# Patient Record
Sex: MALE | URBAN - METROPOLITAN AREA
[De-identification: names, ages, dates, MRNs, and addresses within clinical notes are randomized per-mention and may not be internally consistent; named-entity substitution may affect disease eponyms.]

---

## 2022-01-01 ENCOUNTER — HOSPITAL ENCOUNTER (INPATIENT)
Facility: HOSPITAL | Age: 67
LOS: 8 days | DRG: 064 | End: 2022-11-22
Attending: PSYCHIATRY & NEUROLOGY | Admitting: PSYCHIATRY & NEUROLOGY
Payer: COMMERCIAL

## 2022-01-01 ENCOUNTER — DOCUMENTATION ONLY (OUTPATIENT)
Dept: ELECTROPHYSIOLOGY | Facility: CLINIC | Age: 67
End: 2022-01-01

## 2022-01-01 ENCOUNTER — DOCUMENTATION ONLY (OUTPATIENT)
Dept: ELECTROPHYSIOLOGY | Facility: CLINIC | Age: 67
End: 2022-01-01
Payer: COMMERCIAL

## 2022-01-01 ENCOUNTER — DOCUMENTATION ONLY (OUTPATIENT)
Dept: NEUROLOGY | Facility: CLINIC | Age: 67
End: 2022-01-01
Payer: COMMERCIAL

## 2022-01-01 VITALS
DIASTOLIC BLOOD PRESSURE: 52 MMHG | OXYGEN SATURATION: 98 % | HEART RATE: 116 BPM | WEIGHT: 220.44 LBS | HEIGHT: 72 IN | BODY MASS INDEX: 29.86 KG/M2 | RESPIRATION RATE: 20 BRPM | TEMPERATURE: 99 F | SYSTOLIC BLOOD PRESSURE: 80 MMHG

## 2022-01-01 DIAGNOSIS — I50.9 CONGESTIVE HEART FAILURE, UNSPECIFIED HF CHRONICITY, UNSPECIFIED HEART FAILURE TYPE: Primary | ICD-10-CM

## 2022-01-01 DIAGNOSIS — I48.11 LONGSTANDING PERSISTENT ATRIAL FIBRILLATION: ICD-10-CM

## 2022-01-01 DIAGNOSIS — N17.9 ACUTE KIDNEY INJURY SUPERIMPOSED ON CHRONIC KIDNEY DISEASE: ICD-10-CM

## 2022-01-01 DIAGNOSIS — I61.9 ICH (INTRACEREBRAL HEMORRHAGE): ICD-10-CM

## 2022-01-01 DIAGNOSIS — D68.9: ICD-10-CM

## 2022-01-01 DIAGNOSIS — E87.3 METABOLIC ALKALOSIS: ICD-10-CM

## 2022-01-01 DIAGNOSIS — I50.9 CHF (CONGESTIVE HEART FAILURE): ICD-10-CM

## 2022-01-01 DIAGNOSIS — N18.9 ACUTE KIDNEY INJURY SUPERIMPOSED ON CHRONIC KIDNEY DISEASE: ICD-10-CM

## 2022-01-01 DIAGNOSIS — E87.6 HYPOKALEMIA: ICD-10-CM

## 2022-01-01 DIAGNOSIS — Z99.11 ON MECHANICALLY ASSISTED VENTILATION: ICD-10-CM

## 2022-01-01 DIAGNOSIS — I60.9 SAH (SUBARACHNOID HEMORRHAGE): ICD-10-CM

## 2022-01-01 DIAGNOSIS — R78.81 MSSA BACTEREMIA: ICD-10-CM

## 2022-01-01 DIAGNOSIS — R77.0 LOW SERUM ALBUMIN: ICD-10-CM

## 2022-01-01 DIAGNOSIS — Z71.89 ADVANCE CARE PLANNING: ICD-10-CM

## 2022-01-01 DIAGNOSIS — R57.9 SHOCK: ICD-10-CM

## 2022-01-01 DIAGNOSIS — R00.0 TACHYCARDIA: ICD-10-CM

## 2022-01-01 DIAGNOSIS — Z51.5 PALLIATIVE CARE ENCOUNTER: ICD-10-CM

## 2022-01-01 DIAGNOSIS — I60.9: ICD-10-CM

## 2022-01-01 DIAGNOSIS — R56.9 SEIZURE: ICD-10-CM

## 2022-01-01 DIAGNOSIS — I60.9 SUBARACHNOID BLEED: ICD-10-CM

## 2022-01-01 DIAGNOSIS — B95.61 MSSA BACTEREMIA: ICD-10-CM

## 2022-01-01 DIAGNOSIS — G93.40 ACUTE ENCEPHALOPATHY: ICD-10-CM

## 2022-01-01 DIAGNOSIS — I50.43 ACUTE ON CHRONIC COMBINED SYSTOLIC AND DIASTOLIC CONGESTIVE HEART FAILURE: ICD-10-CM

## 2022-01-01 DIAGNOSIS — E87.0 HYPERNATREMIA: ICD-10-CM

## 2022-01-01 LAB
ABO + RH BLD: NORMAL
ALBUMIN SERPL BCP-MCNC: 1.2 G/DL (ref 3.5–5.2)
ALBUMIN SERPL BCP-MCNC: 1.3 G/DL (ref 3.5–5.2)
ALBUMIN SERPL BCP-MCNC: 1.4 G/DL (ref 3.5–5.2)
ALBUMIN SERPL BCP-MCNC: 1.5 G/DL (ref 3.5–5.2)
ALBUMIN SERPL BCP-MCNC: 1.7 G/DL (ref 3.5–5.2)
ALBUMIN SERPL BCP-MCNC: 2.1 G/DL (ref 3.5–5.2)
ALBUMIN SERPL BCP-MCNC: 2.1 G/DL (ref 3.5–5.2)
ALLENS TEST: ABNORMAL
ALP SERPL-CCNC: 117 U/L (ref 55–135)
ALP SERPL-CCNC: 117 U/L (ref 55–135)
ALP SERPL-CCNC: 132 U/L (ref 55–135)
ALP SERPL-CCNC: 83 U/L (ref 55–135)
ALP SERPL-CCNC: 88 U/L (ref 55–135)
ALP SERPL-CCNC: 94 U/L (ref 55–135)
ALP SERPL-CCNC: 96 U/L (ref 55–135)
ALT SERPL W/O P-5'-P-CCNC: 25 U/L (ref 10–44)
ALT SERPL W/O P-5'-P-CCNC: 26 U/L (ref 10–44)
ALT SERPL W/O P-5'-P-CCNC: 30 U/L (ref 10–44)
ALT SERPL W/O P-5'-P-CCNC: 36 U/L (ref 10–44)
ALT SERPL W/O P-5'-P-CCNC: 66 U/L (ref 10–44)
ALT SERPL W/O P-5'-P-CCNC: 68 U/L (ref 10–44)
ALT SERPL W/O P-5'-P-CCNC: 69 U/L (ref 10–44)
ANION GAP SERPL CALC-SCNC: 10 MMOL/L (ref 8–16)
ANION GAP SERPL CALC-SCNC: 11 MMOL/L (ref 8–16)
ANION GAP SERPL CALC-SCNC: 12 MMOL/L (ref 8–16)
ANION GAP SERPL CALC-SCNC: 13 MMOL/L (ref 8–16)
ANION GAP SERPL CALC-SCNC: 14 MMOL/L (ref 8–16)
ANION GAP SERPL CALC-SCNC: 15 MMOL/L (ref 8–16)
ANION GAP SERPL CALC-SCNC: 16 MMOL/L (ref 8–16)
ANION GAP SERPL CALC-SCNC: 6 MMOL/L (ref 8–16)
ANISOCYTOSIS BLD QL SMEAR: SLIGHT
APTT BLDCRRT: 26.1 SEC (ref 21–32)
APTT BLDCRRT: 26.9 SEC (ref 21–32)
APTT BLDCRRT: 28.7 SEC (ref 21–32)
APTT BLDCRRT: 31 SEC (ref 21–32)
APTT BLDCRRT: 31.2 SEC (ref 21–32)
APTT BLDCRRT: 31.4 SEC (ref 21–32)
ASCENDING AORTA: 3.27 CM
AST SERPL-CCNC: 113 U/L (ref 10–40)
AST SERPL-CCNC: 130 U/L (ref 10–40)
AST SERPL-CCNC: 147 U/L (ref 10–40)
AST SERPL-CCNC: 68 U/L (ref 10–40)
AST SERPL-CCNC: 72 U/L (ref 10–40)
AST SERPL-CCNC: 74 U/L (ref 10–40)
AST SERPL-CCNC: 82 U/L (ref 10–40)
AV INDEX (PROSTH): 0.49
AV MEAN GRADIENT: 4 MMHG
AV PEAK GRADIENT: 5 MMHG
AV VALVE AREA: 1.73 CM2
AV VELOCITY RATIO: 0.49
BACTERIA #/AREA URNS AUTO: ABNORMAL /HPF
BACTERIA BLD CULT: NORMAL
BACTERIA BLD CULT: NORMAL
BACTERIA SPEC AEROBE CULT: ABNORMAL
BACTERIA SPEC AEROBE CULT: ABNORMAL
BASOPHILS # BLD AUTO: 0.03 K/UL (ref 0–0.2)
BASOPHILS # BLD AUTO: 0.04 K/UL (ref 0–0.2)
BASOPHILS # BLD AUTO: 0.05 K/UL (ref 0–0.2)
BASOPHILS # BLD AUTO: 0.05 K/UL (ref 0–0.2)
BASOPHILS # BLD AUTO: ABNORMAL K/UL (ref 0–0.2)
BASOPHILS NFR BLD: 0 % (ref 0–1.9)
BASOPHILS NFR BLD: 0.2 % (ref 0–1.9)
BASOPHILS NFR BLD: 0.3 % (ref 0–1.9)
BASOPHILS NFR BLD: 0.3 % (ref 0–1.9)
BASOPHILS NFR BLD: 0.5 % (ref 0–1.9)
BILIRUB SERPL-MCNC: 1.2 MG/DL (ref 0.1–1)
BILIRUB SERPL-MCNC: 1.5 MG/DL (ref 0.1–1)
BILIRUB SERPL-MCNC: 1.7 MG/DL (ref 0.1–1)
BILIRUB SERPL-MCNC: 1.9 MG/DL (ref 0.1–1)
BILIRUB SERPL-MCNC: 2.4 MG/DL (ref 0.1–1)
BILIRUB SERPL-MCNC: 2.6 MG/DL (ref 0.1–1)
BILIRUB SERPL-MCNC: 3.3 MG/DL (ref 0.1–1)
BILIRUB UR QL STRIP: NEGATIVE
BLD GP AB SCN CELLS X3 SERPL QL: NORMAL
BNP SERPL-MCNC: 337 PG/ML (ref 0–99)
BSA FOR ECHO PROCEDURE: 2.25 M2
BUN SERPL-MCNC: 103 MG/DL (ref 8–23)
BUN SERPL-MCNC: 104 MG/DL (ref 8–23)
BUN SERPL-MCNC: 105 MG/DL (ref 8–23)
BUN SERPL-MCNC: 108 MG/DL (ref 8–23)
BUN SERPL-MCNC: 109 MG/DL (ref 8–23)
BUN SERPL-MCNC: 117 MG/DL (ref 8–23)
BUN SERPL-MCNC: 118 MG/DL (ref 8–23)
BUN SERPL-MCNC: 67 MG/DL (ref 8–23)
BUN SERPL-MCNC: 68 MG/DL (ref 8–23)
BUN SERPL-MCNC: 72 MG/DL (ref 8–23)
BUN SERPL-MCNC: 75 MG/DL (ref 8–23)
BUN SERPL-MCNC: 76 MG/DL (ref 8–23)
BUN SERPL-MCNC: 81 MG/DL (ref 8–23)
BUN SERPL-MCNC: 83 MG/DL (ref 8–23)
BUN SERPL-MCNC: 85 MG/DL (ref 8–23)
BUN SERPL-MCNC: 85 MG/DL (ref 8–23)
BUN SERPL-MCNC: 87 MG/DL (ref 8–23)
BUN SERPL-MCNC: 88 MG/DL (ref 8–23)
BUN SERPL-MCNC: 90 MG/DL (ref 8–23)
BUN SERPL-MCNC: 93 MG/DL (ref 8–23)
BUN SERPL-MCNC: 96 MG/DL (ref 8–23)
BUN SERPL-MCNC: 99 MG/DL (ref 8–23)
BUN SERPL-MCNC: 99 MG/DL (ref 8–23)
BURR CELLS BLD QL SMEAR: ABNORMAL
CALCIUM SERPL-MCNC: 8 MG/DL (ref 8.7–10.5)
CALCIUM SERPL-MCNC: 8.1 MG/DL (ref 8.7–10.5)
CALCIUM SERPL-MCNC: 8.2 MG/DL (ref 8.7–10.5)
CALCIUM SERPL-MCNC: 8.2 MG/DL (ref 8.7–10.5)
CALCIUM SERPL-MCNC: 8.3 MG/DL (ref 8.7–10.5)
CALCIUM SERPL-MCNC: 8.4 MG/DL (ref 8.7–10.5)
CALCIUM SERPL-MCNC: 8.5 MG/DL (ref 8.7–10.5)
CALCIUM SERPL-MCNC: 8.6 MG/DL (ref 8.7–10.5)
CALCIUM SERPL-MCNC: 8.7 MG/DL (ref 8.7–10.5)
CALCIUM SERPL-MCNC: 8.7 MG/DL (ref 8.7–10.5)
CALCIUM SERPL-MCNC: 8.8 MG/DL (ref 8.7–10.5)
CALCIUM SERPL-MCNC: 8.8 MG/DL (ref 8.7–10.5)
CALCIUM SERPL-MCNC: 8.9 MG/DL (ref 8.7–10.5)
CALCIUM SERPL-MCNC: 9 MG/DL (ref 8.7–10.5)
CALCIUM SERPL-MCNC: 9 MG/DL (ref 8.7–10.5)
CALCIUM SERPL-MCNC: 9.1 MG/DL (ref 8.7–10.5)
CALCIUM SERPL-MCNC: 9.1 MG/DL (ref 8.7–10.5)
CHLORIDE SERPL-SCNC: 100 MMOL/L (ref 95–110)
CHLORIDE SERPL-SCNC: 101 MMOL/L (ref 95–110)
CHLORIDE SERPL-SCNC: 104 MMOL/L (ref 95–110)
CHLORIDE SERPL-SCNC: 105 MMOL/L (ref 95–110)
CHLORIDE SERPL-SCNC: 106 MMOL/L (ref 95–110)
CHLORIDE SERPL-SCNC: 107 MMOL/L (ref 95–110)
CHLORIDE SERPL-SCNC: 108 MMOL/L (ref 95–110)
CHLORIDE SERPL-SCNC: 109 MMOL/L (ref 95–110)
CHLORIDE SERPL-SCNC: 112 MMOL/L (ref 95–110)
CHLORIDE SERPL-SCNC: 112 MMOL/L (ref 95–110)
CHLORIDE SERPL-SCNC: 113 MMOL/L (ref 95–110)
CHLORIDE SERPL-SCNC: 114 MMOL/L (ref 95–110)
CHLORIDE SERPL-SCNC: 114 MMOL/L (ref 95–110)
CHLORIDE SERPL-SCNC: 90 MMOL/L (ref 95–110)
CHLORIDE SERPL-SCNC: 98 MMOL/L (ref 95–110)
CHLORIDE SERPL-SCNC: 98 MMOL/L (ref 95–110)
CHOLEST SERPL-MCNC: 91 MG/DL (ref 120–199)
CHOLEST/HDLC SERPL: 6.5 {RATIO} (ref 2–5)
CK MB SERPL-MCNC: 1.5 NG/ML (ref 0.1–6.5)
CK MB SERPL-RTO: 0.2 % (ref 0–5)
CK SERPL-CCNC: 718 U/L (ref 20–200)
CLARITY UR REFRACT.AUTO: CLEAR
CO2 SERPL-SCNC: 30 MMOL/L (ref 23–29)
CO2 SERPL-SCNC: 31 MMOL/L (ref 23–29)
CO2 SERPL-SCNC: 32 MMOL/L (ref 23–29)
CO2 SERPL-SCNC: 33 MMOL/L (ref 23–29)
CO2 SERPL-SCNC: 34 MMOL/L (ref 23–29)
CO2 SERPL-SCNC: 34 MMOL/L (ref 23–29)
CO2 SERPL-SCNC: 35 MMOL/L (ref 23–29)
CO2 SERPL-SCNC: 36 MMOL/L (ref 23–29)
CO2 SERPL-SCNC: 38 MMOL/L (ref 23–29)
CO2 SERPL-SCNC: 38 MMOL/L (ref 23–29)
CO2 SERPL-SCNC: 42 MMOL/L (ref 23–29)
COLOR UR AUTO: YELLOW
CREAT SERPL-MCNC: 1.4 MG/DL (ref 0.5–1.4)
CREAT SERPL-MCNC: 1.5 MG/DL (ref 0.5–1.4)
CREAT SERPL-MCNC: 1.6 MG/DL (ref 0.5–1.4)
CREAT SERPL-MCNC: 1.7 MG/DL (ref 0.5–1.4)
CREAT SERPL-MCNC: 1.7 MG/DL (ref 0.5–1.4)
CREAT SERPL-MCNC: 1.8 MG/DL (ref 0.5–1.4)
CREAT SERPL-MCNC: 1.9 MG/DL (ref 0.5–1.4)
CREAT SERPL-MCNC: 2 MG/DL (ref 0.5–1.4)
CREAT SERPL-MCNC: 2 MG/DL (ref 0.5–1.4)
CV ECHO LV RWT: 0.32 CM
DACRYOCYTES BLD QL SMEAR: ABNORMAL
DELSYS: ABNORMAL
DIFFERENTIAL METHOD: ABNORMAL
DOHLE BOD BLD QL SMEAR: PRESENT
DOHLE BOD BLD QL SMEAR: PRESENT
DOP CALC AO PEAK VEL: 1.09 M/S
DOP CALC AO VTI: 15.71 CM
DOP CALC LVOT AREA: 3.6 CM2
DOP CALC LVOT DIAMETER: 2.13 CM
DOP CALC LVOT PEAK VEL: 0.53 M/S
DOP CALC LVOT STROKE VOLUME: 27.21 CM3
DOP CALCLVOT PEAK VEL VTI: 7.64 CM
ECHO LV POSTERIOR WALL: 0.9 CM (ref 0.6–1.1)
EJECTION FRACTION: 10 %
EOSINOPHIL # BLD AUTO: 0 K/UL (ref 0–0.5)
EOSINOPHIL # BLD AUTO: 0 K/UL (ref 0–0.5)
EOSINOPHIL # BLD AUTO: 0.1 K/UL (ref 0–0.5)
EOSINOPHIL # BLD AUTO: ABNORMAL K/UL (ref 0–0.5)
EOSINOPHIL NFR BLD: 0 % (ref 0–8)
EOSINOPHIL NFR BLD: 0.1 % (ref 0–8)
EOSINOPHIL NFR BLD: 0.3 % (ref 0–8)
EOSINOPHIL NFR BLD: 0.3 % (ref 0–8)
EOSINOPHIL NFR BLD: 0.4 % (ref 0–8)
EOSINOPHIL NFR BLD: 0.5 % (ref 0–8)
EOSINOPHIL NFR BLD: 0.9 % (ref 0–8)
ERYTHROCYTE [DISTWIDTH] IN BLOOD BY AUTOMATED COUNT: 19.1 % (ref 11.5–14.5)
ERYTHROCYTE [DISTWIDTH] IN BLOOD BY AUTOMATED COUNT: 19.1 % (ref 11.5–14.5)
ERYTHROCYTE [DISTWIDTH] IN BLOOD BY AUTOMATED COUNT: 19.4 % (ref 11.5–14.5)
ERYTHROCYTE [DISTWIDTH] IN BLOOD BY AUTOMATED COUNT: 19.5 % (ref 11.5–14.5)
ERYTHROCYTE [DISTWIDTH] IN BLOOD BY AUTOMATED COUNT: 19.5 % (ref 11.5–14.5)
ERYTHROCYTE [DISTWIDTH] IN BLOOD BY AUTOMATED COUNT: 19.8 % (ref 11.5–14.5)
ERYTHROCYTE [DISTWIDTH] IN BLOOD BY AUTOMATED COUNT: 19.8 % (ref 11.5–14.5)
ERYTHROCYTE [SEDIMENTATION RATE] IN BLOOD BY WESTERGREN METHOD: 16 MM/H
ERYTHROCYTE [SEDIMENTATION RATE] IN BLOOD BY WESTERGREN METHOD: 16 MM/H
ERYTHROCYTE [SEDIMENTATION RATE] IN BLOOD BY WESTERGREN METHOD: 18 MM/H
ERYTHROCYTE [SEDIMENTATION RATE] IN BLOOD BY WESTERGREN METHOD: 20 MM/H
EST. GFR  (NO RACE VARIABLE): 36.4 ML/MIN/1.73 M^2
EST. GFR  (NO RACE VARIABLE): 36.4 ML/MIN/1.73 M^2
EST. GFR  (NO RACE VARIABLE): 38.7 ML/MIN/1.73 M^2
EST. GFR  (NO RACE VARIABLE): 41.3 ML/MIN/1.73 M^2
EST. GFR  (NO RACE VARIABLE): 44.2 ML/MIN/1.73 M^2
EST. GFR  (NO RACE VARIABLE): 44.2 ML/MIN/1.73 M^2
EST. GFR  (NO RACE VARIABLE): 47.5 ML/MIN/1.73 M^2
EST. GFR  (NO RACE VARIABLE): 51.3 ML/MIN/1.73 M^2
EST. GFR  (NO RACE VARIABLE): 55.8 ML/MIN/1.73 M^2
ESTIMATED AVG GLUCOSE: 148 MG/DL (ref 68–131)
FIO2: 40
FIO2: 50
FIO2: 60
FRACTIONAL SHORTENING: 3 % (ref 28–44)
GIANT PLATELETS BLD QL SMEAR: PRESENT
GLUCOSE SERPL-MCNC: 157 MG/DL (ref 70–110)
GLUCOSE SERPL-MCNC: 175 MG/DL (ref 70–110)
GLUCOSE SERPL-MCNC: 180 MG/DL (ref 70–110)
GLUCOSE SERPL-MCNC: 187 MG/DL (ref 70–110)
GLUCOSE SERPL-MCNC: 188 MG/DL (ref 70–110)
GLUCOSE SERPL-MCNC: 207 MG/DL (ref 70–110)
GLUCOSE SERPL-MCNC: 216 MG/DL (ref 70–110)
GLUCOSE SERPL-MCNC: 220 MG/DL (ref 70–110)
GLUCOSE SERPL-MCNC: 228 MG/DL (ref 70–110)
GLUCOSE SERPL-MCNC: 232 MG/DL (ref 70–110)
GLUCOSE SERPL-MCNC: 232 MG/DL (ref 70–110)
GLUCOSE SERPL-MCNC: 233 MG/DL (ref 70–110)
GLUCOSE SERPL-MCNC: 240 MG/DL (ref 70–110)
GLUCOSE SERPL-MCNC: 241 MG/DL (ref 70–110)
GLUCOSE SERPL-MCNC: 247 MG/DL (ref 70–110)
GLUCOSE SERPL-MCNC: 259 MG/DL (ref 70–110)
GLUCOSE SERPL-MCNC: 259 MG/DL (ref 70–110)
GLUCOSE SERPL-MCNC: 265 MG/DL (ref 70–110)
GLUCOSE SERPL-MCNC: 281 MG/DL (ref 70–110)
GLUCOSE SERPL-MCNC: 293 MG/DL (ref 70–110)
GLUCOSE SERPL-MCNC: 328 MG/DL (ref 70–110)
GLUCOSE SERPL-MCNC: 334 MG/DL (ref 70–110)
GLUCOSE SERPL-MCNC: 344 MG/DL (ref 70–110)
GLUCOSE UR QL STRIP: ABNORMAL
GRAM STN SPEC: ABNORMAL
HBA1C MFR BLD: 6.8 % (ref 4–5.6)
HCO3 UR-SCNC: 32.3 MMOL/L (ref 24–28)
HCO3 UR-SCNC: 34.4 MMOL/L (ref 24–28)
HCO3 UR-SCNC: 35.4 MMOL/L (ref 24–28)
HCO3 UR-SCNC: 36.3 MMOL/L (ref 24–28)
HCO3 UR-SCNC: 36.9 MMOL/L (ref 24–28)
HCO3 UR-SCNC: 37.4 MMOL/L (ref 24–28)
HCO3 UR-SCNC: 41.8 MMOL/L (ref 24–28)
HCO3 UR-SCNC: 46.4 MMOL/L (ref 24–28)
HCO3 UR-SCNC: 46.5 MMOL/L (ref 24–28)
HCT VFR BLD AUTO: 42.1 % (ref 40–54)
HCT VFR BLD AUTO: 42.4 % (ref 40–54)
HCT VFR BLD AUTO: 44.6 % (ref 40–54)
HCT VFR BLD AUTO: 44.9 % (ref 40–54)
HCT VFR BLD AUTO: 45.5 % (ref 40–54)
HCT VFR BLD AUTO: 46.5 % (ref 40–54)
HCT VFR BLD AUTO: 46.5 % (ref 40–54)
HDLC SERPL-MCNC: 14 MG/DL (ref 40–75)
HDLC SERPL: 15.4 % (ref 20–50)
HGB BLD-MCNC: 12.4 G/DL (ref 14–18)
HGB BLD-MCNC: 12.5 G/DL (ref 14–18)
HGB BLD-MCNC: 12.9 G/DL (ref 14–18)
HGB BLD-MCNC: 13.2 G/DL (ref 14–18)
HGB BLD-MCNC: 13.3 G/DL (ref 14–18)
HGB BLD-MCNC: 13.7 G/DL (ref 14–18)
HGB BLD-MCNC: 13.9 G/DL (ref 14–18)
HGB UR QL STRIP: ABNORMAL
HYALINE CASTS UR QL AUTO: 1 /LPF
HYPOCHROMIA BLD QL SMEAR: ABNORMAL
HYPOCHROMIA BLD QL SMEAR: ABNORMAL
IMM GRANULOCYTES # BLD AUTO: 0.05 K/UL (ref 0–0.04)
IMM GRANULOCYTES # BLD AUTO: 0.05 K/UL (ref 0–0.04)
IMM GRANULOCYTES # BLD AUTO: 0.16 K/UL (ref 0–0.04)
IMM GRANULOCYTES # BLD AUTO: 0.18 K/UL (ref 0–0.04)
IMM GRANULOCYTES # BLD AUTO: 0.19 K/UL (ref 0–0.04)
IMM GRANULOCYTES # BLD AUTO: 0.96 K/UL (ref 0–0.04)
IMM GRANULOCYTES # BLD AUTO: ABNORMAL K/UL (ref 0–0.04)
IMM GRANULOCYTES NFR BLD AUTO: 0.4 % (ref 0–0.5)
IMM GRANULOCYTES NFR BLD AUTO: 0.5 % (ref 0–0.5)
IMM GRANULOCYTES NFR BLD AUTO: 1.2 % (ref 0–0.5)
IMM GRANULOCYTES NFR BLD AUTO: 1.2 % (ref 0–0.5)
IMM GRANULOCYTES NFR BLD AUTO: 1.6 % (ref 0–0.5)
IMM GRANULOCYTES NFR BLD AUTO: 4.7 % (ref 0–0.5)
IMM GRANULOCYTES NFR BLD AUTO: ABNORMAL % (ref 0–0.5)
INR PPP: 1.2 (ref 0.8–1.2)
INR PPP: 1.3 (ref 0.8–1.2)
INR PPP: 1.4 (ref 0.8–1.2)
INR PPP: 1.6 (ref 0.8–1.2)
INTERVENTRICULAR SEPTUM: 1 CM (ref 0.6–1.1)
KETONES UR QL STRIP: NEGATIVE
LA MAJOR: 7.01 CM
LA MINOR: 6.52 CM
LA WIDTH: 4.35 CM
LACTATE SERPL-SCNC: 1.8 MMOL/L (ref 0.5–2.2)
LACTATE SERPL-SCNC: 2.5 MMOL/L (ref 0.5–2.2)
LDLC SERPL CALC-MCNC: 36.8 MG/DL (ref 63–159)
LEFT ATRIUM SIZE: 5.02 CM
LEFT ATRIUM VOLUME INDEX: 56.5 ML/M2
LEFT ATRIUM VOLUME: 125.4 CM3
LEFT INTERNAL DIMENSION IN SYSTOLE: 5.55 CM (ref 2.1–4)
LEFT VENTRICLE DIASTOLIC VOLUME INDEX: 83.15 ML/M2
LEFT VENTRICLE DIASTOLIC VOLUME: 184.59 ML
LEFT VENTRICLE MASS INDEX: 95 G/M2
LEFT VENTRICLE SYSTOLIC VOLUME INDEX: 67.8 ML/M2
LEFT VENTRICLE SYSTOLIC VOLUME: 150.55 ML
LEFT VENTRICULAR INTERNAL DIMENSION IN DIASTOLE: 5.7 CM (ref 3.5–6)
LEFT VENTRICULAR MASS: 211.75 G
LEUKOCYTE ESTERASE UR QL STRIP: ABNORMAL
LYMPHOCYTES # BLD AUTO: 0.4 K/UL (ref 1–4.8)
LYMPHOCYTES # BLD AUTO: 0.4 K/UL (ref 1–4.8)
LYMPHOCYTES # BLD AUTO: 0.5 K/UL (ref 1–4.8)
LYMPHOCYTES # BLD AUTO: 0.6 K/UL (ref 1–4.8)
LYMPHOCYTES # BLD AUTO: ABNORMAL K/UL (ref 1–4.8)
LYMPHOCYTES NFR BLD: 2 % (ref 18–48)
LYMPHOCYTES NFR BLD: 3.1 % (ref 18–48)
LYMPHOCYTES NFR BLD: 3.6 % (ref 18–48)
LYMPHOCYTES NFR BLD: 4 % (ref 18–48)
LYMPHOCYTES NFR BLD: 4.5 % (ref 18–48)
LYMPHOCYTES NFR BLD: 4.6 % (ref 18–48)
LYMPHOCYTES NFR BLD: 4.7 % (ref 18–48)
MAGNESIUM SERPL-MCNC: 2.1 MG/DL (ref 1.6–2.6)
MAGNESIUM SERPL-MCNC: 2.2 MG/DL (ref 1.6–2.6)
MAGNESIUM SERPL-MCNC: 2.2 MG/DL (ref 1.6–2.6)
MAGNESIUM SERPL-MCNC: 2.3 MG/DL (ref 1.6–2.6)
MAGNESIUM SERPL-MCNC: 2.4 MG/DL (ref 1.6–2.6)
MAGNESIUM SERPL-MCNC: 2.4 MG/DL (ref 1.6–2.6)
MAGNESIUM SERPL-MCNC: 2.5 MG/DL (ref 1.6–2.6)
MAGNESIUM SERPL-MCNC: 2.5 MG/DL (ref 1.6–2.6)
MAGNESIUM SERPL-MCNC: 2.6 MG/DL (ref 1.6–2.6)
MCH RBC QN AUTO: 30 PG (ref 27–31)
MCH RBC QN AUTO: 30.4 PG (ref 27–31)
MCH RBC QN AUTO: 30.6 PG (ref 27–31)
MCH RBC QN AUTO: 30.6 PG (ref 27–31)
MCH RBC QN AUTO: 30.8 PG (ref 27–31)
MCH RBC QN AUTO: 30.9 PG (ref 27–31)
MCH RBC QN AUTO: 31.4 PG (ref 27–31)
MCHC RBC AUTO-ENTMCNC: 28.6 G/DL (ref 32–36)
MCHC RBC AUTO-ENTMCNC: 28.7 G/DL (ref 32–36)
MCHC RBC AUTO-ENTMCNC: 29.5 G/DL (ref 32–36)
MCHC RBC AUTO-ENTMCNC: 29.5 G/DL (ref 32–36)
MCHC RBC AUTO-ENTMCNC: 29.6 G/DL (ref 32–36)
MCHC RBC AUTO-ENTMCNC: 29.9 G/DL (ref 32–36)
MCHC RBC AUTO-ENTMCNC: 30.1 G/DL (ref 32–36)
MCV RBC AUTO: 100 FL (ref 82–98)
MCV RBC AUTO: 104 FL (ref 82–98)
MCV RBC AUTO: 105 FL (ref 82–98)
MCV RBC AUTO: 106 FL (ref 82–98)
MCV RBC AUTO: 107 FL (ref 82–98)
MICROSCOPIC COMMENT: ABNORMAL
MODE: ABNORMAL
MONOCYTES # BLD AUTO: 0.4 K/UL (ref 0.3–1)
MONOCYTES # BLD AUTO: 0.5 K/UL (ref 0.3–1)
MONOCYTES # BLD AUTO: 0.5 K/UL (ref 0.3–1)
MONOCYTES # BLD AUTO: 0.6 K/UL (ref 0.3–1)
MONOCYTES # BLD AUTO: 0.7 K/UL (ref 0.3–1)
MONOCYTES # BLD AUTO: 0.9 K/UL (ref 0.3–1)
MONOCYTES # BLD AUTO: ABNORMAL K/UL (ref 0.3–1)
MONOCYTES NFR BLD: 3.4 % (ref 4–15)
MONOCYTES NFR BLD: 3.6 % (ref 4–15)
MONOCYTES NFR BLD: 3.9 % (ref 4–15)
MONOCYTES NFR BLD: 4 % (ref 4–15)
MONOCYTES NFR BLD: 4.5 % (ref 4–15)
MONOCYTES NFR BLD: 4.6 % (ref 4–15)
MONOCYTES NFR BLD: 7.4 % (ref 4–15)
NEUTROPHILS # BLD AUTO: 10.3 K/UL (ref 1.8–7.7)
NEUTROPHILS # BLD AUTO: 12.1 K/UL (ref 1.8–7.7)
NEUTROPHILS # BLD AUTO: 12.5 K/UL (ref 1.8–7.7)
NEUTROPHILS # BLD AUTO: 14.4 K/UL (ref 1.8–7.7)
NEUTROPHILS # BLD AUTO: 18.1 K/UL (ref 1.8–7.7)
NEUTROPHILS # BLD AUTO: 8.1 K/UL (ref 1.8–7.7)
NEUTROPHILS NFR BLD: 86.5 % (ref 38–73)
NEUTROPHILS NFR BLD: 88.5 % (ref 38–73)
NEUTROPHILS NFR BLD: 88.9 % (ref 38–73)
NEUTROPHILS NFR BLD: 89 % (ref 38–73)
NEUTROPHILS NFR BLD: 90 % (ref 38–73)
NEUTROPHILS NFR BLD: 91.6 % (ref 38–73)
NEUTROPHILS NFR BLD: 91.8 % (ref 38–73)
NEUTS BAND NFR BLD MANUAL: 2 %
NITRITE UR QL STRIP: NEGATIVE
NONHDLC SERPL-MCNC: 77 MG/DL
NRBC BLD-RTO: 0 /100 WBC
NRBC BLD-RTO: 1 /100 WBC
NRBC BLD-RTO: 1 /100 WBC
OVALOCYTES BLD QL SMEAR: ABNORMAL
OVALOCYTES BLD QL SMEAR: ABNORMAL
PCO2 BLDA: 33.9 MMHG (ref 35–45)
PCO2 BLDA: 46.1 MMHG (ref 35–45)
PCO2 BLDA: 48.4 MMHG (ref 35–45)
PCO2 BLDA: 49.8 MMHG (ref 35–45)
PCO2 BLDA: 52.6 MMHG (ref 35–45)
PCO2 BLDA: 52.6 MMHG (ref 35–45)
PCO2 BLDA: 53.1 MMHG (ref 35–45)
PCO2 BLDA: 56.3 MMHG (ref 35–45)
PCO2 BLDA: 56.3 MMHG (ref 35–45)
PEEP: 5
PEEP: 5
PEEP: 8
PH SMN: 7.41 [PH] (ref 7.35–7.45)
PH SMN: 7.43 [PH] (ref 7.35–7.45)
PH SMN: 7.45 [PH] (ref 7.35–7.45)
PH SMN: 7.48 [PH] (ref 7.35–7.45)
PH SMN: 7.48 [PH] (ref 7.35–7.45)
PH SMN: 7.5 [PH] (ref 7.35–7.45)
PH SMN: 7.55 [PH] (ref 7.35–7.45)
PH SMN: 7.59 [PH] (ref 7.35–7.45)
PH SMN: 7.59 [PH] (ref 7.35–7.45)
PH UR STRIP: 8 [PH] (ref 5–8)
PHOSPHATE SERPL-MCNC: 1.6 MG/DL (ref 2.7–4.5)
PHOSPHATE SERPL-MCNC: 1.6 MG/DL (ref 2.7–4.5)
PHOSPHATE SERPL-MCNC: 1.7 MG/DL (ref 2.7–4.5)
PHOSPHATE SERPL-MCNC: 1.8 MG/DL (ref 2.7–4.5)
PHOSPHATE SERPL-MCNC: 1.8 MG/DL (ref 2.7–4.5)
PHOSPHATE SERPL-MCNC: 2.2 MG/DL (ref 2.7–4.5)
PHOSPHATE SERPL-MCNC: 2.4 MG/DL (ref 2.7–4.5)
PHOSPHATE SERPL-MCNC: 2.6 MG/DL (ref 2.7–4.5)
PHOSPHATE SERPL-MCNC: 2.7 MG/DL (ref 2.7–4.5)
PHOSPHATE SERPL-MCNC: 2.9 MG/DL (ref 2.7–4.5)
PHOSPHATE SERPL-MCNC: 3.6 MG/DL (ref 2.7–4.5)
PIP: 25
PIP: 26
PIP: 30
PISA TR MAX VEL: 2.45 M/S
PLATELET # BLD AUTO: 152 K/UL (ref 150–450)
PLATELET # BLD AUTO: 161 K/UL (ref 150–450)
PLATELET # BLD AUTO: 162 K/UL (ref 150–450)
PLATELET # BLD AUTO: 169 K/UL (ref 150–450)
PLATELET # BLD AUTO: 193 K/UL (ref 150–450)
PLATELET # BLD AUTO: 195 K/UL (ref 150–450)
PLATELET # BLD AUTO: 203 K/UL (ref 150–450)
PLATELET BLD QL SMEAR: ABNORMAL
PMV BLD AUTO: 11.8 FL (ref 9.2–12.9)
PMV BLD AUTO: 11.9 FL (ref 9.2–12.9)
PMV BLD AUTO: 12.2 FL (ref 9.2–12.9)
PMV BLD AUTO: 12.6 FL (ref 9.2–12.9)
PMV BLD AUTO: 12.9 FL (ref 9.2–12.9)
PMV BLD AUTO: 13.3 FL (ref 9.2–12.9)
PMV BLD AUTO: 13.4 FL (ref 9.2–12.9)
PO2 BLDA: 110 MMHG (ref 80–100)
PO2 BLDA: 111 MMHG (ref 80–100)
PO2 BLDA: 119 MMHG (ref 80–100)
PO2 BLDA: 132 MMHG (ref 80–100)
PO2 BLDA: 152 MMHG (ref 80–100)
PO2 BLDA: 31 MMHG (ref 40–60)
PO2 BLDA: 32 MMHG (ref 40–60)
PO2 BLDA: 72 MMHG (ref 80–100)
PO2 BLDA: 93 MMHG (ref 80–100)
POC BE: 11 MMOL/L
POC BE: 12 MMOL/L
POC BE: 13 MMOL/L
POC BE: 13 MMOL/L
POC BE: 19 MMOL/L
POC BE: 24 MMOL/L
POC BE: 25 MMOL/L
POC SATURATED O2: 100 % (ref 95–100)
POC SATURATED O2: 59 % (ref 95–100)
POC SATURATED O2: 63 % (ref 95–100)
POC SATURATED O2: 96 % (ref 95–100)
POC SATURATED O2: 98 % (ref 95–100)
POC SATURATED O2: 99 % (ref 95–100)
POC TCO2: 33 MMOL/L (ref 23–27)
POC TCO2: 36 MMOL/L (ref 23–27)
POC TCO2: 37 MMOL/L (ref 23–27)
POC TCO2: 38 MMOL/L (ref 23–27)
POC TCO2: 38 MMOL/L (ref 24–29)
POC TCO2: 39 MMOL/L (ref 24–29)
POC TCO2: 43 MMOL/L (ref 23–27)
POC TCO2: 48 MMOL/L (ref 23–27)
POC TCO2: 48 MMOL/L (ref 23–27)
POCT GLUCOSE: 134 MG/DL (ref 70–110)
POCT GLUCOSE: 143 MG/DL (ref 70–110)
POCT GLUCOSE: 149 MG/DL (ref 70–110)
POCT GLUCOSE: 156 MG/DL (ref 70–110)
POCT GLUCOSE: 161 MG/DL (ref 70–110)
POCT GLUCOSE: 173 MG/DL (ref 70–110)
POCT GLUCOSE: 175 MG/DL (ref 70–110)
POCT GLUCOSE: 178 MG/DL (ref 70–110)
POCT GLUCOSE: 182 MG/DL (ref 70–110)
POCT GLUCOSE: 186 MG/DL (ref 70–110)
POCT GLUCOSE: 187 MG/DL (ref 70–110)
POCT GLUCOSE: 188 MG/DL (ref 70–110)
POCT GLUCOSE: 188 MG/DL (ref 70–110)
POCT GLUCOSE: 195 MG/DL (ref 70–110)
POCT GLUCOSE: 205 MG/DL (ref 70–110)
POCT GLUCOSE: 206 MG/DL (ref 70–110)
POCT GLUCOSE: 207 MG/DL (ref 70–110)
POCT GLUCOSE: 208 MG/DL (ref 70–110)
POCT GLUCOSE: 209 MG/DL (ref 70–110)
POCT GLUCOSE: 210 MG/DL (ref 70–110)
POCT GLUCOSE: 215 MG/DL (ref 70–110)
POCT GLUCOSE: 222 MG/DL (ref 70–110)
POCT GLUCOSE: 237 MG/DL (ref 70–110)
POCT GLUCOSE: 237 MG/DL (ref 70–110)
POCT GLUCOSE: 242 MG/DL (ref 70–110)
POCT GLUCOSE: 245 MG/DL (ref 70–110)
POCT GLUCOSE: 248 MG/DL (ref 70–110)
POCT GLUCOSE: 254 MG/DL (ref 70–110)
POCT GLUCOSE: 255 MG/DL (ref 70–110)
POCT GLUCOSE: 270 MG/DL (ref 70–110)
POCT GLUCOSE: 273 MG/DL (ref 70–110)
POCT GLUCOSE: 273 MG/DL (ref 70–110)
POCT GLUCOSE: 277 MG/DL (ref 70–110)
POCT GLUCOSE: 280 MG/DL (ref 70–110)
POCT GLUCOSE: 281 MG/DL (ref 70–110)
POCT GLUCOSE: 281 MG/DL (ref 70–110)
POCT GLUCOSE: 283 MG/DL (ref 70–110)
POCT GLUCOSE: 305 MG/DL (ref 70–110)
POCT GLUCOSE: 316 MG/DL (ref 70–110)
POCT GLUCOSE: 316 MG/DL (ref 70–110)
POIKILOCYTOSIS BLD QL SMEAR: ABNORMAL
POIKILOCYTOSIS BLD QL SMEAR: ABNORMAL
POIKILOCYTOSIS BLD QL SMEAR: SLIGHT
POIKILOCYTOSIS BLD QL SMEAR: SLIGHT
POLYCHROMASIA BLD QL SMEAR: ABNORMAL
POLYCHROMASIA BLD QL SMEAR: ABNORMAL
POTASSIUM SERPL-SCNC: 2.4 MMOL/L (ref 3.5–5.1)
POTASSIUM SERPL-SCNC: 2.6 MMOL/L (ref 3.5–5.1)
POTASSIUM SERPL-SCNC: 2.7 MMOL/L (ref 3.5–5.1)
POTASSIUM SERPL-SCNC: 3 MMOL/L (ref 3.5–5.1)
POTASSIUM SERPL-SCNC: 3 MMOL/L (ref 3.5–5.1)
POTASSIUM SERPL-SCNC: 3.1 MMOL/L (ref 3.5–5.1)
POTASSIUM SERPL-SCNC: 3.2 MMOL/L (ref 3.5–5.1)
POTASSIUM SERPL-SCNC: 3.4 MMOL/L (ref 3.5–5.1)
POTASSIUM SERPL-SCNC: 3.5 MMOL/L (ref 3.5–5.1)
POTASSIUM SERPL-SCNC: 3.6 MMOL/L (ref 3.5–5.1)
POTASSIUM SERPL-SCNC: 3.7 MMOL/L (ref 3.5–5.1)
POTASSIUM SERPL-SCNC: 3.8 MMOL/L (ref 3.5–5.1)
POTASSIUM SERPL-SCNC: 3.9 MMOL/L (ref 3.5–5.1)
POTASSIUM SERPL-SCNC: 4.6 MMOL/L (ref 3.5–5.1)
PROCALCITONIN SERPL IA-MCNC: 3.06 NG/ML
PROT SERPL-MCNC: 6.2 G/DL (ref 6–8.4)
PROT SERPL-MCNC: 6.2 G/DL (ref 6–8.4)
PROT SERPL-MCNC: 6.4 G/DL (ref 6–8.4)
PROT SERPL-MCNC: 6.5 G/DL (ref 6–8.4)
PROT SERPL-MCNC: 6.5 G/DL (ref 6–8.4)
PROT SERPL-MCNC: 6.7 G/DL (ref 6–8.4)
PROT SERPL-MCNC: 6.9 G/DL (ref 6–8.4)
PROT UR QL STRIP: ABNORMAL
PROTHROMBIN TIME: 12.6 SEC (ref 9–12.5)
PROTHROMBIN TIME: 13 SEC (ref 9–12.5)
PROTHROMBIN TIME: 13.3 SEC (ref 9–12.5)
PROTHROMBIN TIME: 13.4 SEC (ref 9–12.5)
PROTHROMBIN TIME: 14.4 SEC (ref 9–12.5)
PROTHROMBIN TIME: 16.1 SEC (ref 9–12.5)
PS: 10
RA MAJOR: 6.08 CM
RA WIDTH: 4.68 CM
RBC # BLD AUTO: 4.05 M/UL (ref 4.6–6.2)
RBC # BLD AUTO: 4.09 M/UL (ref 4.6–6.2)
RBC # BLD AUTO: 4.19 M/UL (ref 4.6–6.2)
RBC # BLD AUTO: 4.27 M/UL (ref 4.6–6.2)
RBC # BLD AUTO: 4.37 M/UL (ref 4.6–6.2)
RBC # BLD AUTO: 4.43 M/UL (ref 4.6–6.2)
RBC # BLD AUTO: 4.56 M/UL (ref 4.6–6.2)
RBC #/AREA URNS AUTO: >100 /HPF (ref 0–4)
RIGHT VENTRICULAR END-DIASTOLIC DIMENSION: 4.2 CM
SAMPLE: ABNORMAL
SCHISTOCYTES BLD QL SMEAR: ABNORMAL
SINUS: 3.7 CM
SITE: ABNORMAL
SODIUM SERPL-SCNC: 146 MMOL/L (ref 136–145)
SODIUM SERPL-SCNC: 147 MMOL/L (ref 136–145)
SODIUM SERPL-SCNC: 148 MMOL/L (ref 136–145)
SODIUM SERPL-SCNC: 149 MMOL/L (ref 136–145)
SODIUM SERPL-SCNC: 150 MMOL/L (ref 136–145)
SODIUM SERPL-SCNC: 151 MMOL/L (ref 136–145)
SODIUM SERPL-SCNC: 152 MMOL/L (ref 136–145)
SODIUM SERPL-SCNC: 152 MMOL/L (ref 136–145)
SODIUM SERPL-SCNC: 153 MMOL/L (ref 136–145)
SODIUM SERPL-SCNC: 153 MMOL/L (ref 136–145)
SODIUM SERPL-SCNC: 154 MMOL/L (ref 136–145)
SODIUM SERPL-SCNC: 155 MMOL/L (ref 136–145)
SODIUM SERPL-SCNC: 158 MMOL/L (ref 136–145)
SODIUM SERPL-SCNC: 158 MMOL/L (ref 136–145)
SODIUM SERPL-SCNC: 159 MMOL/L (ref 136–145)
SODIUM SERPL-SCNC: 159 MMOL/L (ref 136–145)
SODIUM SERPL-SCNC: 160 MMOL/L (ref 136–145)
SODIUM SERPL-SCNC: 160 MMOL/L (ref 136–145)
SODIUM SERPL-SCNC: 161 MMOL/L (ref 136–145)
SODIUM SERPL-SCNC: 161 MMOL/L (ref 136–145)
SP GR UR STRIP: 1.01 (ref 1–1.03)
SP02: 100
SP02: 95
SP02: 98
SP02: 99
SP02: 99
SQUAMOUS #/AREA URNS AUTO: 1 /HPF
STJ: 3.1 CM
TARGETS BLD QL SMEAR: ABNORMAL
TDI LATERAL: 0.11 M/S
TDI SEPTAL: 0.06 M/S
TDI: 0.09 M/S
TOXIC GRANULES BLD QL SMEAR: PRESENT
TOXIC GRANULES BLD QL SMEAR: PRESENT
TR MAX PG: 24 MMHG
TRIGL SERPL-MCNC: 201 MG/DL (ref 30–150)
TROPONIN I SERPL DL<=0.01 NG/ML-MCNC: 0.2 NG/ML (ref 0–0.03)
TSH SERPL DL<=0.005 MIU/L-ACNC: 3.92 UIU/ML (ref 0.4–4)
URN SPEC COLLECT METH UR: ABNORMAL
VANCOMYCIN SERPL-MCNC: 17.8 UG/ML
VANCOMYCIN SERPL-MCNC: 25.5 UG/ML
VANCOMYCIN SERPL-MCNC: 31.1 UG/ML
VT: 450
VT: 460
WBC # BLD AUTO: 11.53 K/UL (ref 3.9–12.7)
WBC # BLD AUTO: 13.62 K/UL (ref 3.9–12.7)
WBC # BLD AUTO: 13.64 K/UL (ref 3.9–12.7)
WBC # BLD AUTO: 15.05 K/UL (ref 3.9–12.7)
WBC # BLD AUTO: 15.65 K/UL (ref 3.9–12.7)
WBC # BLD AUTO: 20.44 K/UL (ref 3.9–12.7)
WBC # BLD AUTO: 9.33 K/UL (ref 3.9–12.7)
WBC #/AREA URNS AUTO: 2 /HPF (ref 0–5)
YEAST UR QL AUTO: ABNORMAL

## 2022-01-01 PROCEDURE — 99291 CRITICAL CARE FIRST HOUR: CPT | Mod: ,,, | Performed by: PSYCHIATRY & NEUROLOGY

## 2022-01-01 PROCEDURE — 83735 ASSAY OF MAGNESIUM: CPT | Mod: 91

## 2022-01-01 PROCEDURE — 63600175 PHARM REV CODE 636 W HCPCS

## 2022-01-01 PROCEDURE — 84132 ASSAY OF SERUM POTASSIUM: CPT | Performed by: PSYCHIATRY & NEUROLOGY

## 2022-01-01 PROCEDURE — 99223 1ST HOSP IP/OBS HIGH 75: CPT | Mod: ,,, | Performed by: CLINICAL NURSE SPECIALIST

## 2022-01-01 PROCEDURE — 94668 MNPJ CHEST WALL SBSQ: CPT

## 2022-01-01 PROCEDURE — 25000003 PHARM REV CODE 250: Performed by: NURSE PRACTITIONER

## 2022-01-01 PROCEDURE — 84145 PROCALCITONIN (PCT): CPT

## 2022-01-01 PROCEDURE — 93005 ELECTROCARDIOGRAM TRACING: CPT

## 2022-01-01 PROCEDURE — 25000003 PHARM REV CODE 250: Performed by: PHYSICIAN ASSISTANT

## 2022-01-01 PROCEDURE — 20000000 HC ICU ROOM

## 2022-01-01 PROCEDURE — 99233 SBSQ HOSP IP/OBS HIGH 50: CPT | Mod: ,,, | Performed by: INTERNAL MEDICINE

## 2022-01-01 PROCEDURE — 99291 CRITICAL CARE FIRST HOUR: CPT | Mod: ,,, | Performed by: STUDENT IN AN ORGANIZED HEALTH CARE EDUCATION/TRAINING PROGRAM

## 2022-01-01 PROCEDURE — 99900035 HC TECH TIME PER 15 MIN (STAT)

## 2022-01-01 PROCEDURE — 63600175 PHARM REV CODE 636 W HCPCS: Performed by: PSYCHIATRY & NEUROLOGY

## 2022-01-01 PROCEDURE — 99900026 HC AIRWAY MAINTENANCE (STAT)

## 2022-01-01 PROCEDURE — 85610 PROTHROMBIN TIME: CPT

## 2022-01-01 PROCEDURE — A9585 GADOBUTROL INJECTION: HCPCS | Performed by: PSYCHIATRY & NEUROLOGY

## 2022-01-01 PROCEDURE — 63600175 PHARM REV CODE 636 W HCPCS: Performed by: PHYSICIAN ASSISTANT

## 2022-01-01 PROCEDURE — 99291 CRITICAL CARE FIRST HOUR: CPT | Mod: 25,,, | Performed by: PSYCHIATRY & NEUROLOGY

## 2022-01-01 PROCEDURE — 99292 CRITICAL CARE ADDL 30 MIN: CPT | Mod: ,,, | Performed by: PHYSICIAN ASSISTANT

## 2022-01-01 PROCEDURE — 36600 WITHDRAWAL OF ARTERIAL BLOOD: CPT

## 2022-01-01 PROCEDURE — 27200966 HC CLOSED SUCTION SYSTEM

## 2022-01-01 PROCEDURE — 99223 PR INITIAL HOSPITAL CARE,LEVL III: ICD-10-PCS | Mod: GC,,, | Performed by: NEUROLOGICAL SURGERY

## 2022-01-01 PROCEDURE — 25500020 PHARM REV CODE 255: Performed by: PSYCHIATRY & NEUROLOGY

## 2022-01-01 PROCEDURE — 84100 ASSAY OF PHOSPHORUS: CPT

## 2022-01-01 PROCEDURE — 87106 FUNGI IDENTIFICATION YEAST: CPT | Performed by: NURSE PRACTITIONER

## 2022-01-01 PROCEDURE — 95718 EEG PHYS/QHP 2-12 HR W/VEEG: CPT | Mod: ,,, | Performed by: PSYCHIATRY & NEUROLOGY

## 2022-01-01 PROCEDURE — 25000003 PHARM REV CODE 250

## 2022-01-01 PROCEDURE — 51798 US URINE CAPACITY MEASURE: CPT

## 2022-01-01 PROCEDURE — 99233 PR SUBSEQUENT HOSPITAL CARE,LEVL III: ICD-10-PCS | Mod: GT,,, | Performed by: CLINICAL NURSE SPECIALIST

## 2022-01-01 PROCEDURE — 85730 THROMBOPLASTIN TIME PARTIAL: CPT

## 2022-01-01 PROCEDURE — 51701 INSERT BLADDER CATHETER: CPT

## 2022-01-01 PROCEDURE — 83735 ASSAY OF MAGNESIUM: CPT

## 2022-01-01 PROCEDURE — 80048 BASIC METABOLIC PNL TOTAL CA: CPT | Mod: 91

## 2022-01-01 PROCEDURE — 99291 PR CRITICAL CARE, E/M 30-74 MINUTES: ICD-10-PCS | Mod: GC,,, | Performed by: PSYCHIATRY & NEUROLOGY

## 2022-01-01 PROCEDURE — 94761 N-INVAS EAR/PLS OXIMETRY MLT: CPT

## 2022-01-01 PROCEDURE — 85610 PROTHROMBIN TIME: CPT | Performed by: NURSE PRACTITIONER

## 2022-01-01 PROCEDURE — 99238 HOSP IP/OBS DSCHRG MGMT 30/<: CPT | Mod: FS,,, | Performed by: PHYSICIAN ASSISTANT

## 2022-01-01 PROCEDURE — 94640 AIRWAY INHALATION TREATMENT: CPT

## 2022-01-01 PROCEDURE — 99223 1ST HOSP IP/OBS HIGH 75: CPT | Mod: ,,, | Performed by: STUDENT IN AN ORGANIZED HEALTH CARE EDUCATION/TRAINING PROGRAM

## 2022-01-01 PROCEDURE — 99233 PR SUBSEQUENT HOSPITAL CARE,LEVL III: ICD-10-PCS | Mod: ,,, | Performed by: INTERNAL MEDICINE

## 2022-01-01 PROCEDURE — 99291 CRITICAL CARE FIRST HOUR: CPT | Mod: GC,,, | Performed by: PSYCHIATRY & NEUROLOGY

## 2022-01-01 PROCEDURE — 80053 COMPREHEN METABOLIC PANEL: CPT | Performed by: NURSE PRACTITIONER

## 2022-01-01 PROCEDURE — 95700 EEG CONT REC W/VID EEG TECH: CPT

## 2022-01-01 PROCEDURE — 99223 1ST HOSP IP/OBS HIGH 75: CPT | Mod: FS,,, | Performed by: PHYSICIAN ASSISTANT

## 2022-01-01 PROCEDURE — 80048 BASIC METABOLIC PNL TOTAL CA: CPT | Mod: XB

## 2022-01-01 PROCEDURE — 97167 OT EVAL HIGH COMPLEX 60 MIN: CPT

## 2022-01-01 PROCEDURE — 85025 COMPLETE CBC W/AUTO DIFF WBC: CPT | Performed by: NURSE PRACTITIONER

## 2022-01-01 PROCEDURE — 93010 ELECTROCARDIOGRAM REPORT: CPT | Mod: ,,, | Performed by: INTERNAL MEDICINE

## 2022-01-01 PROCEDURE — 63600175 PHARM REV CODE 636 W HCPCS: Performed by: NURSE PRACTITIONER

## 2022-01-01 PROCEDURE — 99233 SBSQ HOSP IP/OBS HIGH 50: CPT | Mod: FS,,, | Performed by: PHYSICIAN ASSISTANT

## 2022-01-01 PROCEDURE — 82803 BLOOD GASES ANY COMBINATION: CPT

## 2022-01-01 PROCEDURE — 84100 ASSAY OF PHOSPHORUS: CPT | Mod: 91

## 2022-01-01 PROCEDURE — 80202 ASSAY OF VANCOMYCIN: CPT | Performed by: NURSE PRACTITIONER

## 2022-01-01 PROCEDURE — 80048 BASIC METABOLIC PNL TOTAL CA: CPT

## 2022-01-01 PROCEDURE — 86850 RBC ANTIBODY SCREEN: CPT | Performed by: NURSE PRACTITIONER

## 2022-01-01 PROCEDURE — 81001 URINALYSIS AUTO W/SCOPE: CPT | Performed by: NURSE PRACTITIONER

## 2022-01-01 PROCEDURE — 27000221 HC OXYGEN, UP TO 24 HOURS

## 2022-01-01 PROCEDURE — 84443 ASSAY THYROID STIM HORMONE: CPT | Performed by: NURSE PRACTITIONER

## 2022-01-01 PROCEDURE — 25000003 PHARM REV CODE 250: Performed by: PSYCHIATRY & NEUROLOGY

## 2022-01-01 PROCEDURE — 85027 COMPLETE CBC AUTOMATED: CPT | Performed by: NURSE PRACTITIONER

## 2022-01-01 PROCEDURE — 94003 VENT MGMT INPAT SUBQ DAY: CPT

## 2022-01-01 PROCEDURE — 87205 SMEAR GRAM STAIN: CPT | Performed by: NURSE PRACTITIONER

## 2022-01-01 PROCEDURE — 85007 BL SMEAR W/DIFF WBC COUNT: CPT | Performed by: NURSE PRACTITIONER

## 2022-01-01 PROCEDURE — 99223 1ST HOSP IP/OBS HIGH 75: CPT | Mod: GC,,, | Performed by: NEUROLOGICAL SURGERY

## 2022-01-01 PROCEDURE — 95718 PR EEG, W/VIDEO, CONT RECORD, I&R, 2-12 HRS: ICD-10-PCS | Mod: ,,, | Performed by: PSYCHIATRY & NEUROLOGY

## 2022-01-01 PROCEDURE — 99231 SBSQ HOSP IP/OBS SF/LOW 25: CPT | Mod: ,,, | Performed by: STUDENT IN AN ORGANIZED HEALTH CARE EDUCATION/TRAINING PROGRAM

## 2022-01-01 PROCEDURE — 99291 CRITICAL CARE FIRST HOUR: CPT | Mod: ,,, | Performed by: NURSE PRACTITIONER

## 2022-01-01 PROCEDURE — 84100 ASSAY OF PHOSPHORUS: CPT | Performed by: NURSE PRACTITIONER

## 2022-01-01 PROCEDURE — 80048 BASIC METABOLIC PNL TOTAL CA: CPT | Mod: XB | Performed by: PSYCHIATRY & NEUROLOGY

## 2022-01-01 PROCEDURE — 25000242 PHARM REV CODE 250 ALT 637 W/ HCPCS: Performed by: NURSE PRACTITIONER

## 2022-01-01 PROCEDURE — 87040 BLOOD CULTURE FOR BACTERIA: CPT | Performed by: INTERNAL MEDICINE

## 2022-01-01 PROCEDURE — 83605 ASSAY OF LACTIC ACID: CPT | Performed by: NURSE PRACTITIONER

## 2022-01-01 PROCEDURE — 99223 PR INITIAL HOSPITAL CARE,LEVL III: ICD-10-PCS | Mod: ,,, | Performed by: CLINICAL NURSE SPECIALIST

## 2022-01-01 PROCEDURE — 80061 LIPID PANEL: CPT | Performed by: NURSE PRACTITIONER

## 2022-01-01 PROCEDURE — 87040 BLOOD CULTURE FOR BACTERIA: CPT | Performed by: NURSE PRACTITIONER

## 2022-01-01 PROCEDURE — 80202 ASSAY OF VANCOMYCIN: CPT | Mod: 91 | Performed by: PSYCHIATRY & NEUROLOGY

## 2022-01-01 PROCEDURE — 83735 ASSAY OF MAGNESIUM: CPT | Performed by: NURSE PRACTITIONER

## 2022-01-01 PROCEDURE — 99223 PR INITIAL HOSPITAL CARE,LEVL III: ICD-10-PCS | Mod: FS,,, | Performed by: PHYSICIAN ASSISTANT

## 2022-01-01 PROCEDURE — 99291 PR CRITICAL CARE, E/M 30-74 MINUTES: ICD-10-PCS | Mod: ,,, | Performed by: PSYCHIATRY & NEUROLOGY

## 2022-01-01 PROCEDURE — 99233 SBSQ HOSP IP/OBS HIGH 50: CPT | Mod: ,,, | Performed by: PSYCHIATRY & NEUROLOGY

## 2022-01-01 PROCEDURE — 94667 MNPJ CHEST WALL 1ST: CPT

## 2022-01-01 PROCEDURE — 80048 BASIC METABOLIC PNL TOTAL CA: CPT | Mod: 91,XB

## 2022-01-01 PROCEDURE — 95714 VEEG EA 12-26 HR UNMNTR: CPT

## 2022-01-01 PROCEDURE — 99231 PR SUBSEQUENT HOSPITAL CARE,LEVL I: ICD-10-PCS | Mod: ,,, | Performed by: STUDENT IN AN ORGANIZED HEALTH CARE EDUCATION/TRAINING PROGRAM

## 2022-01-01 PROCEDURE — 99223 1ST HOSP IP/OBS HIGH 75: CPT | Mod: ,,, | Performed by: INTERNAL MEDICINE

## 2022-01-01 PROCEDURE — 99233 SBSQ HOSP IP/OBS HIGH 50: CPT | Mod: GT,,, | Performed by: CLINICAL NURSE SPECIALIST

## 2022-01-01 PROCEDURE — 83880 ASSAY OF NATRIURETIC PEPTIDE: CPT | Performed by: NURSE PRACTITIONER

## 2022-01-01 PROCEDURE — 80202 ASSAY OF VANCOMYCIN: CPT | Performed by: PSYCHIATRY & NEUROLOGY

## 2022-01-01 PROCEDURE — 99238 PR HOSPITAL DISCHARGE DAY,<30 MIN: ICD-10-PCS | Mod: FS,,, | Performed by: PHYSICIAN ASSISTANT

## 2022-01-01 PROCEDURE — 99223 PR INITIAL HOSPITAL CARE,LEVL III: ICD-10-PCS | Mod: ,,, | Performed by: STUDENT IN AN ORGANIZED HEALTH CARE EDUCATION/TRAINING PROGRAM

## 2022-01-01 PROCEDURE — 95718 EEG PHYS/QHP 2-12 HR W/VEEG: CPT | Mod: ,,, | Performed by: STUDENT IN AN ORGANIZED HEALTH CARE EDUCATION/TRAINING PROGRAM

## 2022-01-01 PROCEDURE — 99233 PR SUBSEQUENT HOSPITAL CARE,LEVL III: ICD-10-PCS | Mod: FS,,, | Performed by: PHYSICIAN ASSISTANT

## 2022-01-01 PROCEDURE — 99223 PR INITIAL HOSPITAL CARE,LEVL III: ICD-10-PCS | Mod: ,,, | Performed by: INTERNAL MEDICINE

## 2022-01-01 PROCEDURE — 99233 PR SUBSEQUENT HOSPITAL CARE,LEVL III: ICD-10-PCS | Mod: ,,, | Performed by: PSYCHIATRY & NEUROLOGY

## 2022-01-01 PROCEDURE — 85730 THROMBOPLASTIN TIME PARTIAL: CPT | Performed by: NURSE PRACTITIONER

## 2022-01-01 PROCEDURE — 99291 PR CRITICAL CARE, E/M 30-74 MINUTES: ICD-10-PCS | Mod: ,,, | Performed by: NURSE PRACTITIONER

## 2022-01-01 PROCEDURE — 95718 PR EEG, W/VIDEO, CONT RECORD, I&R, 2-12 HRS: ICD-10-PCS | Mod: ,,, | Performed by: STUDENT IN AN ORGANIZED HEALTH CARE EDUCATION/TRAINING PROGRAM

## 2022-01-01 PROCEDURE — 99232 SBSQ HOSP IP/OBS MODERATE 35: CPT | Mod: GC,,, | Performed by: NEUROLOGICAL SURGERY

## 2022-01-01 PROCEDURE — 99291 PR CRITICAL CARE, E/M 30-74 MINUTES: ICD-10-PCS | Mod: 25,,, | Performed by: PSYCHIATRY & NEUROLOGY

## 2022-01-01 PROCEDURE — 97112 NEUROMUSCULAR REEDUCATION: CPT

## 2022-01-01 PROCEDURE — 84484 ASSAY OF TROPONIN QUANT: CPT | Performed by: NURSE PRACTITIONER

## 2022-01-01 PROCEDURE — 82800 BLOOD PH: CPT

## 2022-01-01 PROCEDURE — 99292 PR CRITICAL CARE, ADDL 30 MIN: ICD-10-PCS | Mod: ,,, | Performed by: PHYSICIAN ASSISTANT

## 2022-01-01 PROCEDURE — 83605 ASSAY OF LACTIC ACID: CPT

## 2022-01-01 PROCEDURE — 95711 VEEG 2-12 HR UNMONITORED: CPT

## 2022-01-01 PROCEDURE — 99232 PR SUBSEQUENT HOSPITAL CARE,LEVL II: ICD-10-PCS | Mod: GC,,, | Performed by: NEUROLOGICAL SURGERY

## 2022-01-01 PROCEDURE — 82553 CREATINE MB FRACTION: CPT | Performed by: NURSE PRACTITIONER

## 2022-01-01 PROCEDURE — 99291 PR CRITICAL CARE, E/M 30-74 MINUTES: ICD-10-PCS | Mod: ,,, | Performed by: STUDENT IN AN ORGANIZED HEALTH CARE EDUCATION/TRAINING PROGRAM

## 2022-01-01 PROCEDURE — 83036 HEMOGLOBIN GLYCOSYLATED A1C: CPT | Performed by: NURSE PRACTITIONER

## 2022-01-01 PROCEDURE — 25000242 PHARM REV CODE 250 ALT 637 W/ HCPCS: Performed by: PSYCHIATRY & NEUROLOGY

## 2022-01-01 PROCEDURE — 93010 EKG 12-LEAD: ICD-10-PCS | Mod: ,,, | Performed by: INTERNAL MEDICINE

## 2022-01-01 PROCEDURE — 94002 VENT MGMT INPAT INIT DAY: CPT

## 2022-01-01 PROCEDURE — 87070 CULTURE OTHR SPECIMN AEROBIC: CPT | Performed by: NURSE PRACTITIONER

## 2022-01-01 RX ORDER — MAGNESIUM SULFATE HEPTAHYDRATE 40 MG/ML
4 INJECTION, SOLUTION INTRAVENOUS
Status: DISCONTINUED | OUTPATIENT
Start: 2022-01-01 | End: 2022-01-01

## 2022-01-01 RX ORDER — GADOBUTROL 604.72 MG/ML
10 INJECTION INTRAVENOUS
Status: COMPLETED | OUTPATIENT
Start: 2022-01-01 | End: 2022-01-01

## 2022-01-01 RX ORDER — MUPIROCIN 20 MG/G
OINTMENT TOPICAL 2 TIMES DAILY
Status: COMPLETED | OUTPATIENT
Start: 2022-01-01 | End: 2022-01-01

## 2022-01-01 RX ORDER — SODIUM CHLORIDE 0.9 % (FLUSH) 0.9 %
10 SYRINGE (ML) INJECTION
Status: DISCONTINUED | OUTPATIENT
Start: 2022-01-01 | End: 2022-01-01

## 2022-01-01 RX ORDER — AMIODARONE HYDROCHLORIDE 200 MG/1
200 TABLET ORAL DAILY
Status: DISCONTINUED | OUTPATIENT
Start: 2022-01-01 | End: 2022-01-01

## 2022-01-01 RX ORDER — SODIUM,POTASSIUM PHOSPHATES 280-250MG
2 POWDER IN PACKET (EA) ORAL
Status: DISCONTINUED | OUTPATIENT
Start: 2022-01-01 | End: 2022-01-01

## 2022-01-01 RX ORDER — MAGNESIUM SULFATE HEPTAHYDRATE 40 MG/ML
2 INJECTION, SOLUTION INTRAVENOUS
Status: ACTIVE | OUTPATIENT
Start: 2022-01-01 | End: 2022-01-01

## 2022-01-01 RX ORDER — ACETAMINOPHEN 325 MG/1
650 TABLET ORAL EVERY 6 HOURS PRN
Status: DISCONTINUED | OUTPATIENT
Start: 2022-01-01 | End: 2022-01-01

## 2022-01-01 RX ORDER — ACETAMINOPHEN 650 MG/1
650 SUPPOSITORY RECTAL EVERY 6 HOURS PRN
Status: DISCONTINUED | OUTPATIENT
Start: 2022-01-01 | End: 2022-01-01

## 2022-01-01 RX ORDER — WARFARIN SODIUM 5 MG/1
5 TABLET ORAL
COMMUNITY
Start: 2022-01-01

## 2022-01-01 RX ORDER — GLUCAGON 1 MG
1 KIT INJECTION
Status: DISCONTINUED | OUTPATIENT
Start: 2022-01-01 | End: 2022-01-01

## 2022-01-01 RX ORDER — LORAZEPAM 2 MG/ML
2 INJECTION INTRAMUSCULAR ONCE
Status: COMPLETED | OUTPATIENT
Start: 2022-01-01 | End: 2022-01-01

## 2022-01-01 RX ORDER — LEVETIRACETAM 500 MG/5ML
1000 INJECTION, SOLUTION, CONCENTRATE INTRAVENOUS EVERY 12 HOURS
Status: DISCONTINUED | OUTPATIENT
Start: 2022-01-01 | End: 2022-01-01

## 2022-01-01 RX ORDER — ASPIRIN 81 MG/1
81 TABLET ORAL DAILY
COMMUNITY

## 2022-01-01 RX ORDER — NOREPINEPHRINE BITARTRATE/D5W 4MG/250ML
0-.2 PLASTIC BAG, INJECTION (ML) INTRAVENOUS CONTINUOUS
Status: DISCONTINUED | OUTPATIENT
Start: 2022-01-01 | End: 2022-01-01

## 2022-01-01 RX ORDER — SACUBITRIL AND VALSARTAN 49; 51 MG/1; MG/1
TABLET, FILM COATED ORAL
COMMUNITY

## 2022-01-01 RX ORDER — DEXTROSE MONOHYDRATE 100 MG/ML
INJECTION, SOLUTION INTRAVENOUS CONTINUOUS PRN
Status: DISCONTINUED | OUTPATIENT
Start: 2022-01-01 | End: 2022-01-01

## 2022-01-01 RX ORDER — ACETAZOLAMIDE 500 MG/5ML
500 INJECTION, POWDER, LYOPHILIZED, FOR SOLUTION INTRAVENOUS EVERY 12 HOURS
Status: COMPLETED | OUTPATIENT
Start: 2022-01-01 | End: 2022-01-01

## 2022-01-01 RX ORDER — LEVETIRACETAM 500 MG/5ML
500 INJECTION, SOLUTION, CONCENTRATE INTRAVENOUS EVERY 12 HOURS
Status: DISCONTINUED | OUTPATIENT
Start: 2022-01-01 | End: 2022-01-01

## 2022-01-01 RX ORDER — INSULIN ASPART 100 [IU]/ML
0-5 INJECTION, SOLUTION INTRAVENOUS; SUBCUTANEOUS
Status: DISCONTINUED | OUTPATIENT
Start: 2022-01-01 | End: 2022-01-01

## 2022-01-01 RX ORDER — AMIODARONE HYDROCHLORIDE 200 MG/1
200 TABLET ORAL
COMMUNITY
Start: 2022-01-01

## 2022-01-01 RX ORDER — POTASSIUM CHLORIDE 7.45 MG/ML
10 INJECTION INTRAVENOUS ONCE
Status: COMPLETED | OUTPATIENT
Start: 2022-01-01 | End: 2022-01-01

## 2022-01-01 RX ORDER — POTASSIUM CHLORIDE 14.9 MG/ML
20 INJECTION INTRAVENOUS
Status: COMPLETED | OUTPATIENT
Start: 2022-01-01 | End: 2022-01-01

## 2022-01-01 RX ORDER — AMOXICILLIN 250 MG
1 CAPSULE ORAL 2 TIMES DAILY
Status: DISCONTINUED | OUTPATIENT
Start: 2022-01-01 | End: 2022-01-01

## 2022-01-01 RX ORDER — METOPROLOL SUCCINATE 100 MG/1
100 TABLET, EXTENDED RELEASE ORAL DAILY
COMMUNITY
Start: 2022-01-01

## 2022-01-01 RX ORDER — POTASSIUM CHLORIDE 7.45 MG/ML
10 INJECTION INTRAVENOUS
Status: COMPLETED | OUTPATIENT
Start: 2022-01-01 | End: 2022-01-01

## 2022-01-01 RX ORDER — LORAZEPAM 2 MG/ML
INJECTION INTRAMUSCULAR
Status: COMPLETED
Start: 2022-01-01 | End: 2022-01-01

## 2022-01-01 RX ORDER — NOREPINEPHRINE BITARTRATE/D5W 4MG/250ML
0-3 PLASTIC BAG, INJECTION (ML) INTRAVENOUS CONTINUOUS
Status: DISCONTINUED | OUTPATIENT
Start: 2022-01-01 | End: 2022-01-01

## 2022-01-01 RX ORDER — LORAZEPAM 2 MG/ML
1 INJECTION INTRAMUSCULAR EVERY 30 MIN PRN
Status: DISCONTINUED | OUTPATIENT
Start: 2022-01-01 | End: 2022-01-01

## 2022-01-01 RX ORDER — FAMOTIDINE 20 MG/1
20 TABLET, FILM COATED ORAL 2 TIMES DAILY
Status: DISCONTINUED | OUTPATIENT
Start: 2022-01-01 | End: 2022-01-01

## 2022-01-01 RX ORDER — CALCIUM GLUCONATE 20 MG/ML
1 INJECTION, SOLUTION INTRAVENOUS
Status: DISCONTINUED | OUTPATIENT
Start: 2022-01-01 | End: 2022-01-01

## 2022-01-01 RX ORDER — LEVETIRACETAM 500 MG/5ML
1000 INJECTION, SOLUTION, CONCENTRATE INTRAVENOUS EVERY 12 HOURS
Status: DISCONTINUED | OUTPATIENT
Start: 2022-01-01 | End: 2022-01-01 | Stop reason: HOSPADM

## 2022-01-01 RX ORDER — FAMOTIDINE 10 MG/ML
20 INJECTION INTRAVENOUS DAILY
Status: DISCONTINUED | OUTPATIENT
Start: 2022-01-01 | End: 2022-01-01

## 2022-01-01 RX ORDER — FUROSEMIDE 80 MG/1
80 TABLET ORAL EVERY MORNING
COMMUNITY
Start: 2022-01-01

## 2022-01-01 RX ORDER — POTASSIUM CHLORIDE 29.8 MG/ML
40 INJECTION INTRAVENOUS
Status: DISCONTINUED | OUTPATIENT
Start: 2022-01-01 | End: 2022-01-01

## 2022-01-01 RX ORDER — INSULIN ASPART 100 [IU]/ML
6 INJECTION, SOLUTION INTRAVENOUS; SUBCUTANEOUS
Status: DISCONTINUED | OUTPATIENT
Start: 2022-01-01 | End: 2022-01-01

## 2022-01-01 RX ORDER — FENTANYL CITRATE 50 UG/ML
25 INJECTION, SOLUTION INTRAMUSCULAR; INTRAVENOUS ONCE
Status: COMPLETED | OUTPATIENT
Start: 2022-01-01 | End: 2022-01-01

## 2022-01-01 RX ORDER — POTASSIUM CHLORIDE 7.45 MG/ML
10 INJECTION INTRAVENOUS
Status: DISPENSED | OUTPATIENT
Start: 2022-01-01 | End: 2022-01-01

## 2022-01-01 RX ORDER — LORAZEPAM 2 MG/ML
1 INJECTION INTRAMUSCULAR EVERY 30 MIN PRN
Status: DISCONTINUED | OUTPATIENT
Start: 2022-01-01 | End: 2022-01-01 | Stop reason: HOSPADM

## 2022-01-01 RX ORDER — CALCIUM GLUCONATE 20 MG/ML
2 INJECTION, SOLUTION INTRAVENOUS
Status: DISCONTINUED | OUTPATIENT
Start: 2022-01-01 | End: 2022-01-01

## 2022-01-01 RX ORDER — POTASSIUM CHLORIDE 29.8 MG/ML
40 INJECTION INTRAVENOUS ONCE
Status: COMPLETED | OUTPATIENT
Start: 2022-01-01 | End: 2022-01-01

## 2022-01-01 RX ORDER — ONDANSETRON 2 MG/ML
8 INJECTION INTRAMUSCULAR; INTRAVENOUS EVERY 8 HOURS PRN
Status: DISCONTINUED | OUTPATIENT
Start: 2022-01-01 | End: 2022-01-01 | Stop reason: HOSPADM

## 2022-01-01 RX ORDER — VANCOMYCIN HCL IN 5 % DEXTROSE 1G/250ML
1000 PLASTIC BAG, INJECTION (ML) INTRAVENOUS ONCE
Status: COMPLETED | OUTPATIENT
Start: 2022-01-01 | End: 2022-01-01

## 2022-01-01 RX ORDER — LANOLIN ALCOHOL/MO/W.PET/CERES
800 CREAM (GRAM) TOPICAL
Status: DISCONTINUED | OUTPATIENT
Start: 2022-01-01 | End: 2022-01-01

## 2022-01-01 RX ORDER — MAGNESIUM SULFATE HEPTAHYDRATE 40 MG/ML
2 INJECTION, SOLUTION INTRAVENOUS
Status: DISCONTINUED | OUTPATIENT
Start: 2022-01-01 | End: 2022-01-01

## 2022-01-01 RX ORDER — MAGNESIUM SULFATE HEPTAHYDRATE 40 MG/ML
4 INJECTION, SOLUTION INTRAVENOUS
Status: ACTIVE | OUTPATIENT
Start: 2022-01-01 | End: 2022-01-01

## 2022-01-01 RX ORDER — MUPIROCIN 20 MG/G
OINTMENT TOPICAL 2 TIMES DAILY
Status: CANCELLED | OUTPATIENT
Start: 2022-01-01 | End: 2022-01-01

## 2022-01-01 RX ORDER — LABETALOL HCL 20 MG/4 ML
10 SYRINGE (ML) INTRAVENOUS EVERY 4 HOURS PRN
Status: DISCONTINUED | OUTPATIENT
Start: 2022-01-01 | End: 2022-01-01

## 2022-01-01 RX ORDER — FUROSEMIDE 10 MG/ML
80 INJECTION INTRAMUSCULAR; INTRAVENOUS ONCE
Status: COMPLETED | OUTPATIENT
Start: 2022-01-01 | End: 2022-01-01

## 2022-01-01 RX ORDER — INSULIN ASPART 100 [IU]/ML
0-5 INJECTION, SOLUTION INTRAVENOUS; SUBCUTANEOUS EVERY 4 HOURS PRN
Status: DISCONTINUED | OUTPATIENT
Start: 2022-01-01 | End: 2022-01-01

## 2022-01-01 RX ORDER — ACETAMINOPHEN 10 MG/ML
1000 INJECTION, SOLUTION INTRAVENOUS ONCE
Status: COMPLETED | OUTPATIENT
Start: 2022-01-01 | End: 2022-01-01

## 2022-01-01 RX ORDER — IPRATROPIUM BROMIDE AND ALBUTEROL SULFATE 2.5; .5 MG/3ML; MG/3ML
3 SOLUTION RESPIRATORY (INHALATION) ONCE
Status: COMPLETED | OUTPATIENT
Start: 2022-01-01 | End: 2022-01-01

## 2022-01-01 RX ORDER — IPRATROPIUM BROMIDE AND ALBUTEROL SULFATE 2.5; .5 MG/3ML; MG/3ML
3 SOLUTION RESPIRATORY (INHALATION) EVERY 4 HOURS PRN
Status: DISCONTINUED | OUTPATIENT
Start: 2022-01-01 | End: 2022-01-01

## 2022-01-01 RX ORDER — INSULIN ASPART 100 [IU]/ML
4 INJECTION, SOLUTION INTRAVENOUS; SUBCUTANEOUS
Status: DISCONTINUED | OUTPATIENT
Start: 2022-01-01 | End: 2022-01-01

## 2022-01-01 RX ORDER — POTASSIUM CHLORIDE 14.9 MG/ML
60 INJECTION INTRAVENOUS
Status: DISPENSED | OUTPATIENT
Start: 2022-01-01 | End: 2022-01-01

## 2022-01-01 RX ORDER — POTASSIUM CHLORIDE 14.9 MG/ML
40 INJECTION INTRAVENOUS
Status: DISCONTINUED | OUTPATIENT
Start: 2022-01-01 | End: 2022-01-01

## 2022-01-01 RX ORDER — POTASSIUM CHLORIDE 29.8 MG/ML
40 INJECTION INTRAVENOUS
Status: ACTIVE | OUTPATIENT
Start: 2022-01-01 | End: 2022-01-01

## 2022-01-01 RX ORDER — INSULIN ASPART 100 [IU]/ML
1-10 INJECTION, SOLUTION INTRAVENOUS; SUBCUTANEOUS EVERY 4 HOURS PRN
Status: DISCONTINUED | OUTPATIENT
Start: 2022-01-01 | End: 2022-01-01

## 2022-01-01 RX ORDER — CALCIUM GLUCONATE 20 MG/ML
3 INJECTION, SOLUTION INTRAVENOUS
Status: DISCONTINUED | OUTPATIENT
Start: 2022-01-01 | End: 2022-01-01

## 2022-01-01 RX ORDER — ACETAMINOPHEN 650 MG/20.3ML
650 LIQUID ORAL EVERY 6 HOURS PRN
Status: DISCONTINUED | OUTPATIENT
Start: 2022-01-01 | End: 2022-01-01

## 2022-01-01 RX ORDER — INSULIN ASPART 100 [IU]/ML
8 INJECTION, SOLUTION INTRAVENOUS; SUBCUTANEOUS
Status: DISCONTINUED | OUTPATIENT
Start: 2022-01-01 | End: 2022-01-01

## 2022-01-01 RX ORDER — PRAVASTATIN SODIUM 40 MG/1
40 TABLET ORAL DAILY
COMMUNITY
Start: 2022-01-01

## 2022-01-01 RX ORDER — SCOLOPAMINE TRANSDERMAL SYSTEM 1 MG/1
1 PATCH, EXTENDED RELEASE TRANSDERMAL
Status: DISCONTINUED | OUTPATIENT
Start: 2022-01-01 | End: 2022-01-01 | Stop reason: HOSPADM

## 2022-01-01 RX ORDER — POTASSIUM CHLORIDE 29.8 MG/ML
80 INJECTION INTRAVENOUS
Status: ACTIVE | OUTPATIENT
Start: 2022-01-01 | End: 2022-01-01

## 2022-01-01 RX ORDER — SODIUM CHLORIDE 450 MG/100ML
INJECTION, SOLUTION INTRAVENOUS CONTINUOUS
Status: DISCONTINUED | OUTPATIENT
Start: 2022-01-01 | End: 2022-01-01

## 2022-01-01 RX ORDER — NOREPINEPHRINE BITARTRATE/D5W 4MG/250ML
PLASTIC BAG, INJECTION (ML) INTRAVENOUS
Status: DISPENSED
Start: 2022-01-01 | End: 2022-01-01

## 2022-01-01 RX ORDER — SODIUM CHLORIDE 450 MG/100ML
INJECTION, SOLUTION INTRAVENOUS CONTINUOUS
Status: ACTIVE | OUTPATIENT
Start: 2022-01-01 | End: 2022-01-01

## 2022-01-01 RX ORDER — MIDAZOLAM HYDROCHLORIDE 1 MG/ML
3 INJECTION, SOLUTION INTRAVENOUS CONTINUOUS
Status: DISCONTINUED | OUTPATIENT
Start: 2022-01-01 | End: 2022-01-01

## 2022-01-01 RX ORDER — FENTANYL CITRATE-0.9 % NACL/PF 10 MCG/ML
0-250 PLASTIC BAG, INJECTION (ML) INTRAVENOUS CONTINUOUS
Status: DISCONTINUED | OUTPATIENT
Start: 2022-01-01 | End: 2022-01-01

## 2022-01-01 RX ORDER — IBUPROFEN 200 MG
24 TABLET ORAL
Status: DISCONTINUED | OUTPATIENT
Start: 2022-01-01 | End: 2022-01-01

## 2022-01-01 RX ORDER — HEPARIN SODIUM 5000 [USP'U]/ML
5000 INJECTION, SOLUTION INTRAVENOUS; SUBCUTANEOUS EVERY 8 HOURS
Status: DISCONTINUED | OUTPATIENT
Start: 2022-01-01 | End: 2022-01-01

## 2022-01-01 RX ORDER — ONDANSETRON 2 MG/ML
4 INJECTION INTRAMUSCULAR; INTRAVENOUS EVERY 8 HOURS PRN
Status: DISCONTINUED | OUTPATIENT
Start: 2022-01-01 | End: 2022-01-01

## 2022-01-01 RX ORDER — IBUPROFEN 200 MG
16 TABLET ORAL
Status: DISCONTINUED | OUTPATIENT
Start: 2022-01-01 | End: 2022-01-01

## 2022-01-01 RX ORDER — NOREPINEPHRINE BITARTRATE/D5W 4MG/250ML
0-.2 PLASTIC BAG, INJECTION (ML) INTRAVENOUS CONTINUOUS
Status: ACTIVE | OUTPATIENT
Start: 2022-01-01 | End: 2022-01-01

## 2022-01-01 RX ORDER — LEVETIRACETAM 500 MG/5ML
INJECTION, SOLUTION, CONCENTRATE INTRAVENOUS
Status: COMPLETED
Start: 2022-01-01 | End: 2022-01-01

## 2022-01-01 RX ORDER — MORPHINE SULFATE 1 MG/ML
0-10 INJECTION, SOLUTION INTRAVENOUS CONTINUOUS
Status: DISCONTINUED | OUTPATIENT
Start: 2022-01-01 | End: 2022-01-01 | Stop reason: HOSPADM

## 2022-01-01 RX ADMIN — ACETAMINOPHEN 650 MG: 325 TABLET ORAL at 10:11

## 2022-01-01 RX ADMIN — MUPIROCIN: 20 OINTMENT TOPICAL at 08:11

## 2022-01-01 RX ADMIN — POTASSIUM BICARBONATE 35 MEQ: 391 TABLET, EFFERVESCENT ORAL at 04:11

## 2022-01-01 RX ADMIN — LEVETIRACETAM 500 MG: 100 INJECTION, SOLUTION INTRAVENOUS at 09:11

## 2022-01-01 RX ADMIN — INSULIN ASPART 8 UNITS: 100 INJECTION, SOLUTION INTRAVENOUS; SUBCUTANEOUS at 07:11

## 2022-01-01 RX ADMIN — POTASSIUM BICARBONATE 35 MEQ: 391 TABLET, EFFERVESCENT ORAL at 09:11

## 2022-01-01 RX ADMIN — INSULIN ASPART 8 UNITS: 100 INJECTION, SOLUTION INTRAVENOUS; SUBCUTANEOUS at 12:11

## 2022-01-01 RX ADMIN — INSULIN ASPART 2 UNITS: 100 INJECTION, SOLUTION INTRAVENOUS; SUBCUTANEOUS at 10:11

## 2022-01-01 RX ADMIN — ACETAMINOPHEN 650 MG: 650 SOLUTION ORAL at 05:11

## 2022-01-01 RX ADMIN — INSULIN ASPART 6 UNITS: 100 INJECTION, SOLUTION INTRAVENOUS; SUBCUTANEOUS at 08:11

## 2022-01-01 RX ADMIN — INSULIN ASPART 6 UNITS: 100 INJECTION, SOLUTION INTRAVENOUS; SUBCUTANEOUS at 12:11

## 2022-01-01 RX ADMIN — POTASSIUM BICARBONATE 35 MEQ: 391 TABLET, EFFERVESCENT ORAL at 02:11

## 2022-01-01 RX ADMIN — INSULIN ASPART 4 UNITS: 100 INJECTION, SOLUTION INTRAVENOUS; SUBCUTANEOUS at 08:11

## 2022-01-01 RX ADMIN — FUROSEMIDE 80 MG: 10 INJECTION, SOLUTION INTRAMUSCULAR; INTRAVENOUS at 04:11

## 2022-01-01 RX ADMIN — AMIODARONE HYDROCHLORIDE 200 MG: 200 TABLET ORAL at 09:11

## 2022-01-01 RX ADMIN — FAMOTIDINE 20 MG: 20 TABLET ORAL at 08:11

## 2022-01-01 RX ADMIN — POTASSIUM CHLORIDE 20 MEQ: 200 INJECTION, SOLUTION INTRAVENOUS at 04:11

## 2022-01-01 RX ADMIN — INSULIN ASPART 4 UNITS: 100 INJECTION, SOLUTION INTRAVENOUS; SUBCUTANEOUS at 12:11

## 2022-01-01 RX ADMIN — LORAZEPAM 2 MG: 2 INJECTION INTRAMUSCULAR; INTRAVENOUS at 05:11

## 2022-01-01 RX ADMIN — POTASSIUM CHLORIDE 10 MEQ: 7.46 INJECTION, SOLUTION INTRAVENOUS at 02:11

## 2022-01-01 RX ADMIN — INSULIN ASPART 6 UNITS: 100 INJECTION, SOLUTION INTRAVENOUS; SUBCUTANEOUS at 04:11

## 2022-01-01 RX ADMIN — ACETAZOLAMIDE 500 MG: 500 INJECTION, POWDER, LYOPHILIZED, FOR SOLUTION INTRAVENOUS at 09:11

## 2022-01-01 RX ADMIN — OXACILLIN SODIUM 12 G: 10 INJECTION, POWDER, FOR SOLUTION INTRAVENOUS at 09:11

## 2022-01-01 RX ADMIN — POTASSIUM CHLORIDE 10 MEQ: 10 INJECTION, SOLUTION INTRAVENOUS at 11:11

## 2022-01-01 RX ADMIN — LEVETIRACETAM 1000 MG: 100 INJECTION, SOLUTION INTRAVENOUS at 09:11

## 2022-01-01 RX ADMIN — FAMOTIDINE 20 MG: 10 INJECTION INTRAVENOUS at 08:11

## 2022-01-01 RX ADMIN — HEPARIN SODIUM 5000 UNITS: 5000 INJECTION INTRAVENOUS; SUBCUTANEOUS at 02:11

## 2022-01-01 RX ADMIN — INSULIN ASPART 2 UNITS: 100 INJECTION, SOLUTION INTRAVENOUS; SUBCUTANEOUS at 05:11

## 2022-01-01 RX ADMIN — AMIODARONE HYDROCHLORIDE 200 MG: 200 TABLET ORAL at 08:11

## 2022-01-01 RX ADMIN — SENNOSIDES AND DOCUSATE SODIUM 1 TABLET: 50; 8.6 TABLET ORAL at 08:11

## 2022-01-01 RX ADMIN — HEPARIN SODIUM 5000 UNITS: 5000 INJECTION INTRAVENOUS; SUBCUTANEOUS at 10:11

## 2022-01-01 RX ADMIN — NOREPINEPHRINE BITARTRATE 0.02 MCG/KG/MIN: 4 INJECTION, SOLUTION INTRAVENOUS at 01:11

## 2022-01-01 RX ADMIN — HEPARIN SODIUM 5000 UNITS: 5000 INJECTION INTRAVENOUS; SUBCUTANEOUS at 01:11

## 2022-01-01 RX ADMIN — OXACILLIN SODIUM 12 G: 10 INJECTION, POWDER, FOR SOLUTION INTRAVENOUS at 05:11

## 2022-01-01 RX ADMIN — ACETAMINOPHEN 650 MG: 325 TABLET ORAL at 03:11

## 2022-01-01 RX ADMIN — SENNOSIDES AND DOCUSATE SODIUM 1 TABLET: 50; 8.6 TABLET ORAL at 09:11

## 2022-01-01 RX ADMIN — PIPERACILLIN SODIUM AND TAZOBACTAM SODIUM 4.5 G: 4; .5 INJECTION, POWDER, LYOPHILIZED, FOR SOLUTION INTRAVENOUS at 02:11

## 2022-01-01 RX ADMIN — LEVETIRACETAM 1000 MG: 100 INJECTION, SOLUTION INTRAVENOUS at 01:11

## 2022-01-01 RX ADMIN — POTASSIUM BICARBONATE 30 MEQ: 391 TABLET, EFFERVESCENT ORAL at 04:11

## 2022-01-01 RX ADMIN — INSULIN ASPART 4 UNITS: 100 INJECTION, SOLUTION INTRAVENOUS; SUBCUTANEOUS at 03:11

## 2022-01-01 RX ADMIN — PIPERACILLIN SODIUM AND TAZOBACTAM SODIUM 4.5 G: 4; .5 INJECTION, POWDER, LYOPHILIZED, FOR SOLUTION INTRAVENOUS at 08:11

## 2022-01-01 RX ADMIN — INSULIN ASPART 4 UNITS: 100 INJECTION, SOLUTION INTRAVENOUS; SUBCUTANEOUS at 10:11

## 2022-01-01 RX ADMIN — AMIODARONE HYDROCHLORIDE 150 MG: 1.5 INJECTION, SOLUTION INTRAVENOUS at 05:11

## 2022-01-01 RX ADMIN — MIDAZOLAM HYDROCHLORIDE 3 MG/HR: 1 INJECTION, SOLUTION INTRAVENOUS at 03:11

## 2022-01-01 RX ADMIN — POTASSIUM BICARBONATE 35 MEQ: 391 TABLET, EFFERVESCENT ORAL at 06:11

## 2022-01-01 RX ADMIN — AMIODARONE HYDROCHLORIDE 0.5 MG/MIN: 1.8 INJECTION, SOLUTION INTRAVENOUS at 06:11

## 2022-01-01 RX ADMIN — POTASSIUM CHLORIDE 10 MEQ: 7.46 INJECTION, SOLUTION INTRAVENOUS at 04:11

## 2022-01-01 RX ADMIN — INSULIN ASPART 8 UNITS: 100 INJECTION, SOLUTION INTRAVENOUS; SUBCUTANEOUS at 11:11

## 2022-01-01 RX ADMIN — INSULIN ASPART 2 UNITS: 100 INJECTION, SOLUTION INTRAVENOUS; SUBCUTANEOUS at 04:11

## 2022-01-01 RX ADMIN — INSULIN ASPART 8 UNITS: 100 INJECTION, SOLUTION INTRAVENOUS; SUBCUTANEOUS at 08:11

## 2022-01-01 RX ADMIN — Medication 50 MCG/HR: at 02:11

## 2022-01-01 RX ADMIN — HEPARIN SODIUM 5000 UNITS: 5000 INJECTION INTRAVENOUS; SUBCUTANEOUS at 05:11

## 2022-01-01 RX ADMIN — NOREPINEPHRINE BITARTRATE 0.02 MCG/KG/MIN: 4 INJECTION, SOLUTION INTRAVENOUS at 04:11

## 2022-01-01 RX ADMIN — HEPARIN SODIUM 5000 UNITS: 5000 INJECTION INTRAVENOUS; SUBCUTANEOUS at 09:11

## 2022-01-01 RX ADMIN — POTASSIUM CHLORIDE 60 MEQ: 14.9 INJECTION, SOLUTION INTRAVENOUS at 05:11

## 2022-01-01 RX ADMIN — NOREPINEPHRINE BITARTRATE 0.02 MCG/KG/MIN: 4 INJECTION, SOLUTION INTRAVENOUS at 05:11

## 2022-01-01 RX ADMIN — SODIUM PHOSPHATE, MONOBASIC, MONOHYDRATE AND SODIUM PHOSPHATE, DIBASIC, ANHYDROUS 20.01 MMOL: 142; 276 INJECTION, SOLUTION INTRAVENOUS at 08:11

## 2022-01-01 RX ADMIN — INSULIN ASPART 3 UNITS: 100 INJECTION, SOLUTION INTRAVENOUS; SUBCUTANEOUS at 12:11

## 2022-01-01 RX ADMIN — LEVETIRACETAM 1000 MG: 100 INJECTION, SOLUTION INTRAVENOUS at 08:11

## 2022-01-01 RX ADMIN — HUMAN ALBUMIN MICROSPHERES AND PERFLUTREN 0.66 MG: 10; .22 INJECTION, SOLUTION INTRAVENOUS at 11:11

## 2022-01-01 RX ADMIN — POTASSIUM CHLORIDE 20 MEQ: 200 INJECTION, SOLUTION INTRAVENOUS at 06:11

## 2022-01-01 RX ADMIN — POTASSIUM CHLORIDE 20 MEQ: 200 INJECTION, SOLUTION INTRAVENOUS at 08:11

## 2022-01-01 RX ADMIN — INSULIN ASPART 10 UNITS: 100 INJECTION, SOLUTION INTRAVENOUS; SUBCUTANEOUS at 09:11

## 2022-01-01 RX ADMIN — ACETAMINOPHEN 650 MG: 325 TABLET ORAL at 02:11

## 2022-01-01 RX ADMIN — POTASSIUM CHLORIDE 40 MEQ: 200 INJECTION, SOLUTION INTRAVENOUS at 06:11

## 2022-01-01 RX ADMIN — INSULIN ASPART 6 UNITS: 100 INJECTION, SOLUTION INTRAVENOUS; SUBCUTANEOUS at 11:11

## 2022-01-01 RX ADMIN — GADOBUTROL 10 ML: 604.72 INJECTION INTRAVENOUS at 11:11

## 2022-01-01 RX ADMIN — OXACILLIN SODIUM 12 G: 10 INJECTION, POWDER, FOR SOLUTION INTRAVENOUS at 10:11

## 2022-01-01 RX ADMIN — PIPERACILLIN SODIUM AND TAZOBACTAM SODIUM 4.5 G: 4; .5 INJECTION, POWDER, LYOPHILIZED, FOR SOLUTION INTRAVENOUS at 05:11

## 2022-01-01 RX ADMIN — HEPARIN SODIUM 5000 UNITS: 5000 INJECTION INTRAVENOUS; SUBCUTANEOUS at 06:11

## 2022-01-01 RX ADMIN — INSULIN ASPART 3 UNITS: 100 INJECTION, SOLUTION INTRAVENOUS; SUBCUTANEOUS at 08:11

## 2022-01-01 RX ADMIN — MUPIROCIN: 20 OINTMENT TOPICAL at 09:11

## 2022-01-01 RX ADMIN — AMIODARONE HYDROCHLORIDE 150 MG: 1.5 INJECTION, SOLUTION INTRAVENOUS at 12:11

## 2022-01-01 RX ADMIN — SODIUM PHOSPHATE, MONOBASIC, MONOHYDRATE 15 MMOL: 276; 142 INJECTION, SOLUTION INTRAVENOUS at 12:11

## 2022-01-01 RX ADMIN — ACETAMINOPHEN 650 MG: 325 TABLET ORAL at 04:11

## 2022-01-01 RX ADMIN — POTASSIUM CHLORIDE 20 MEQ: 200 INJECTION, SOLUTION INTRAVENOUS at 01:11

## 2022-01-01 RX ADMIN — LABETALOL HYDROCHLORIDE 10 MG: 5 INJECTION, SOLUTION INTRAVENOUS at 04:11

## 2022-01-01 RX ADMIN — FAMOTIDINE 20 MG: 20 TABLET ORAL at 09:11

## 2022-01-01 RX ADMIN — ACETAMINOPHEN 650 MG: 325 TABLET ORAL at 05:11

## 2022-01-01 RX ADMIN — IPRATROPIUM BROMIDE AND ALBUTEROL SULFATE 3 ML: 2.5; .5 SOLUTION RESPIRATORY (INHALATION) at 11:11

## 2022-01-01 RX ADMIN — Medication 0.5 MG/HR: at 06:11

## 2022-01-01 RX ADMIN — INSULIN ASPART 4 UNITS: 100 INJECTION, SOLUTION INTRAVENOUS; SUBCUTANEOUS at 07:11

## 2022-01-01 RX ADMIN — LEVETIRACETAM 1000 MG: 100 INJECTION, SOLUTION INTRAVENOUS at 05:11

## 2022-01-01 RX ADMIN — LORAZEPAM 2 MG: 2 INJECTION INTRAMUSCULAR at 05:11

## 2022-01-01 RX ADMIN — POTASSIUM CHLORIDE 20 MEQ: 200 INJECTION, SOLUTION INTRAVENOUS at 10:11

## 2022-01-01 RX ADMIN — INSULIN ASPART 6 UNITS: 100 INJECTION, SOLUTION INTRAVENOUS; SUBCUTANEOUS at 06:11

## 2022-01-01 RX ADMIN — AMIODARONE HYDROCHLORIDE 0.5 MG/MIN: 1.8 INJECTION, SOLUTION INTRAVENOUS at 05:11

## 2022-01-01 RX ADMIN — POTASSIUM CHLORIDE 20 MEQ: 200 INJECTION, SOLUTION INTRAVENOUS at 09:11

## 2022-01-01 RX ADMIN — POTASSIUM CHLORIDE 10 MEQ: 7.46 INJECTION, SOLUTION INTRAVENOUS at 09:11

## 2022-01-01 RX ADMIN — ACETAZOLAMIDE 500 MG: 500 INJECTION, POWDER, LYOPHILIZED, FOR SOLUTION INTRAVENOUS at 12:11

## 2022-01-01 RX ADMIN — ACETAMINOPHEN 1000 MG: 10 INJECTION INTRAVENOUS at 02:11

## 2022-01-01 RX ADMIN — POTASSIUM & SODIUM PHOSPHATES POWDER PACK 280-160-250 MG 2 PACKET: 280-160-250 PACK at 02:11

## 2022-01-01 RX ADMIN — INSULIN ASPART 6 UNITS: 100 INJECTION, SOLUTION INTRAVENOUS; SUBCUTANEOUS at 03:11

## 2022-01-01 RX ADMIN — INSULIN ASPART 4 UNITS: 100 INJECTION, SOLUTION INTRAVENOUS; SUBCUTANEOUS at 04:11

## 2022-01-01 RX ADMIN — ACETAMINOPHEN 650 MG: 650 SOLUTION ORAL at 11:11

## 2022-01-01 RX ADMIN — POTASSIUM CHLORIDE 20 MEQ: 200 INJECTION, SOLUTION INTRAVENOUS at 03:11

## 2022-01-01 RX ADMIN — POTASSIUM & SODIUM PHOSPHATES POWDER PACK 280-160-250 MG 2 PACKET: 280-160-250 PACK at 09:11

## 2022-01-01 RX ADMIN — AMIODARONE HYDROCHLORIDE 0.5 MG/MIN: 1.8 INJECTION, SOLUTION INTRAVENOUS at 04:11

## 2022-01-01 RX ADMIN — LEVETIRACETAM 500 MG: 100 INJECTION, SOLUTION INTRAVENOUS at 08:11

## 2022-01-01 RX ADMIN — IPRATROPIUM BROMIDE AND ALBUTEROL SULFATE 3 ML: 2.5; .5 SOLUTION RESPIRATORY (INHALATION) at 05:11

## 2022-01-01 RX ADMIN — FENTANYL CITRATE 25 MCG: 50 INJECTION INTRAMUSCULAR; INTRAVENOUS at 12:11

## 2022-01-01 RX ADMIN — ACETAZOLAMIDE 500 MG: 500 INJECTION, POWDER, LYOPHILIZED, FOR SOLUTION INTRAVENOUS at 08:11

## 2022-01-01 RX ADMIN — INSULIN ASPART 6 UNITS: 100 INJECTION, SOLUTION INTRAVENOUS; SUBCUTANEOUS at 09:11

## 2022-01-01 RX ADMIN — VANCOMYCIN HYDROCHLORIDE 1000 MG: 1 INJECTION, POWDER, LYOPHILIZED, FOR SOLUTION INTRAVENOUS at 03:11

## 2022-01-01 RX ADMIN — POTASSIUM CHLORIDE 40 MEQ: 400 INJECTION, SOLUTION INTRAVENOUS at 11:11

## 2022-01-01 RX ADMIN — INSULIN ASPART 8 UNITS: 100 INJECTION, SOLUTION INTRAVENOUS; SUBCUTANEOUS at 05:11

## 2022-01-01 RX ADMIN — POTASSIUM CHLORIDE 40 MEQ: 400 INJECTION, SOLUTION INTRAVENOUS at 09:11

## 2022-01-01 RX ADMIN — INSULIN ASPART 2 UNITS: 100 INJECTION, SOLUTION INTRAVENOUS; SUBCUTANEOUS at 08:11

## 2022-01-01 RX ADMIN — INSULIN ASPART 2 UNITS: 100 INJECTION, SOLUTION INTRAVENOUS; SUBCUTANEOUS at 12:11

## 2022-01-01 RX ADMIN — PIPERACILLIN SODIUM AND TAZOBACTAM SODIUM 4.5 G: 4; .5 INJECTION, POWDER, LYOPHILIZED, FOR SOLUTION INTRAVENOUS at 01:11

## 2022-01-01 RX ADMIN — AMIODARONE HYDROCHLORIDE 1 MG/MIN: 1.8 INJECTION, SOLUTION INTRAVENOUS at 12:11

## 2022-01-01 RX ADMIN — INSULIN ASPART 8 UNITS: 100 INJECTION, SOLUTION INTRAVENOUS; SUBCUTANEOUS at 04:11

## 2022-01-01 RX ADMIN — POTASSIUM & SODIUM PHOSPHATES POWDER PACK 280-160-250 MG 2 PACKET: 280-160-250 PACK at 06:11

## 2022-01-01 RX ADMIN — SODIUM PHOSPHATE, MONOBASIC, MONOHYDRATE 15 MMOL: 276; 142 INJECTION, SOLUTION INTRAVENOUS at 11:11

## 2022-01-01 RX ADMIN — INSULIN ASPART 2 UNITS: 100 INJECTION, SOLUTION INTRAVENOUS; SUBCUTANEOUS at 06:11

## 2022-01-01 RX ADMIN — ACETAMINOPHEN 650 MG: 325 TABLET ORAL at 12:11

## 2022-01-01 RX ADMIN — SODIUM CHLORIDE: 0.45 INJECTION, SOLUTION INTRAVENOUS at 12:11

## 2022-01-01 RX ADMIN — INSULIN ASPART 4 UNITS: 100 INJECTION, SOLUTION INTRAVENOUS; SUBCUTANEOUS at 11:11

## 2022-01-01 RX ADMIN — POTASSIUM CHLORIDE 10 MEQ: 10 INJECTION, SOLUTION INTRAVENOUS at 12:11

## 2022-01-01 RX ADMIN — ACETAMINOPHEN 650 MG: 325 TABLET ORAL at 08:11

## 2022-01-01 RX ADMIN — POTASSIUM BICARBONATE 35 MEQ: 391 TABLET, EFFERVESCENT ORAL at 11:11

## 2022-01-01 RX ADMIN — SCOPALAMINE 1 PATCH: 1 PATCH, EXTENDED RELEASE TRANSDERMAL at 09:11

## 2022-01-01 RX ADMIN — FUROSEMIDE 80 MG: 10 INJECTION, SOLUTION INTRAMUSCULAR; INTRAVENOUS at 08:11

## 2022-11-15 PROBLEM — I95.9 HYPOTENSION: Status: ACTIVE | Noted: 2022-01-01

## 2022-11-15 PROBLEM — I50.9 CONGESTIVE HEART FAILURE: Status: ACTIVE | Noted: 2022-01-01

## 2022-11-15 PROBLEM — R56.9 SEIZURE: Status: ACTIVE | Noted: 2022-01-01

## 2022-11-15 PROBLEM — I60.9 SAH (SUBARACHNOID HEMORRHAGE): Status: ACTIVE | Noted: 2022-01-01

## 2022-11-15 PROBLEM — I50.40 COMBINED SYSTOLIC AND DIASTOLIC CONGESTIVE HEART FAILURE: Status: ACTIVE | Noted: 2022-01-01

## 2022-11-15 PROBLEM — J96.01 ACUTE RESPIRATORY FAILURE WITH HYPOXIA AND HYPERCAPNIA: Status: ACTIVE | Noted: 2022-01-01

## 2022-11-15 PROBLEM — I60.9 SUBARACHNOID BLEED: Status: ACTIVE | Noted: 2022-01-01

## 2022-11-15 PROBLEM — N17.9 AKI (ACUTE KIDNEY INJURY): Status: ACTIVE | Noted: 2022-01-01

## 2022-11-15 PROBLEM — I48.91 ATRIAL FIBRILLATION: Status: ACTIVE | Noted: 2022-01-01

## 2022-11-15 PROBLEM — E87.3 METABOLIC ALKALOSIS: Status: ACTIVE | Noted: 2022-01-01

## 2022-11-15 PROBLEM — R79.89 ELEVATED TROPONIN: Status: ACTIVE | Noted: 2022-01-01

## 2022-11-15 PROBLEM — R78.81 BACTEREMIA: Status: ACTIVE | Noted: 2022-01-01

## 2022-11-15 PROBLEM — J96.02 ACUTE RESPIRATORY FAILURE WITH HYPOXIA AND HYPERCAPNIA: Status: ACTIVE | Noted: 2022-01-01

## 2022-11-15 PROBLEM — R40.2430 GLASGOW COMA SCALE TOTAL SCORE 3-8: Status: ACTIVE | Noted: 2022-01-01

## 2022-11-15 NOTE — NURSING
Contacted provider SANDRA Park regarding pt's elevated HR of 127. Lopressor 2.5 mg order received.

## 2022-11-15 NOTE — ASSESSMENT & PLAN NOTE
Bicarb > 40, concern for contraction alkalosis in setting of aggressive diuressis  Lasix held  Diamox in AM

## 2022-11-15 NOTE — CONSULTS
[facility] Critical [facility]  Vascular Neurology  [facility]  Consult Note    Inpatient consult to Vascular (Stroke) Neurology  Consult performed by: Edwige Dior NP  Consult ordered by: Crow Bender NP    Inpatient consult to Vascular (Stroke) Neurology  Consult performed by: Edwige Dior NP  Consult ordered by: Crow Bender NP        Assessment/Plan:     Patient is a 67 y.o. year old male with:    * SAH (subarachnoid hemorrhage)  68 y/o male with Rambo SAH non traumatic could be due to supertherapeutic INR of 4     Antithrombotics: None SAH    Statins: None SAH    Aggressive risk factor modification: HTN, DM, Diet, Exercise, Obesity, A-Fib, CAD     Rehab efforts: The patient has been evaluated by a stroke team provider and the therapy needs have been fully considered based off the presenting complaints and exam findings. The following therapy evaluations are needed: PT evaluate and treat, OT evaluate and treat, SLP evaluate and treat, PM&R evaluate for appropriate placement, when appropriate    Diagnostics ordered/pending: MRA head to assess vasculature, MRA neck/arch to assess vasculature, MRI head without contrast to assess brain parenchyma, TTE to assess cardiac function/status     VTE prophylaxis: Mechanical prophylaxis: Place SCDs  None: Reason for No Pharmacological VTE Prophylaxis: History of systemic or intracranial bleeding    BP parameters: SAH: SBP <140        Atrial fibrillation  Rate control  Will need anticoagulation when safe to give    Bacteremia  ABX per NCC    ROSEMARIE (acute kidney injury)  Avoid nephrotoxins  Renal dose meds    Seizure  EEG continuous   AED's    Combined systolic and diastolic congestive heart failure  2D Echo  Continue entresto and lasix when able        STROKE DOCUMENTATION          NIH Scale:  Reason Unable to Complete: Unable to perform sedation vacation - status epilepticus or ICP uncontrolled  1a. Level of Consciousness: 3-->Responds only  with reflex motor or autonomic effects or totally unresponsive, flaccid, and areflexic  1b. LOC Questions: 2-->Answers neither question correctly  1c. LOC Commands: 2-->Performs neither task correctly  2. Best Gaze: 0-->Normal  3. Visual: 0-->No visual loss  4. Facial Palsy: 0-->Normal symmetrical movements  5a. Motor Arm, Left: 4-->No movement  5b. Motor Arm, Right: 4-->No movement  6a. Motor Leg, Left: 4-->No movement  6b. Motor Leg, Right: 4-->No movement  7. Limb Ataxia: 0-->Absent  8. Sensory: 0-->Normal, no sensory loss  9. Best Language: 3-->Mute, global aphasia, no usable speech or auditory comprehension  10. Dysarthria: (UN) Intubated or other physical barrier  11. Extinction and Inattention (formerly Neglect): 0-->No abnormality  Total (NIH Stroke Scale): 26    Modified [place]    [place] Coma Scale:6   ABCD2 Score:    [id number] Score:7  HAS -BLED Score:   ICH Score:   Hunt & Feng Classification:Grade V      Thrombolysis Candidate? No, CT findings (ICH, SAH, Large core infarct)     Delays to Thrombolysis?  Not Applicable    Interventional Revascularization Candidate?   Is the patient eligible for mechanical endovascular reperfusion (RAE)?  NO SAH    Delays to Thrombectomy? Not Applicable    Hemorrhagic change of an Ischemic Stroke: Does this patient have an ischemic stroke with hemorrhagic changes? No     Subjective:     History of Present Illness:  66 yo male with known PMH of Afib who is transferred to neuro ICU critically ill after prolonged hospital stay at [facility] patient started with seizures and was intubated to protect airway and was started on AED's . CT head 11-10-22 revealed no acute process seen. But CT scan 11-14-22  concern for nicholas SAH R.L in the setting of supratherapeutic INR.  Mr. Burnett per report had an INR of 4 at OSH and had mental decline prompting intubation.  He has had metabolic alkalosis and hypotension and is now on levo and EEG.  Given Kcentra, DDAVP  and Vit K at OSH For Coumadin and ASA use.       Patient has Pacemaker per chart review that was placed on 2018 at [facility] by Dr Enzo Marx MD but unable to see manufacture or model number.           Past Medical History:   Diagnosis Date    CAD (coronary artery disease)     CHF (congestive heart failure)     Chronic a-fib     CKD (chronic kidney disease) stage 3, GFR 30-59 ml/min     Diabetes mellitus type 2 in obese     HTN (hypertension), benign     Hypothyroid     Obesity (BMI 30-39.9)     Seizures     Venous stasis      Past Surgical History:   Procedure Laterality Date    CORONARY ARTERY BYPASS GRAFT      2 vessel    INSERTION OF PACEMAKER  2018    RENAL BIOPSY      TONSILLECTOMY       No family history on file.  Social History     Tobacco Use    Smoking status: Former     Types: Cigarettes     Quit date:      Years since quittin.8    Smokeless tobacco: Never   Substance Use Topics    Alcohol use: Not Currently    Drug use: Never     Review of patient's allergies indicates:  Not on File    Medications: I have reviewed the current medication administration record.    Medications Prior to Admission   Medication Sig Dispense Refill Last Dose    amiodarone (PACERONE) 200 MG Tab Take 200 mg by mouth.       empagliflozin (JARDIANCE) 10 mg tablet Take 10 mg by mouth.       aspirin (ECOTRIN) 81 MG EC tablet Take 81 mg by mouth once daily.       furosemide (LASIX) 80 MG tablet Take 80 mg by mouth every morning.       metoprolol succinate (TOPROL-XL) 100 MG 24 hr tablet Take 100 mg by mouth once daily.       pravastatin (PRAVACHOL) 40 MG tablet Take 40 mg by mouth once daily.       sacubitriL-valsartan (ENTRESTO) 49-51 mg per tablet Entresto 49 mg-51 mg tablet       warfarin (COUMADIN) 5 MG tablet Take 5 mg by mouth.          Review of Systems   Unable to perform ROS: Intubated   Objective:     Vital Signs (Most Recent):  Temp: 99 °F (37.2 °C) (22  2305)  Pulse: 74 (11/15/22 0314)  Resp: 15 (11/15/22 0314)  BP: 102/62 (11/14/22 2305)  SpO2: 99 % (11/15/22 0314)    Vital Signs Range (Last 24H):  Temp:  [99 °F (37.2 °C)]   Pulse:  [74-77]   Resp:  [15-18]   BP: (102)/(62)   SpO2:  [94 %-99 %]     Physical Exam  Vitals and nursing note reviewed.   Constitutional:       Appearance: He is obese. He is ill-appearing.      Comments: comatose   HENT:      Head: Normocephalic and atraumatic.   Eyes:      Pupils: Pupils are equal, round, and reactive to light.   Cardiovascular:      Rate and Rhythm: Tachycardia present.   Pulmonary:      Comments: Intubated on vent coarse breath sounds  Abdominal:      General: Abdomen is flat. Bowel sounds are normal.      Palpations: Abdomen is soft.   Musculoskeletal:      Cervical back: Normal range of motion.      Right lower leg: Edema present.      Left lower leg: Edema present.   Skin:     Findings: Bruising and rash present.      Comments: Multiple skin tears and wounds all over    Neurological:      Comments: Comatose on vent, min movment hands to deep pain, flexion to Rambo LE       Neurological Exam:   LOC: comatose  Attention Span: comatose  Language: Intubated  Articulation: Untestable due to intubation  Orientation: Person, Place, Time , Untestable due to intubation  Visual Fields: Full  EOM (CN III, IV, VI): Full/intact  Pupils (CN II, III): PERRL  Facial Sensation (CN V): Corneal reflex present Bilateral  Facial Movement (CN VII): Unable to test   Gag Reflex: present  Reflexes: flexor plantar responses bilaterally  Motor: min hand movement to deep pain, flexion to Rambo LE  Cerebellum: No evidence of appendicular or axial ataxia  Sensation: Intact to light touch, temperature and vibration  Tone: Flaccid  LUE , LLE, RUE, and RLE       Laboratory:  CMP:   Recent Labs   Lab 11/15/22  0255   CALCIUM 8.6*   ALBUMIN 2.1*   PROT 6.9   *   K 2.4*   CO2 42*   CL 90*   BUN 68*   CREATININE 1.8*   ALKPHOS 96   ALT 26   AST 74*    BILITOT 3.3*     CBC:   Recent Labs   Lab 11/15/22  0255   WBC 9.33   RBC 4.56*   HGB 13.7*   HCT 45.5      *   MCH 30.0   MCHC 30.1*     Lipid Panel:   Recent Labs   Lab 11/15/22  0004   CHOL 91*   LDLCALC 36.8*   HDL 14*   TRIG 201*     Coagulation:   Recent Labs   Lab 11/15/22  0255   INR 1.3*   APTT 26.1     Hgb A1C:   Recent Labs   Lab 11/15/22  0004   HGBA1C 6.8*     TSH:   Recent Labs   Lab 11/15/22  0004   TSH 3.919       Diagnostic Results:      Brain imaging:  Per [facility]     Vessel Imaging:  CTA Head per [facility]     Cardiac Evaluation:           Edwige Dior NP  [facility]  Department of Vascular Neurology   [facility]

## 2022-11-15 NOTE — NURSING
Wounds upon Assessment on 11/15 am. Wounds are labeled on dressings as the followin: L heel ulcer   2: L ankle ulcer   3: L calf ulcer with serosangiuneous drainage  4: Interior shin scabbing  5: L heel (nonblanchable reddness)  6: Maroon to R ankle (ASI)   7: Abdomen (yellow brown drainage)   8: Skin tear R forearm   9: L ear necrosis   10: R neck ASI      Wound care has been consulted and has assessed the patient.  They applied an anti-fungal and wrapping to the R calf.      WCTM.

## 2022-11-15 NOTE — ASSESSMENT & PLAN NOTE
unable to view any documentation from OSH, it was mentioned that EF was 20-25%, and diastolic function was also decreased  Repeating echo, as no images was sent  Levo as vasopressor to help with contractility  EKG pending  -It did appear patient may have been on Entresto at some point, so possible acute on chronic failure    BNP pending to eval trend  .

## 2022-11-15 NOTE — ASSESSMENT & PLAN NOTE
Blood Culture pending  Zosyn and Vanc ordered  Vanc level considering previously on vanc and ROSEMARIE

## 2022-11-15 NOTE — PLAN OF CARE
[facility] Neuro Critical [facility]  Initial Discharge Assessment       Primary Care Provider: Mark Rocha MD    Admission Diagnosis: Subarachnoid bleed [I60.9]    Admission Date: 11/14/2022  Expected Discharge Date: 11/23/2022    Discharge Barriers Identified: None    Payor: [employer] / Plan: [employer] MEDICARE HMO / Product Type: Medicare Advantage /     Extended Emergency Contact Information  Primary Emergency Contact: Irma Burnett  Address: [address] [place] [address], [place] [address] [facility]  Home Phone: [phone]  Mobile Phone: [phone]  Relation: Spouse  Secondary Emergency Contact: Panfilo Burnett  Mobile Phone: [phone]  Relation: Son    Discharge Plan A: Rehab  Discharge Plan B: Skilled Nursing Facility      [facility] [place], [place] - [address]  [address] [place] [address]  Phone: [phone] Fax: [phone]      Transferred from:     Past Medical History:   Diagnosis Date    CAD (coronary artery disease)     CHF (congestive heart failure)     Chronic a-fib     CKD (chronic kidney disease) stage 3, GFR 30-59 ml/min     Diabetes mellitus type 2 in obese     HTN (hypertension), benign     Hypothyroid     Obesity (BMI 30-39.9)     Seizures     Venous stasis          CM met with patient in room for Discharge Planning Assessment.  Patient is intubated and unable to answer questions.  Phone call to Irma Burnett (wife) [phone].  Per wife, the patient lives with wife in a single story house with 3 step(s) to enter.   Per wife, the patientwas independent with ADLS and used no dme for ambulation up until a few weeks ago.  Per wife, the patient declined over the past few weeks and was using a borrowed walker, but had to turn is sideways to fit down the hallway.  Wife stated that patient sometimes needed assistance with bathing and dressing.  Patient will have assistance from his wife upon discharge.   Discharge Planning Booklet given to  patient/family and discussed.  All questions addressed.  CM will follow for needs.       Initial Assessment (most recent)       Adult Discharge Assessment - 11/15/22 1308          Discharge Assessment    Assessment Type Discharge Planning Assessment     Confirmed/corrected address, phone number and insurance Yes     Confirmed Demographics Correct on Facesheet     Source of Information unable to respond     If unable to respond/provide information was family/caregiver contacted? Yes     Contact Name/Number Irma Burnett (wife) [phone]     Communicated SHANIQUA with patient/caregiver Date not available/Unable to determine     Reason For Admission SAH     Lives With spouse     Facility Arrived From: [facility]     Do you expect to return to your current living situation? Yes     Do you have help at home or someone to help you manage your care at home? Yes     Who are your caregiver(s) and their phone number(s)? Imra Burnett (wife) [phone]     Prior to hospitilization cognitive status: Alert/Oriented     Current cognitive status: Unable to Assess;Coma/Sedated/Intubated     Walking or Climbing Stairs Difficulty ambulation difficulty, assistance 1 person     Mobility Management Per wife, patient borrowed a walker and wife assisted for the past 2 weeks     Dressing/Bathing Difficulty bathing difficulty, assistance 1 person;dressing difficulty, assistance 1 person     Dressing/Bathing Management prn- wife     Home Accessibility stairs to enter home     Number of Stairs, Main Entrance three     Home Layout Able to live on 1st floor     Equipment Currently Used at Home other (see comments)   borrowed walker    Readmission within 30 days? No     Patient currently being followed by outpatient case management? No     Do you currently have service(s) that help you manage your care at home? No     Do you take prescription medications? Yes     Do you have prescription coverage? Yes     Coverage [employer]     Is the  patient taking medications as prescribed? yes     Who is going to help you get home at discharge? Irma Burnett (wife) [phone]     How do you get to doctors appointments? family or friend will provide     Are you on dialysis? No     Do you take coumadin? No     Discharge Plan A Rehab     Discharge Plan B Skilled Nursing Facility     DME Needed Upon Discharge  other (see comments)   tbd    Discharge Plan discussed with: Spouse/sig other     Name(s) and Number(s) Irma Burnett (wife) [phone]     Discharge Barriers Identified None        Relationship/Environment    Name(s) of Who Lives With Patient Irma Burnett (wife) [phone]                   Danika Causey, RN, CCRN-K, [facility]  Neuro-Critical Care   [phone] [id number]

## 2022-11-15 NOTE — HPI
Enrico Burnett is a 68 yo male with known PMH of Afib who is transferred to neuro ICU critically ill after prolonged hospital stay at outside facility with concern for multiple parenchymal ICH in the setting of supratherapeutic INR.  Mr. Burnett per report had an INR of 4 at OSH and had mental decline prompting intubation.  He has had metabolic alkalosis and hypotension and is now on levo and EEG.  NSGY consulted due to small diffuse R>L parietal and frontal multifocal hemorrhages. Given Kcentra, DDAVP and Vit K at OSH For Coumadin and ASA use.

## 2022-11-15 NOTE — NURSING
Pt wife gave medical device ID card with ICD information.      [employer]   - ICD: Model # D022; Serial # [id number]  - Lead: Model # 0296; Serial # [id number]  - Lead: Model # 7741; Serial # [id number]       · Statin  · If Eliquis to be restarted at d/c, unsure of benefit of ASA

## 2022-11-15 NOTE — PLAN OF CARE
Recommendations     1. When medically able, initiate TF regimen of Impact Peptide 1.5 @ goal rate of 55 ml/hr- provides 1980 kcal, 124 g PRO, 1016 mL free water.      2. RD following.     Goals: Will meet % EEN/EPN by next RD f/u.  Nutrition Goal Status: new  Communication of RD Recs: (POC)    Lakeisha Resendiz - [facility] Dietetic Intern

## 2022-11-15 NOTE — HPI
68 yo male with known PMH of Afib who is transferred to neuro ICU critically ill after prolonged hospital stay at [facility] patient started with seizures and was intubated to protect airway and was started on AED's . CT head 11-10-22 revealed no acute process seen. But CT scan 11-14-22  concern for nicholas SAH R.L in the setting of supratherapeutic INR.  Mr. Burnett per report had an INR of 4 at OSH and had mental decline prompting intubation.  He has had metabolic alkalosis and hypotension and is now on levo and EEG.  Given Kcentra, DDAVP and Vit K at OSH For Coumadin and ASA use.       Patient has Pacemaker per chart review that was placed on 2- at [facility] by Dr Enzo Marx MD but unable to see manufacture or model number.

## 2022-11-15 NOTE — CONSULTS
[facility] - Neuro Critical Care  Adult Nutrition  Consult Note    SUMMARY     Recommendations    1. When medically able, initiate TF regimen of Impact Peptide 1.5 @ goal rate of 55 ml/hr- provides 1980 kcal, 124 g PRO, 1016 mL free water.     2. RD following.    Goals: Will meet % EEN/EPN by next RD f/u.  Nutrition Goal Status: new  Communication of RD Recs:  ((POC))    Assessment and Plan    Nutrition Problem  Inadequate energy intake     Related to (etiology):   Inability to consume sufficient needs    Signs and Symptoms (as evidenced by):   NPO status      Interventions(treatment strategy):  Collaboration of nutrition care with other providers  EN    Nutrition Diagnosis Status:   New     Reason for Assessment    Reason For Assessment: consult  Diagnosis: stroke/CVA  Relevant Medical History: Chronic a-fib, CHF, seizures, Hypothyroid, HTN, DM2, CAD, CKD3, Obesity, Venous stasis  Interdisciplinary Rounds: did not attend  General Information Comments: RD consulted to assess dietary needs. Unable to speak with pt 2/2 being intubated/on vent. No wt hx per chart review. Unable to complete NFPE at this time 2/2 to head being wrapped and body being covered- will perform at next RD f/u or when pt more appropriate. RD to monitor and follow.  Nutrition Discharge Planning: Pending hospital course    Nutrition Risk Screen    Nutrition Risk Screen: dysphagia or difficulty swallowing    Nutrition/Diet History    Spiritual, Cultural Beliefs, Faith Practices, Values that Affect Care: no  Food Allergies: NKFA  Factors Affecting Nutritional Intake: NPO, on mechanical ventilation, difficulty/impaired swallowing    Anthropometrics    Temp: 97.6 °F (36.4 °C)  Height: 6' (182.9 cm)  Height (inches): 72 in  Weight Method: Bed Scale  Weight: 100 kg (220 lb 7.4 oz)  Weight (lb): 220.46 lb  Ideal Body Weight (IBW), Male: 178 lb  % Ideal Body Weight, Male (lb): 123.85 %  BMI (Calculated): 29.9  BMI Grade: 25 - 29.9 - overweight      Lab/Procedures/Meds    Pertinent Labs Reviewed: reviewed  Pertinent Labs Comments: Glucose 180, Hgba1C 6.8, Albumin 2.1, BUN 68, Bilrubin 3.3, Sodium 147, Potassium 2.4, Ca 8.6, Chloride 90, Creatinine 1.8, Phos 2.6, eGFR 41.3. Chol 91  Pertinent Medications Reviewed: reviewed  Pertinent Medications Comments: Famotidine, Mupirocin, Penicillin, Potassium Chloride, Senna-docusate, Glucagon, Ondansetron, Sodium Chloride    Estimated/Assessed Needs    Weight Used For Calorie Calculations: 100 kg (220 lb 7.4 oz)  Energy Calorie Requirements (kcal): 2011  Energy Need Method: [facility]  Protein Requirements: 120 - 150 gm  Weight Used For Protein Calculations: 100 kg (220 lb 7.4 oz)  Fluid Requirements (mL): 1 ml or fluid per MD  Estimated Fluid Requirement Method: RDA Method  RDA Method (mL): 2011  CHO Requirement: 250    Nutrition Prescription Ordered    Current Diet Order: NPO    Evaluation of Received Nutrient/Fluid Intake    I/O: -848 mL  Comments: LBM: 11/15    Nutrition Risk    Level of Risk/Frequency of Follow-up:  (1 time/week)     Monitor and Evaluation    Food and Nutrient Intake: energy intake  Food and Nutrient Adminstration: enteral and parenteral nutrition administration, diet order  Knowledge/Beliefs/Attitudes: beliefs and attitudes, food and nutrition knowledge/skill  Physical Activity and Function: nutrition-related ADLs and IADLs  Anthropometric Measurements: weight, weight change, body mass index  Biochemical Data, Medical Tests and Procedures: electrolyte and renal panel, gastrointestinal profile, glucose/endocrine profile, inflammatory profile, lipid profile  Nutrition-Focused Physical Findings: overall appearance     Nutrition Follow-Up    RD Follow-up?: Yes    Lakeisha Trinh Dietetic Intern

## 2022-11-15 NOTE — PROGRESS NOTES
11/15/22 1200   WOCN Assessment   WOCN Total Time (mins) 30   Visit Date 11/15/22   Visit Time 1200   Consult Type New   WOCN Speciality Wound   Intervention assessed;changed;applied;chart review;coordination of care;orders   Pressure Injury Prevention    Check Moisture Management Pad Done        Altered Skin Integrity 11/14/22 2305 Left Ear Skin Tear   Date First Assessed/Time First Assessed: 11/14/22 2305   Altered Skin Integrity Present on Admission: yes  Side: Left  Location: Ear  Primary Wound Type: Skin Tear   Wound Image    Dressing Appearance Open to air   Drainage Amount None   Black (%), Wound Tissue Color 2 %   Red (%), Wound Tissue Color 98 %   Wound Length (cm) 8.5 cm   Wound Width (cm) 4.5 cm   Wound Depth (cm) 0.1 cm   Wound Volume (cm^3) 3.825 cm^3   Wound Surface Area (cm^2) 38.25 cm^2        Altered Skin Integrity 11/14/22 2305 Right anterior;lower Leg Venous Ulcer   Date First Assessed/Time First Assessed: 11/14/22 2305   Altered Skin Integrity Present on Admission: yes  Side: Right  Orientation: anterior;lower  Location: Leg  Primary Wound Type: Venous Ulcer   Wound Image    Red (%), Wound Tissue Color 100 %   Wound Length (cm) 23 cm   Wound Width (cm) 17 cm   Wound Depth (cm) 0.1 cm   Wound Volume (cm^3) 39.1 cm^3   Wound Surface Area (cm^2) 391 cm^2        Altered Skin Integrity 11/14/22 2305 Left anterior;lower Leg Venous Ulcer   Date First Assessed/Time First Assessed: 11/14/22 2305   Altered Skin Integrity Present on Admission: yes  Side: Left  Orientation: anterior;lower  Location: Leg  Primary Wound Type: Venous Ulcer   Wound Image    Drainage Amount None   Red (%), Wound Tissue Color 100 %   Wound Edges Undefined   Wound Length (cm) 3 cm   Wound Width (cm) 1 cm   Wound Depth (cm) 0.1 cm   Wound Volume (cm^3) 0.3 cm^3   Wound Surface Area (cm^2) 3 cm^2        Altered Skin Integrity 11/15/22 1200 Sacral spine   Date First Assessed/Time First Assessed: 11/15/22 1200   Location: Sacral  spine   Wound Image    Drainage Amount None   Red (%), Wound Tissue Color 100 %   Wound Length (cm) 15 cm   Wound Width (cm) 17.5 cm   Wound Depth (cm) 0.1 cm   Wound Volume (cm^3) 26.25 cm^3   Wound Surface Area (cm^2) 262.5 cm^2        Altered Skin Integrity 11/15/22 1200 Left lower;lateral Leg   Date First Assessed/Time First Assessed: 11/15/22 1200   Side: Left  Orientation: lower;lateral  Location: Leg   Wound Image    Drainage Amount Scant   Tissue loss description Partial thickness   Red (%), Wound Tissue Color 100 %   Wound Edges Undefined   Wound Length (cm) 3 cm   Wound Width (cm) 2 cm   Wound Depth (cm) 0.1 cm   Wound Volume (cm^3) 0.6 cm^3   Wound Surface Area (cm^2) 6 cm^2        Altered Skin Integrity 11/15/22 1200 Left Malleolus   Date First Assessed/Time First Assessed: 11/15/22 1200   Side: Left  Location: Malleolus   Wound Image    Drainage Amount Scant   Red (%), Wound Tissue Color 100 %   Wound Edges Undefined   Wound Length (cm) 2 cm   Wound Width (cm) 2 cm   Wound Depth (cm) 0.1 cm   Wound Volume (cm^3) 0.4 cm^3   Wound Surface Area (cm^2) 4 cm^2        Altered Skin Integrity 11/15/22 1200 Left Heel   Date First Assessed/Time First Assessed: 11/15/22 1200   Side: Left  Location: Heel   Wound Image    Drainage Amount None   Red (%), Wound Tissue Color 100 %   Wound Length (cm) 5 cm   Wound Width (cm) 12 cm   Wound Depth (cm) 0.1 cm   Wound Volume (cm^3) 6 cm^3   Wound Surface Area (cm^2) 60 cm^2        Altered Skin Integrity 11/15/22 1200 Right medial Malleolus   Date First Assessed/Time First Assessed: 11/15/22 1200   Side: Right  Orientation: medial  Location: Malleolus   Wound Image    Dressing Appearance Dry;Intact;Clean   Drainage Amount None   Red (%), Wound Tissue Color 100 %   Wound Edges Undefined   Wound Length (cm) 4 cm   Wound Width (cm) 4 cm   Wound Depth (cm) 0.1 cm   Wound Volume (cm^3) 1.6 cm^3   Wound Surface Area (cm^2) 16 cm^2   [facility]  Wound  Care    Patient Name:  Enrico Burnett   MRN:  [id number]  Date: 11/15/2022  Diagnosis: SAH (subarachnoid hemorrhage)    History:     Past Medical History:   Diagnosis Date    CAD (coronary artery disease)     CHF (congestive heart failure)     Chronic a-fib     CKD (chronic kidney disease) stage 3, GFR 30-59 ml/min     Diabetes mellitus type 2 in obese     HTN (hypertension), benign     Hypothyroid     Obesity (BMI 30-39.9)     Seizures     Venous stasis        Social History     Socioeconomic History    Marital status: Unknown   Tobacco Use    Smoking status: Former     Types: Cigarettes     Quit date:      Years since quittin.8    Smokeless tobacco: Never   Substance and Sexual Activity    Alcohol use: Not Currently    Drug use: Never       Precautions:     Allergies as of 2022    (Not on File)       [facility] Assessment Details/Treatment   Patient consult for wound care with multiple skin tears to lower extremities, and red blanchable sacral area. The patient's left ear have redness, edema and a small dark area. The right leg treatment is to cleanse with normal saline apply antifungal ointment cover anterior right shin with abd pad wrap loosely with cast padding , and loosely secure with a ace bandage.The left ear, leave open to air, make sure no pressure applied to the left ear by lying on it.bi lateral heels apply foam heel border and heel support boots at all times. To skin opening to left leg and right and left ankle apply mepilex border. Apply a foam sacral border to sacral area. All dressings and borders are change every three days and prn for saturation. Patient lying on a low air loss mattress.     Recommendations made to primary team for the above Orders placed.     11/15/2022

## 2022-11-15 NOTE — PROCEDURES
Electrode cap EEG prelim  23:01 p.m.-23:44 p.m.    Background:  Continuous, relatively symmetric, mixed frequency theta/delta activity.  Some poorly formed sleep architecture.    Impression:  Within the limitations of an electrode cap EEG, encephalopathy (likely in the moderate to moderate-severe range) which is a nonspecific finding with regards to etiology.  No pushbutton activations, no epileptiform features, no electrographic seizures.    Please hit the EEG button with any clinical activity concerning for seizures and describe what you see.    Full report after the completion of the study.    Emeli Delarosa MD PhD  Neurology-Epilepsy  [facility].

## 2022-11-15 NOTE — PROGRESS NOTES
Pharmacokinetic Initial Assessment: IV Vancomycin    Assessment/Plan:    - Vanc random remains elevated this AM at 25.5 mcg/mL; down from 31.1 mcg/mL overnight  - Calculated t1/2 ~24 hours  - SCr continues to trend up, will continue to dose by level  - Draw AM random tomorrow 11/16  - Will re-dose once level < 20 mcg/mL    -Pharmacy will continue to follow and monitor vancomycin.    Please contact pharmacy at extension [phone] with any questions regarding this assessment.     Thank you for the consult,   Arlette Gomez, PharmD, [facility]  Neurocritical Care Pharmacist  [phone]         Patient brief summary:  Enrico Burnett is a 67 y.o. male initiated on antimicrobial therapy with IV Vancomycin for treatment of suspected bacteremia    Drug Allergies:   Review of patient's allergies indicates:  Not on File    Actual Body Weight:   100 kg    Renal Function:   Estimated Creatinine Clearance: 50.1 mL/min (A) (based on SCr of 1.8 mg/dL (H)).     Dialysis Method (if applicable):  N/A    CBC (last 72 hours):  Recent Labs   Lab Result Units 11/13/22  0154 11/14/22  0251 11/15/22  0004 11/15/22  0255   WBC K/uL 9.0 8.3  --  9.33   Hemoglobin g/dL 15.0 14.2  --  13.7*   Hemoglobin A1C %  --   --  6.8*  --    Hematocrit %  --   --   --  45.5   Hct % 53.1* 49.9  --   --    Platelets K/uL 109* 131  --  152   Gran % %  --   --   --  86.5*   Lymph % %  --   --   --  4.6*   Mono % %  --   --   --  7.4   Eosinophil % %  --   --   --  0.5   Basophil % %  --   --   --  0.5   Differential Method   --   --   --  Automated       Metabolic Panel (last 72 hours):  Recent Labs   Lab Result Units 11/13/22  0154 11/14/22  0251 11/15/22  0141 11/15/22  0255 11/15/22  1028   Sodium mmol/L 144 145  --  147*  --    Potassium mmol/L  --   --   --  2.4* 2.6*   Chloride mmol/L 93* 92*  --  90*  --    CO2 mmol/L >40* >40*  --  42*  --    Glucose mg/dL 119* 135*  --  180*  --    Glucose, UA   --   --  3+*  --   --    BUN mg/dL  --   --   --  68*  --     Creatinine mg/dL 1.54* 1.61*  --  1.8*  --    Albumin g/dL  --   --   --  2.1*  --    Total Bilirubin mg/dL  --   --   --  3.3*  --    Alkaline Phosphatase U/L  --   --   --  96  --    AST U/L  --   --   --  74*  --    ALT U/L  --   --   --  26  --    Magnesium mg/dL  --   --   --  2.1  --    Magnesium Level mg/dL  --  1.9  --   --   --    Phosphorus mg/dL  --   --   --  2.6*  --    Phosphorus Level mg/dL  --  5.3*  --   --   --        Drug levels (last 3 results):  Recent Labs   Lab Result Units 11/15/22  0026 11/15/22  0748   Vancomycin, Random ug/mL 31.1 25.5       Microbiologic Results:  Microbiology Results (last 7 days)       Procedure Component Value Units Date/Time    Culture, Respiratory with Gram Stain [[id number]] Collected: 11/15/22 0954    Order Status: Completed Specimen: Respiratory from Endotracheal Aspirate Updated: 11/15/22 1446     Gram Stain (Respiratory) <10 epithelial cells per low power field.     Gram Stain (Respiratory) Rare  WBC's     Gram Stain (Respiratory) Rare yeast    Narrative:      Mini-BAL.    Blood culture [[id number]] Collected: 11/15/22 0019    Order Status: Completed Specimen: Blood from Peripheral, Forearm, Left Updated: 11/15/22 0715     Blood Culture, Routine No Growth to date    Narrative:      Blood cultures x 2 different sites. 4 bottles total. Please  draw cultures before administering antibiotics.    Blood culture [[id number]] Collected: 11/15/22 0018    Order Status: Completed Specimen: Blood from Peripheral, Lower Leg, Right Updated: 11/15/22 0715     Blood Culture, Routine No Growth to date    Narrative:      Blood cultures from 2 different sites. 4 bottles total.  Please draw before starting antibiotics.

## 2022-11-15 NOTE — ASSESSMENT & PLAN NOTE
66 y/o male with Rambo SAH non traumatic could be due to supertherapeutic INR of 4     Antithrombotics: None SAH    Statins: None SAH    Aggressive risk factor modification: HTN, DM, Diet, Exercise, Obesity, A-Fib, CAD     Rehab efforts: The patient has been evaluated by a stroke team provider and the therapy needs have been fully considered based off the presenting complaints and exam findings. The following therapy evaluations are needed: PT evaluate and treat, OT evaluate and treat, SLP evaluate and treat, PM&R evaluate for appropriate placement, when appropriate    Diagnostics ordered/pending: MRA head to assess vasculature, MRA neck/arch to assess vasculature, MRI head without contrast to assess brain parenchyma, TTE to assess cardiac function/status     VTE prophylaxis: Mechanical prophylaxis: Place SCDs  None: Reason for No Pharmacological VTE Prophylaxis: History of systemic or intracranial bleeding    BP parameters: SAH: SBP <140

## 2022-11-15 NOTE — SUBJECTIVE & OBJECTIVE
No medications prior to admission.       Review of patient's allergies indicates:  Not on File    No past medical history on file.  No past surgical history on file.  Family History    None       Tobacco Use    Smoking status: Not on file    Smokeless tobacco: Not on file   Substance and Sexual Activity    Alcohol use: Not on file    Drug use: Not on file    Sexual activity: Not on file     Review of Systems   Unable to perform ROS: Intubated   Objective:     Weight: 100 kg (220 lb 7.4 oz)  Body mass index is 29.9 kg/m².  Vital Signs (Most Recent):  Temp: 99 °F (37.2 °C) (11/14/22 2305)  Pulse: 74 (11/14/22 2305)  Resp: 18 (11/14/22 2305)  BP: 102/62 (11/14/22 2305)  SpO2: 97 % (11/14/22 2305) Vital Signs (24h Range):  Temp:  [99 °F (37.2 °C)] 99 °F (37.2 °C)  Pulse:  [74-77] 74  Resp:  [18] 18  SpO2:  [94 %-98 %] 97 %  BP: (102)/(62) 102/62                Vent Mode: A/C  Oxygen Concentration (%):  [50-60] 50  Resp Rate Total:  [18 br/min] 18 br/min  Vt Set:  [460 mL] 460 mL  PEEP/CPAP:  [5 cmH20] 5 cmH20  Mean Airway Pressure:  [9.2 cmH20] 9.2 cmH20         Physical Exam    Neurosurgery Physical Exam  [id number]  Sedated on fent / versed gtt  PERRL  BS intact  W/d BLE L>R   W/d BUE L>R    Intubated  Distal LE rashes possible cellulitis  No distress  Appears chronically ill    Significant Labs:  Recent Labs   Lab 11/14/22 0251      K 2.8*   CL 92*   CO2 >40*   BUN 47*   CREATININE 1.61*   CALCIUM 8.2*     Recent Labs   Lab 11/14/22 0251   WBC 8.3   HGB 14.2   HCT 49.9        Recent Labs   Lab 11/14/22 0251   INR 2.29*     Microbiology Results (last 7 days)       Procedure Component Value Units Date/Time    Blood culture [[id number]] Collected: 11/15/22 0018    Order Status: Sent Specimen: Blood from Peripheral, Lower Leg, Right Updated: 11/15/22 0027    Blood culture [[id number]] Collected: 11/15/22 0019    Order Status: Sent Specimen: Blood from Peripheral, Forearm, Left Updated: 11/15/22 0027    Culture,  Respiratory with Gram Stain [[id number]]     Order Status: No result Specimen: Respiratory           All pertinent labs from the last 24 hours have been reviewed.    Significant Diagnostics:  I have reviewed all pertinent imaging results/findings within the past 24 hours.

## 2022-11-15 NOTE — HPI
68 yo M w/ hx a fib/a flutter (on Warfarin), CHF transferred for eval and management for SAH. No notes sent with patient, unable to get exact events/timeline. It appears he was being treated for CHF, had unsuccessful cardioversion on 11/8 and also staph bacteremia on 11/11. Over the weekend, had seizure acitivy over the weekend requiring intuabtion. CTH showed diffuse SAH, CTA read as no Aneurysm/avm. INR was supratheraputic a few days ago > 4, but had decreased to 2's last few days. No reports of trauma, possible spontaneuous in setting of supratheraputic INR. PCC and Vit K given for elevated INR. DDAVP given for ASA. [place] [facility] [place] and [place].

## 2022-11-15 NOTE — PLAN OF CARE
OT evaluation initiated  Problem: Occupational Therapy  Goal: Occupational Therapy Goal  Description: Goals set 11/15 to be addressed for 14 days with expiration date, 11/29:  Patient will increase functional independence with ADLs by performing:    Patient will demonstrate rolling to the right with max assist.  Not met   Patient will demonstrate rolling to the left with max assist.   Not met  Patient will demonstrate supine -sit with max  assist.   Not met  Patient will demonstrate stand pivot transfers with max assist.   Not met  Patient will demonstrate grooming while seated with max assist.   Not met  Patient will demonstrate upper body dressing with max assist while seated EOB.   Not met  Patient will demonstrate lower body dressing with max assist while seated EOB.   Not met  Patient will demonstrate toileting with max assist.   Not met  Patient will demonstrate bathing while seated EOB with max assist.   Not met  Patient's family / caregiver will demonstrate independence and safety with assisting patient with self-care skills and functional mobility.     Not met  Patient's family / caregiver will demonstrate independence with providing ROM and changes in bed positioning.   Not met  Patient and/or patient's family will verbalize understanding of stroke prevention guidelines, personal risk factors and stroke warning signs via teachback method.  Not met             Outcome: Ongoing, Progressing             Problem: Restraint, Nonbehavioral (Nonviolent)  Goal: Absence of Harm or Injury  Outcome: Ongoing, Progressing

## 2022-11-15 NOTE — ASSESSMENT & PLAN NOTE
Diffuse SAH noted on CTH after concern for seizures over the weekend.   No know trauma, though INR was supra-theraputic.   PCC/vit K(coumadin) and DDAVP (ASA) given prior to transport  Awaiting repeat INR  CTA head read as no concern for Aneurysm/avm  MRI brain without, MRA brain without pending  NSGY/Vasc Neuor following

## 2022-11-15 NOTE — ASSESSMENT & PLAN NOTE
Arrived on vent, PAo2 60% with peep 5  Bilateral effusion on CT chest  Assess and wean fiO2 as able  PCO2 > 50, but bicarb is extremely elevated (contractin alkalosis from Aggressive diureses)

## 2022-11-15 NOTE — PLAN OF CARE
[facility] Care Plan    POC reviewed with Enrico Burnett and family at 0300. Pt is unable to verbalize understanding. No acute events overnight. Will continue to monitor. See below and flowsheets for full assessment and VS info.     -Weaning Fentanyl currently @ 25  -Versed @ 3  -Blood, urine cultures in lab, sputum to be collected  -CT completed  -EEG in place    Is this a stroke patient? yes- Stroke booklet reviewed with patient, risk factors identified for patient and stroke booklet remains at bedside for ongoing education.     Neuro:  [place] Coma Scale  Best Eye Response: 1-->(E1) none  Best Motor Response: 3-->(M3) flexion to pain  Best Verbal Response: 1-->(V1) none  [place] Coma Scale Score: 5  Assessment Qualifiers: patient intubated, patient chemically sedated or paralyzed        24hr Temp:  [98.8 °F (37.1 °C)-99 °F (37.2 °C)]     CV:   Rhythm: paced rhythm (pacemaker)  BP goals:   SBP < 160  MAP > 65    Resp:   O2 Device (Oxygen Therapy): ventilator  Vent Mode: A/C  Set Rate: 18 BPM  Oxygen Concentration (%): 50  Vt Set: 460 mL  PEEP/CPAP: 5 cmH20    Plan: wean to extubate    GI/:     Diet/Nutrition Received: NPO  Last Bowel Movement: 11/15/22  Voiding Characteristics: incontinence    Intake/Output Summary (Last 24 hours) at 11/15/2022 0647  Last data filed at 11/15/2022 0601  Gross per 24 hour   Intake 204.56 ml   Output 1100 ml   Net -895.44 ml     Unmeasured Output  Urine Occurrence: (P) 1  Stool Occurrence: 1  Pad Count: (P) 1    Labs/Accuchecks:  Recent Labs   Lab 11/15/22  0255   WBC 9.33   RBC 4.56*   HGB 13.7*   HCT 45.5         Recent Labs   Lab 11/15/22  0255   *   K 2.4*   CO2 42*   CL 90*   BUN 68*   CREATININE 1.8*   ALKPHOS 96   ALT 26   AST 74*   BILITOT 3.3*      Recent Labs   Lab 11/15/22  0255   INR 1.3*   APTT 26.1      Recent Labs   Lab 11/15/22  0255   *   CPKMB 1.5   TROPONINI 0.205*   MB 0.2       Electrolytes: Electrolytes replaced  Accuchecks: Q4H    Gtts:    dextrose 10 % in water (D10W)      fentanyl 50 mcg/hr (11/15/22 0601)    midazolam 3 mg/hr (11/15/22 0601)    NORepinephrine bitartrate-D5W         LDA/Wounds:  Lines/Drains/Airways       Central Venous Catheter Line  Duration             Percutaneous Central Line Insertion/Assessment - Triple Lumen  right internal jugular -- days              Drain  Duration                  NG/OG Tube Center mouth -- days              Airway  Duration                  Airway - Non-Surgical -- days              Peripheral Intravenous Line  Duration                  Midline Catheter Insertion/Assessment  - Single Lumen Left cephalic vein (lateral side of arm) -- days         Midline Catheter Insertion/Assessment  - Single Lumen Right basilic vein (medial side of arm) -- days                  Wounds: Yes  Wound care consulted: Yes

## 2022-11-15 NOTE — ACP (ADVANCE CARE PLANNING)
Advance Care Planning     Date: 11/15/2022    Today a meeting took place: bedside    Patient Participation: Patient is unable to participate     Attendees (Name and  Relationship to patient): no one present,     Staff attendees (Name and  Role): SRINIVAS Bender NP    ACP Conversation (General): Delayed until family present    Code Status: FULL Code     ACP Documents: None    Goals of care: Cont. With current treatment           Recommendations/  Follow-up tasks: need follow up meeting       Length of ACP   conversation in minutes: 0 minutes

## 2022-11-15 NOTE — ASSESSMENT & PLAN NOTE
Arrived on keppra, unable to view/assess home meds or meds prior to arrival  Cont. Versed gtt, byt wean from 5 to 3mg/hr  Keppra 1gm bid IV  EEG cap placed overnight, will get formal EEG in Am

## 2022-11-15 NOTE — PLAN OF CARE
[facility] Care Plan    POC reviewed with Enrico Burnett and family at 1400. Pt unable to verbalize understanding. Questions and concerns addressed. No acute events today. Pt progressing toward goals. Will continue to monitor. See below and flowsheets for full assessment and VS info.     -Tube feeds started  -GOC completed (patient made DNR)  -Bath given; linen changed  -K+ replaced  -wound care  -lines dressing changed  -wound care consulted  -wound care completed  -pt weaned off Versed and Fentanyl  -Q4 accuchecks  -cEEG initiated      Is this a stroke patient? yes- Stroke booklet reviewed with patient and family, risk factors identified for patient and stroke booklet remains at bedside for ongoing education.     Neuro:  [place] Coma Scale  Best Eye Response: 1-->(E1) none  Best Motor Response: 3-->(M3) flexion to pain  Best Verbal Response: 1-->(V1) none  [place]ron Coma Scale Score: 5  Assessment Qualifiers: patient intubated, patient chemically sedated or paralyzed  Pupil PERRLA: yes     24 hr Temp:  [96.4 °F (35.8 °C)-99 °F (37.2 °C)]     CV:   Rhythm: paced rhythm (pacemaker in place)  BP goals:   SBP < 160  MAP > 65    Resp:   O2 Device (Oxygen Therapy): ventilator  Vent Mode: A/C  Set Rate: 18 BPM  Oxygen Concentration (%): 50  Vt Set: 460 mL  PEEP/CPAP: 8 cmH20    Plan: wean to extubate    GI/:     Diet/Nutrition Received: tube feeding  Last Bowel Movement: 11/15/22  Voiding Characteristics: incontinence    Intake/Output Summary (Last 24 hours) at 11/15/2022 1622  Last data filed at 11/15/2022 1601  Gross per 24 hour   Intake 537.74 ml   Output 1850 ml   Net -1312.26 ml     Unmeasured Output  Urine Occurrence: (P) 1  Stool Occurrence: 1  Pad Count: (P) 1    Labs/Accuchecks:  Recent Labs   Lab 11/15/22  0255   WBC 9.33   RBC 4.56*   HGB 13.7*   HCT 45.5         Recent Labs   Lab 11/15/22  0255 11/15/22  1028   *  --    K 2.4* 2.6*   CO2 42*  --    CL 90*  --    BUN 68*  --    CREATININE 1.8*  --     ALKPHOS 96  --    ALT 26  --    AST 74*  --    BILITOT 3.3*  --       Recent Labs   Lab 11/15/22  0255   INR 1.3*   APTT 26.1      Recent Labs   Lab 11/15/22  0255   *   CPKMB 1.5   TROPONINI 0.205*   MB 0.2       Electrolytes: Electrolytes replaced  Accuchecks: ACHS    Gtts:   dextrose 10 % in water (D10W)      fentanyl Stopped (11/15/22 0753)    midazolam Stopped (11/15/22 1337)    NORepinephrine bitartrate-D5W         LDA/Wounds:  Lines/Drains/Airways       Central Venous Catheter Line  Duration             Percutaneous Central Line Insertion/Assessment - Triple Lumen  right internal jugular -- days              Drain  Duration                  NG/OG Tube Center mouth -- days              Airway  Duration                  Airway - Non-Surgical -- days              Peripheral Intravenous Line  Duration                  Midline Catheter Insertion/Assessment  - Single Lumen Left cephalic vein (lateral side of arm) -- days         Midline Catheter Insertion/Assessment  - Single Lumen Right basilic vein (medial side of arm) -- days                  Wounds: Yes  Wound care consulted: Yes

## 2022-11-15 NOTE — HOSPITAL COURSE
11/15/2022: weaning versed to off, continue formal EEG and Keppra. Echo done, EF 7%. Patient made DNR by family. Added diamox 500. Consulted ID for staph bacteremia, palliative.   11/16/2022: OSH hospital records received via print. Per OSH records, patient inbubated on 11/11, central line placed 11/14. Patient noted to have seizure-like activity at OSH although no EEG records were obtained. New TTE obtained here w/ EF 10-15%; lowered MAP goal to >60. A/C to SIMV; ABG with improving alkalosis; continuing diamox. Continuing abx for bacteremia; BC NGTD.   11/17/2022: Febrile t/o today with increased WBC; abx switch to Oxacillin per ID. CXR with improving pulmonary edema. ABGs consistent with improving alkalosis. Vent SIMV. Duonebs and CPT for mucus production. Cr increasing; will CTM. Keppra 500 BID.   11/18/2022: Persistent fever although WBC downtrending. Started Levo this PM for MAP <60. Lactic and procal pending. EEG without seizure activity. Increased insulin regimen for hyperglycemia.   11/19/2022: 250cc FW boluses and 1x Lasix 80 IV for hypernatremia. Continuing with electrolyte replacement. Patient with diaphragmatic spasms, abdominal distension and slight constipation - f/u CXR and KUB. Will continue GOC discussion with family today.   11/20/2022: Events of twitching concerning for seizure overnight, anti-seizure meds increased and cap EEG without further events, none this morning.  Also had brief episode of mucous plugging requiring lavage. Febrile again overnight, continuing antibiotics as ordered, not obtaining SIVA unless family decides to pursue full care given high risk of procedure in patient's highly unstable state. Hypernatremia remains significant, .45 NS x6 hours administered, will likely need re-diuresis to maintain euvolemia with heart failure.  Intermittent Afib RVR with decreased BP, restarted on amiodarone infusion as gut not appearing to absord many medications, found in residuals by RN on OG  tube.  Awaiting family decision making regarding goals of care.  11/21/2022: More events concerning for seizures overnight with facial twitching, remains essentially unresponsive off sedation.  Still febrile, intermittently hypotensive improved from yesterday on amiodarone gtt.  Still high residuals from NG, currently TF held, bowel regimen continued. Still on oxacillin, holding SIVA unless family decides to pursue full care.

## 2022-11-15 NOTE — CONSULTS
[facility] - Neuro Critical Care  Neurosurgery  Consult Note    Inpatient consult to Neurosurgery  Consult performed by: Benedict Garsia MD  Consult ordered by: Crow Bender NP        Subjective:     Chief Complaint/Reason for Admission: ICH    History of Present Illness: Enrico Burnett is a 68 yo male with known PMH of Afib who is transferred to neuro ICU critically ill after prolonged hospital stay at outside facility with concern for multiple parenchymal ICH in the setting of supratherapeutic INR.  Mr. Burnett per report had an INR of 4 at OSH and had mental decline prompting intubation.  He has had metabolic alkalosis and hypotension and is now on levo and EEG.  NSGY consulted due to small diffuse R>L parietal and frontal multifocal hemorrhages. Given Kcentra, DDAVP and Vit K at OSH For Coumadin and ASA use.       No medications prior to admission.       Review of patient's allergies indicates:  Not on File    No past medical history on file.  No past surgical history on file.  Family History    None       Tobacco Use    Smoking status: Not on file    Smokeless tobacco: Not on file   Substance and Sexual Activity    Alcohol use: Not on file    Drug use: Not on file    Sexual activity: Not on file     Review of Systems   Unable to perform ROS: Intubated   Objective:     Weight: 100 kg (220 lb 7.4 oz)  Body mass index is 29.9 kg/m².  Vital Signs (Most Recent):  Temp: 99 °F (37.2 °C) (11/14/22 2305)  Pulse: 74 (11/14/22 2305)  Resp: 18 (11/14/22 2305)  BP: 102/62 (11/14/22 2305)  SpO2: 97 % (11/14/22 2305) Vital Signs (24h Range):  Temp:  [99 °F (37.2 °C)] 99 °F (37.2 °C)  Pulse:  [74-77] 74  Resp:  [18] 18  SpO2:  [94 %-98 %] 97 %  BP: (102)/(62) 102/62                Vent Mode: A/C  Oxygen Concentration (%):  [50-60] 50  Resp Rate Total:  [18 br/min] 18 br/min  Vt Set:  [460 mL] 460 mL  PEEP/CPAP:  [5 cmH20] 5 cmH20  Mean Airway Pressure:  [9.2 cmH20] 9.2 cmH20         Physical Exam    Neurosurgery Physical  Exam  [id number]  Sedated on fent / versed gtt  PERRL  BS intact  W/d BLE L>R   W/d BUE L>R    Intubated  Distal LE rashes possible cellulitis  No distress  Appears chronically ill    Significant Labs:  Recent Labs   Lab 11/14/22 0251      K 2.8*   CL 92*   CO2 >40*   BUN 47*   CREATININE 1.61*   CALCIUM 8.2*     Recent Labs   Lab 11/14/22 0251   WBC 8.3   HGB 14.2   HCT 49.9        Recent Labs   Lab 11/14/22 0251   INR 2.29*     Microbiology Results (last 7 days)       Procedure Component Value Units Date/Time    Blood culture [[id number]] Collected: 11/15/22 0018    Order Status: Sent Specimen: Blood from Peripheral, Lower Leg, Right Updated: 11/15/22 0027    Blood culture [[id number]] Collected: 11/15/22 0019    Order Status: Sent Specimen: Blood from Peripheral, Forearm, Left Updated: 11/15/22 0027    Culture, Respiratory with Gram Stain [[id number]]     Order Status: No result Specimen: Respiratory           All pertinent labs from the last 24 hours have been reviewed.    Significant Diagnostics:  I have reviewed all pertinent imaging results/findings within the past 24 hours.    Assessment/Plan:     SAH (subarachnoid hemorrhage)  66 yo male with PMH of Afib on coumadin with elevated INR and mentation change prompting intubation found to have multifocal small parenchymal ICH on rough OSH scan.    --Admitted to [facility]  --Q1 hour checks  --Wean sedation, WTE as tolerated  --EEG, AEDs per [facility]  --Repeat CT head overnight  --SBP < 160  --Continue to monitor INR/PT and reverse as needed for goal INR < 1.4.   --HOB >30  --Follow-up pre-op labs (CBC/CMP/PT-INR/PTT/T&S)  --NPO at this time for possible operative intervention  --Continue to monitor clinically, notify NSGY immediately with any changes in neuro status      D/w staff, Dr. Quiroz         Thank you for your consult. I will follow-up with patient. Please contact us if you have any additional questions.    Benedict Garsia MD  Neurosurgery  [facility] - Neuro  Critical Care

## 2022-11-15 NOTE — PT/OT/SLP PROGRESS
Speech Language Pathology  Discharge    Enrico Burnett  MRN: [id number]    Patient not seen today secondary to pt intubated at this time. Please re-consult when medically appropriate. No further ST warranted at this time.     11/15/2022

## 2022-11-15 NOTE — PROGRESS NOTES
Pharmacokinetic Initial Assessment: IV Vancomycin    Assessment/Plan:    -Patient transferred from [facility]  -According to pharmacy department, previous regimen was 1250 mg every 24 hour, last dose given at 0900 on 11/14  -Random level at [facility] resulted at 31.1 which is expected if last dose given at time reported by OSH  -Desired empiric serum trough concentration is 15 to 20 mcg/mL  -Draw vancomycin random level on 11/15 at 0800.  -Pharmacy will continue to follow and monitor vancomycin.      Please contact pharmacy at extension [phone] with any questions regarding this assessment.     Thank you for the consult,   Aura Devine       Patient brief summary:  Enrico Burnett is a 67 y.o. male initiated on antimicrobial therapy with IV Vancomycin for treatment of suspected bacteremia    Drug Allergies:   Review of patient's allergies indicates:  Not on File    Actual Body Weight:   100 kg    Renal Function:   Estimated Creatinine Clearance: 56 mL/min (A) (based on SCr of 1.61 mg/dL (H)).     Dialysis Method (if applicable):  N/A    CBC (last 72 hours):  Recent Labs   Lab Result Units 11/13/22  0154 11/14/22 0251 11/15/22  0004   WBC K/UL 9.0 8.3  --    Hemoglobin g/dL 15.0 14.2  --    Hemoglobin A1C %  --   --  6.8*   Hct % 53.1* 49.9  --    Platelets K/* 131  --        Metabolic Panel (last 72 hours):  Recent Labs   Lab Result Units 11/12/22  1210 11/13/22  0154 11/14/22  0251 11/15/22  0141   Sodium mmol/L  --  144 145  --    Chloride mmol/L  --  93* 92*  --    CO2 mmol/L  --  >40* >40*  --    Glucose mg/dL  --  119* 135*  --    Glucose, UA   --   --   --  3+*   Creatinine mg/dL  --  1.54* 1.61*  --    Albumin Level g/dL 3.0*  --   --   --    Bilirubin Total mg/dL 4.4*  --   --   --    Alk Phos IU/L 94  --   --   --    Aspartate Aminotransferase IU/L 40*  --   --   --    Alanine Aminotransferase IU/L 18  --   --   --    Magnesium Level mg/dL  --   --  1.9  --    Phosphorus Level mg/dL 1.7*  --   5.3*  --        Drug levels (last 3 results):  Recent Labs   Lab Result Units 11/15/22  0026   Vancomycin, Random ug/mL 31.1       Microbiologic Results:  Microbiology Results (last 7 days)       Procedure Component Value Units Date/Time    Blood culture [[id number]] Collected: 11/15/22 0018    Order Status: Sent Specimen: Blood from Peripheral, Lower Leg, Right Updated: 11/15/22 0027    Blood culture [[id number]] Collected: 11/15/22 0019    Order Status: Sent Specimen: Blood from Peripheral, Forearm, Left Updated: 11/15/22 0027    Culture, Respiratory with Gram Stain [[id number]]     Order Status: No result Specimen: Respiratory

## 2022-11-15 NOTE — NURSING
[facility] tech contacted RN regarding lead and model information for AICD. Unable to obtain this information at this time, provider aware of barrier, Plan to contact [facility] where AICD was placed for information of leads and model # .

## 2022-11-15 NOTE — CONSULTS
Inpatient consult to Physical Medicine Rehab  Consult performed by: Aura Davison NP  Consult ordered by: Crow Bender NP  Reason for consult: Assess rehab needs    Reviewed patient history and current admission.  Rehab team following.  Full consult to follow.    RAVI Childers, FNP-C  Physical Medicine & Rehabilitation   11/15/2022

## 2022-11-15 NOTE — PROCEDURES
EEG Extended Monitoring greater than one hour    Date/Time: 11/14/2022 9:47 PM  Performed by: Aron Newsome MD  Authorized by: Crow Bender NP       ICU EEG/VIDEO MONITORING REPORT    DATE OF SERVICE: 11/14/22-11/15/22  EEG NUMBER: [id number]  REQUESTED BY: [place]  [place] [facility] [place]     METHODOLOGY   Electroencephalographic (EEG) recording is with electrodes placed according to the International 10-20 placement system.  Thirty two (32) channels of digital signal are simultaneously recorded from the scalp and may include EKG, EMG, and/or eye monitors.   Recording band pass was 0.1 to 512 hz.  Digital video recording of the patient is simultaneously recorded with the EEG.  The nursing staff report clinical symptoms and may press an event button when the patient has symptoms of clinical interest to the treating physicians.  EEG and video recording is stored and archived in digital format.  The entire recording is visually reviewed and the times identified by computer analysis as being spikes or seizures are reviewed again.  Activation procedures which include photic stimulation, hyperventilation and instructing patients to perform simple task are done in selected patients.   Compresses spectral analysis (CSA) is also performed on the activity recorded from each individual channel.  This is displayed as a power display of frequencies from 0 to 30 Hz over time.   The CSA analysis is done and displayed continuously.  This is reviewed for asymmetries in power between homologous areas of the scalp and for presence of changes in power which canbe seen when seizures occur.  Sections of suspected abnormalities on the CSA is then compared with the original EEG recording.     [employer] software was also utilized in the review of this study.  This software suite analyzes the EEG recording in multiple domains.  Coherence and rhythmicity is computed to identify EEG sections which may contain organized seizures.  Each  channel undergoes analysis to detect presence of spike and sharp waves which have special and morphological characteristic of epileptic activity.  The routine EEG recording is converted from spacial into frequency domain.  This is then displayed comparing homologous areas to identify areas of significant asymmetry.  Algorithm to identify non-cortically generated artifact is used to separate eye movement, EMG and other artifact from the EEG.      Recording Times  Start on 11/14/22 at 23:01:49  Stop on 11/15/22 at 09:37:55  A total of 10 hours of EEG was recorded.    EEG FINDINGS  The record shows a poor organization at rest, consisting of a 6 Hz posterior dominant rhythm with poor reactivity. There is mild bilateral beta activity. There is continuous diffuse delta and theta range background slowing.    A second state is characterized by attenuation of the background, vertex waves, and further bilateral slowing.     Provocative maneuvers including hyperventilation and photic stimulation were not performed.     EKG recording shows a sinus rhythm.    There is no push button or clinical event.    IMPRESSION:  Abnormal study due to moderate to severe  diffuse background slowing consistent with diffuse cerebral dysfunction and encephalopathy which may be on the basis of toxic, metabolic, or primary neuronal disorder.     Aron Newsome MD  Department of Neurology  [facility]

## 2022-11-15 NOTE — PT/OT/SLP PROGRESS
Physical Therapy      Patient Name:  Enrico Burnett   MRN:  [id number]    PT consult received and acknowledged.  The patient is currently intubated and appropriate for only one therapy discipline at this time.  OT will follow this patient while they are only appropriate for in bed activities and will notify PT when the patient is ready for mobilization.  PT will follow up when patient is appropriate for EOB or OOB activities.     Tayla Schultz, PT, DPT  11/15/2022

## 2022-11-15 NOTE — SUBJECTIVE & OBJECTIVE
No past medical history on file.  No past surgical history on file.   No current facility-administered medications on file prior to encounter.     No current outpatient medications on file prior to encounter.      Allergies: Patient has no allergy information on record.    No family history on file.     Review of Systems   Unable to perform ROS: Intubated   Objective:     Vitals:    Temp: 99 °F (37.2 °C)  Pulse: 74  Rhythm: (P) paced rhythm (pacemaker)  BP: 102/62  MAP (mmHg): 69  Resp: 18  SpO2: 97 %  Oxygen Concentration (%): 50  O2 Device (Oxygen Therapy): ventilator  Vent Mode: A/C  Set Rate: 18 BPM  Vt Set: 460 mL  PEEP/CPAP: 5 cmH20  Peak Airway Pressure: 24 cmH2O  Mean Airway Pressure: 9.2 cmH20  Plateau Pressure: 0 cmH20    Temp  Min: 99 °F (37.2 °C)  Max: 99 °F (37.2 °C)  Pulse  Min: 74  Max: 77  BP  Min: 102/62  Max: 102/62  MAP (mmHg)  Min: 69  Max: 69  Resp  Min: 18  Max: 18  SpO2  Min: 94 %  Max: 98 %  Oxygen Concentration (%)  Min: 50  Max: 60    11/14 0701 - 11/15 0700  In: -   Out: 1100 [Urine:1100]           Physical Exam  Vitals and nursing note reviewed.   HENT:      Head: Normocephalic.      Nose: Nose normal.   Cardiovascular:      Rate and Rhythm: Normal rate. Rhythm irregular.   Pulmonary:      Comments: Coarse/diminished bases  Abdominal:      General: Bowel sounds are normal.      Palpations: Abdomen is soft.   Skin:     General: Skin is dry.      Capillary Refill: Capillary refill takes 2 to 3 seconds.   Neurological:      Comments: E2 V1t M3  PERRLA  Cough/gag intact  Minimal hand movement to deep pain  Flexion bilateral lower extremity to deep pain        Today I personally reviewed pertinent medications, lines/drains/airways, imaging, cardiology results, laboratory results, microbiology results,

## 2022-11-15 NOTE — NURSING
[facility] with vent, O2, IV pumps, pressley cath    Report received from:  ED RN    Type of stroke/diagnosis: Bilat. subarachnoid     Current symptoms: worsening resp. Status, multiple falls, unsteady gait    Skin assessment done: Yes  Wounds noted: Left ear open wound, Left posterior upper arm, multiple skin tears to bilat. Upper extremities, open wound wound right lower quad. Abdomen, rash to lower abdomen and perineum, blanchable, bilateral lower extremity multiple skin tears and discoloration. Multiple areas of reddness to buttocks, non blanchable    *If wounds noted, was Wound Care consulted? yes    Forde Completed? No, failed, sedated/intubated    Patient Belongings on Admit: none    NCC notified: name of person notified  alverto Bender NP

## 2022-11-15 NOTE — ASSESSMENT & PLAN NOTE
Unsure of cause, it was mentioned when calling for transfer that patient may be bacteremic/sepsis  Aggressive diuresis over past few weeks/days, hypovolemia  AEDs versed gtt, and possible propofol prior to transfer  Levo for SBP > 100 and MAP > 65  Lactic pending

## 2022-11-15 NOTE — NURSING
NSCCU Admit: 2022 MRN:[id number], :1955  Dx: <principal problem not specified>    Procedure/Audit? {yes.no:58477}    LDA: PIVs, ETT, Youssef, Tunneled Cath, and Pressure Ulcer  GTT: Fent, Levo, Versed    POC:        No past medical history on file.

## 2022-11-15 NOTE — CONSULTS
Epilepsy Team    CC: EEG placed for concern for epileptiform activity/seizures    HPI: 67 year old male with a PMHx of paroxysmal atrial fibrillation on Coumadin, HFrEF, s/p AICD, DM2, CKD, and peripheral vascular disease who was transferred from [facility] to [facility] on 11/14 for higher level of care and admitted to [facility] for further management of SAH. HPI per chart review as patient intubated and sedated. Per chart, patient was admitted at OSH 11/5 for CHF exacerbation, hospital course complicated by afib RVR, failed DCCV, encephalopathy, staph aureus bacteremia, seizure like activity (some concern for convulsive syncope), intubation for airway protection, supratherapeutic INR, and diffuse SAH. EEG CAP placed 11/14.    Unable to obtain patient's family and social history due to patient intubated.    Patient is currently maintained on Levetiracetam 1g BID and Midazolam gtt.      Review of systems:  Not performed secondary to patient intubated.    Physical Exam  General: Sedated, intubated, afebrile.  HEENT: Normocephalic. Atraumatic. EEG in place.   Pulmonary: Effort normal, no respiratory distress.  Cardiac: Normal rate.   Abdominal: Soft.  Skin: No jaundice. Numerous wounds to LLE.  Psych: Unable to assess.   Neuro:  Comatose   DOT OU   Corneal intact OU   No spontaneous eye opening   Face symmetric   Tongue midline   Triple flexion in BLE    Exam findings to suggest seizures:  Myoclonus - no   eye twitching - no   Nystagmus - no   gaze deviation - no   waxy rigidity - no      CAP EEG reviewed by Epileptologist, findings include moderate to severe encephalopathy; see EEG report for full details.    Encephalopathy  Recommendations: Rehook to continuous vEEG 11/15.  EEG reviewed by Epileptologist, preliminary findings include encephalopathy, no seizures; full EEG report to follow on 11/16. Encephalopathy is nonspecific in regards to etiology. No indication for escalation of anti-seizure drug at this time. Findings  and recommendations discussed with primary team.     SAH  - CTH 11/15 with extensive SAH and IVH  - Management per NSGY, NCC    Acute respiratory failure with hypoxia and hypercapnia   - Arrived intubated from OSH   - Vent management per NCC    ROSEMARIE on CKD  - Cr 1.8 today  - Management per NCC    Bacteremia  - Reported at OSH  - On Zosyn  - Repeat blood cx NGTD  - Management per NCC      Siobhan Del Toro PA-C  Neurology Epilepsy  Staff: Dr. Newsome

## 2022-11-15 NOTE — ASSESSMENT & PLAN NOTE
66 yo male with PMH of Afib on coumadin with elevated INR and mentation change prompting intubation found to have multifocal small parenchymal ICH on rough OSH scan.    --Admitted to NCC  --Q1 hour checks  --Wean sedation, WTE as tolerated  --EEG, AEDs per NCC  --Repeat CT head overnight  --SBP < 160  --Continue to monitor INR/PT and reverse as needed for goal INR < 1.4.   --HOB >30  --Follow-up pre-op labs (CBC/CMP/PT-INR/PTT/T&S)  --NPO at this time for possible operative intervention  --Continue to monitor clinically, notify NSGY immediately with any changes in neuro status      D/w staff, Dr. Quiroz

## 2022-11-15 NOTE — H&P
[facility] - Neuro Critical Care  Neurocritical Care  History & Physical    Admit Date: 11/14/2022  Service Date: 11/14/2022  Length of Stay: 1    Subjective:     Chief Complaint: <principal problem not specified>    History of Present Illness: 66 yo M, transferred for eval and management for SAH. No notes sent with patient, unable to get exact events/timeline. It appears he was being treated for CHF, and had seizure acitivy over the weekend requiring intuabtion. CTH showed diffuse SAH, CTA read as no Aneurysm/avm. INR was supratheraputic a few days ago > 4, but had decreased to 2's last few days,. No reports of trauma, possible spontaneuous in setting of supratheraputic INR. PCC and Vit K given for elevated INR. DDAVP given for ASA. [place] [facility] [place] and management.       No past medical history on file.  No past surgical history on file.   No current facility-administered medications on file prior to encounter.     No current outpatient medications on file prior to encounter.      Allergies: Patient has no allergy information on record.    No family history on file.     Review of Systems   Unable to perform ROS: Intubated   Objective:     Vitals:    Temp: 99 °F (37.2 °C)  Pulse: 74  Rhythm: (P) paced rhythm (pacemaker)  BP: 102/62  MAP (mmHg): 69  Resp: 18  SpO2: 97 %  Oxygen Concentration (%): 50  O2 Device (Oxygen Therapy): ventilator  Vent Mode: A/C  Set Rate: 18 BPM  Vt Set: 460 mL  PEEP/CPAP: 5 cmH20  Peak Airway Pressure: 24 cmH2O  Mean Airway Pressure: 9.2 cmH20  Plateau Pressure: 0 cmH20    Temp  Min: 99 °F (37.2 °C)  Max: 99 °F (37.2 °C)  Pulse  Min: 74  Max: 77  BP  Min: 102/62  Max: 102/62  MAP (mmHg)  Min: 69  Max: 69  Resp  Min: 18  Max: 18  SpO2  Min: 94 %  Max: 98 %  Oxygen Concentration (%)  Min: 50  Max: 60    11/14 0701 - 11/15 0700  In: -   Out: 1100 [Urine:1100]           Physical Exam  Vitals and nursing note reviewed.   HENT:      Head: Normocephalic.      Nose: Nose normal.    Cardiovascular:      Rate and Rhythm: Normal rate. Rhythm irregular.   Pulmonary:      Comments: Coarse/diminished bases  Abdominal:      General: Bowel sounds are normal.      Palpations: Abdomen is soft.   Skin:     General: Skin is dry.      Capillary Refill: Capillary refill takes 2 to 3 seconds.   Neurological:      Comments: E2 V1t M3  PERRLA  Cough/gag intact  Minimal hand movement to deep pain  Flexion bilateral lower extremity to deep pain        Today I personally reviewed pertinent medications, lines/drains/airways, imaging, cardiology results, laboratory results, microbiology results,         Assessment/Plan:     Neuro  Byron coma scale total score 3-8  E1 V1 M3  On sedation, AEDS. Will get eeg to eval for seizure, and wean AEDS      SAH (subarachnoid hemorrhage)  Diffuse SAH noted on CTH after concern for seizures over the weekend.   No know trauma, though INR was supra-theraputic.   PCC/vit K(coumadin) and DDAVP (ASA) given prior to transport  Awaiting repeat INR  CTA head read as no concern for Aneurysm/avm  MRI brain without, MRA brain without pending  NSGY/Vasc Neuor following     Seizure  Arrived on keppra, unable to view/assess home meds or meds prior to arrival  Cont. Versed gtt, byt wean from 5 to 3mg/hr  Keppra 1gm bid IV  EEG cap placed overnight, will get formal EEG in Am     Pulmonary  Acute respiratory failure with hypoxia and hypercapnia  Arrived on vent, PAo2 60% with peep 5  Bilateral effusion on CT chest  Assess and wean fiO2 as able  PCO2 > 50, but bicarb is extremely elevated (contractin alkalosis from Aggressive diureses)        Cardiac/Vascular  Elevated troponin  Elevated prior to arrival, will trend     Atrial fibrillation  Rate stable  AC held in setting of SAH  Amio cont.     Hypotension  Unsure of cause, it was mentioned when calling for transfer that patient may be bacteremic/sepsis  Aggressive diuresis over past few weeks/days, hypovolemia  AEDs versed gtt, and possible  propofol prior to transfer  Levo for SBP > 100 and MAP > 65  Lactic pending         Combined systolic and diastolic congestive heart failure  unable to view any documentation from OSH, it was mentioned that EF was 20-25%, and diastolic function was also decreased  Repeating echo, as no images was sent  Levo as vasopressor to help with contractility  EKG pending  -It did appear patient may have been on Entresto at some point, so possible acute on chronic failure    BNP pending to eval trend  .      Renal/  Metabolic alkalosis  Bicarb > 40, concern for contraction alkalosis in setting of aggressive diuressis  Lasix held  Diamox in AM     ROSEMARIE (acute kidney injury)  ROSEMARIE vs ROSEMARIE on CKD  follow I/O, crt/bun      ID  Bacteremia  Blood Culture pending  Zosyn and Vanc ordered  Vanc level considering previously on vanc and ROSEMARIE        The patient is being Prophylaxed for:  Venous Thromboembolism with: Mechanical  Stress Ulcer with: H2B  Ventilator Pneumonia with: chlorhexidine oral care    Activity Orders            Turn patient starting at 11/15 0000    Elevate HOB starting at 11/14 2326    Diet NPO: NPO starting at 11/14 2326          Full Code    Critical condition in that Patient has a condition that poses threat to life and bodily function: acute mized resp failure, systolic/diastolic heart failure, SAH in setting of supratheraputic INR     45 minutes of Critical care time was spent personally by me on the following activities: development of treatment plan with patient or surrogate and bedside caregivers, discussions with consultants, evaluation of patient's response to treatment, examination of patient, ordering and performing treatments and interventions, ordering and review of laboratory studies, ordering and review of radiographic studies, pulse oximetry, antibiotic titration if applicable, vasopressor titration if applicable, re-evaluation of patient's condition. This critical care time did not overlap with that of  any other provider or involve time for any procedures. There is high probability for acute neurological change leading to clinical and possibly life-threatening deterioration requiring highest level of physician preparedness for urgent intervention.      Crow Bender NP  Neurocritical Care  [facility]

## 2022-11-15 NOTE — SUBJECTIVE & OBJECTIVE
Past Medical History:   Diagnosis Date    CAD (coronary artery disease)     CHF (congestive heart failure)     Chronic a-fib     CKD (chronic kidney disease) stage 3, GFR 30-59 ml/min     Diabetes mellitus type 2 in obese     HTN (hypertension), benign     Hypothyroid     Obesity (BMI 30-39.9)     Seizures     Venous stasis      Past Surgical History:   Procedure Laterality Date    CORONARY ARTERY BYPASS GRAFT      2 vessel    INSERTION OF PACEMAKER  2018    RENAL BIOPSY      TONSILLECTOMY       No family history on file.  Social History     Tobacco Use    Smoking status: Former     Types: Cigarettes     Quit date:      Years since quittin.8    Smokeless tobacco: Never   Substance Use Topics    Alcohol use: Not Currently    Drug use: Never     Review of patient's allergies indicates:  Not on File    Medications: I have reviewed the current medication administration record.    Medications Prior to Admission   Medication Sig Dispense Refill Last Dose    amiodarone (PACERONE) 200 MG Tab Take 200 mg by mouth.       empagliflozin (JARDIANCE) 10 mg tablet Take 10 mg by mouth.       aspirin (ECOTRIN) 81 MG EC tablet Take 81 mg by mouth once daily.       furosemide (LASIX) 80 MG tablet Take 80 mg by mouth every morning.       metoprolol succinate (TOPROL-XL) 100 MG 24 hr tablet Take 100 mg by mouth once daily.       pravastatin (PRAVACHOL) 40 MG tablet Take 40 mg by mouth once daily.       sacubitriL-valsartan (ENTRESTO) 49-51 mg per tablet Entresto 49 mg-51 mg tablet       warfarin (COUMADIN) 5 MG tablet Take 5 mg by mouth.          Review of Systems   Unable to perform ROS: Intubated   Objective:     Vital Signs (Most Recent):  Temp: 99 °F (37.2 °C) (22)  Pulse: 74 (11/15/22 0314)  Resp: 15 (11/15/22 0314)  BP: 102/62 (22)  SpO2: 99 % (11/15/22 0314)    Vital Signs Range (Last 24H):  Temp:  [99 °F (37.2 °C)]   Pulse:  [74-77]   Resp:  [15-18]   BP: (102)/(62)   SpO2:  [94 %-99 %]      Physical Exam  Vitals and nursing note reviewed.   Constitutional:       Appearance: He is obese. He is ill-appearing.      Comments: comatose   HENT:      Head: Normocephalic and atraumatic.   Eyes:      Pupils: Pupils are equal, round, and reactive to light.   Cardiovascular:      Rate and Rhythm: Tachycardia present.   Pulmonary:      Comments: Intubated on vent coarse breath sounds  Abdominal:      General: Abdomen is flat. Bowel sounds are normal.      Palpations: Abdomen is soft.   Musculoskeletal:      Cervical back: Normal range of motion.      Right lower leg: Edema present.      Left lower leg: Edema present.   Skin:     Findings: Bruising and rash present.      Comments: Multiple skin tears and wounds all over    Neurological:      Comments: Comatose on vent, min movment hands to deep pain, flexion to Rambo LE       Neurological Exam:   LOC: comatose  Attention Span: comatose  Language: Intubated  Articulation: Untestable due to intubation  Orientation: Person, Place, Time , Untestable due to intubation  Visual Fields: Full  EOM (CN III, IV, VI): Full/intact  Pupils (CN II, III): PERRL  Facial Sensation (CN V): Corneal reflex present Bilateral  Facial Movement (CN VII): Unable to test   Gag Reflex: present  Reflexes: flexor plantar responses bilaterally  Motor: min hand movement to deep pain, flexion to Rambo LE  Cerebellum: No evidence of appendicular or axial ataxia  Sensation: Intact to light touch, temperature and vibration  Tone: Flaccid  LUE , LLE, RUE, and RLE       Laboratory:  CMP:   Recent Labs   Lab 11/15/22  0255   CALCIUM 8.6*   ALBUMIN 2.1*   PROT 6.9   *   K 2.4*   CO2 42*   CL 90*   BUN 68*   CREATININE 1.8*   ALKPHOS 96   ALT 26   AST 74*   BILITOT 3.3*     CBC:   Recent Labs   Lab 11/15/22  0255   WBC 9.33   RBC 4.56*   HGB 13.7*   HCT 45.5      *   MCH 30.0   MCHC 30.1*     Lipid Panel:   Recent Labs   Lab 11/15/22  0004   CHOL 91*   LDLCALC 36.8*   HDL 14*   TRIG  201*     Coagulation:   Recent Labs   Lab 11/15/22  0255   INR 1.3*   APTT 26.1     Hgb A1C:   Recent Labs   Lab 11/15/22  0004   HGBA1C 6.8*     TSH:   Recent Labs   Lab 11/15/22  0004   TSH 3.919       Diagnostic Results:      Brain imaging:  Per [facility]     Vessel Imaging:  CTA Head per [facility]     Cardiac Evaluation:

## 2022-11-15 NOTE — PT/OT/SLP EVAL
Occupational Therapy   Evaluation    Name: Enrico Burnett  MRN: [id number]  Admitting Diagnosis:  SAH (subarachnoid hemorrhage)  Recent Surgery: * No surgery found *      Recommendations:     Discharge Recommendations:  (pending extubation)  Discharge Equipment Recommendations:   (pending extubation)  Barriers to discharge:  None    Assessment:     Enrico Burnett is a 67 y.o. male with a medical diagnosis of SAH (subarachnoid hemorrhage).  He presents with performance deficits affecting function: weakness, impaired endurance, impaired sensation, impaired self care skills, impaired functional mobility, gait instability, impaired balance, decreased safety awareness, decreased lower extremity function, decreased upper extremity function, decreased coordination, impaired cognition, abnormal tone, decreased ROM, impaired coordination, impaired fine motor.      Rehab Prognosis: Good; patient would benefit from acute skilled OT services to address these deficits and reach maximum level of function.       Plan:     Patient to be seen 3 x/week to address the above listed problems via self-care/home management, therapeutic activities, therapeutic exercises, neuromuscular re-education, cognitive retraining  Plan of Care Expires: 12/13/22  Plan of Care Reviewed with: patient    Subjective   Patient: Nonverbal; intubated    Occupational Profile:  Per chart review, patient resides in [place], [place] with wife in one story home with 3 steps to enter.  PTA patient was independent with ADLs and began using a borrowed walker a few weeks ago; however it was too wide to fit in the hallway and he had to turn it sideways.  Patient with a decline in the last few weeks and sometimes needed assistance with bathing and dressing.    Pain/Comfort:  Pain Rating 1: 0/10  Pain Rating Post-Intervention 1: 0/10    Patients cultural, spiritual, Yarsani conflicts given the current situation: no    Objective:     Communicated with: Nurse prior to  session.  Patient found supine with bed alarm, pressley catheter, telemetry, restraints, pulse ox (continuous), peripheral IV, NG tube, blood pressure cuff, PICC line, EEG upon OT entry to room.    General Precautions: Standard, aspiration, fall, NPO   Orthopedic Precautions:N/A   Braces: N/A  Respiratory Status:  ventilator    Occupational Performance:    Bed Mobility:    Patient completed Rolling/Turning to Left with  total assistance  Patient completed Rolling/Turning to Right with total assistance    Functional Mobility/Transfers:  Dependent drawsheet transfer    Activities of Daily Living:  Grooming: total assistance while supine  Feeding:  NPO    Cognitive/Visual Perceptual:  Cognitive/Psychosocial Skills:     -       Oriented to: Daily orientation provided   -       Follows Commands/attention:unable to follow commands  -       Communication: nonverbal; intubated    Physical Exam:  Postural examination/scapula alignment:    -       Rounded shoulders  Skin integrity: wound left ear, left posterior upper arm, bilateral UE skin tears; thin/fragile  Upper Extremity Range of Motion:  PROM bilateral UE    AMPAC 6 Click ADL:  AMPAC Total Score: 6    Treatment & Education:  Patient education provided on role of OT.  Daily orientation provided.  Manual lymph drainage techniques performed prior to ROM.  PROM performed bilateral UE/LEs one set x 10 rep in all planes of motion with stretches provided at end range; sustained stretch provided for ankle dorsiflexion.   Provided multi-sensory stimulation to prevent sensory deprivation and improve clinical outcomes.  Positioning provided for midline orientation with bilateral UEs elevated and heels lifted off mattress; positioning provided to prevent flexor synergy pattern in UE (internal rotation and adduction) to decrease risk of post-stroke hemiplegic pain.   Gentle cervical rotation provided.   Family not present during the session.  Continued education, patient/ family  training recommended.      Patient left supine with all lines intact, call button in reach, bed alarm on, and restraints reapplied at end of session    GOALS:   Multidisciplinary Problems       Occupational Therapy Goals          Problem: Occupational Therapy    Goal Priority Disciplines Outcome Interventions   Occupational Therapy Goal     OT, PT/OT Ongoing, Progressing    Description: Goals set 11/15 to be addressed for 14 days with expiration date, 11/29:  Patient will increase functional independence with ADLs by performing:    Patient will demonstrate rolling to the right with max assist.  Not met   Patient will demonstrate rolling to the left with max assist.   Not met  Patient will demonstrate supine -sit with max  assist.   Not met  Patient will demonstrate stand pivot transfers with max assist.   Not met  Patient will demonstrate grooming while seated with max assist.   Not met  Patient will demonstrate upper body dressing with max assist while seated EOB.   Not met  Patient will demonstrate lower body dressing with max assist while seated EOB.   Not met  Patient will demonstrate toileting with max assist.   Not met  Patient will demonstrate bathing while seated EOB with max assist.   Not met  Patient's family / caregiver will demonstrate independence and safety with assisting patient with self-care skills and functional mobility.     Not met  Patient's family / caregiver will demonstrate independence with providing ROM and changes in bed positioning.   Not met  Patient and/or patient's family will verbalize understanding of stroke prevention guidelines, personal risk factors and stroke warning signs via teachback method.  Not met                                  History:     Past Medical History:   Diagnosis Date    CAD (coronary artery disease)     CHF (congestive heart failure)     Chronic a-fib     CKD (chronic kidney disease) stage 3, GFR 30-59 ml/min     Diabetes mellitus type 2 in obese     HTN  (hypertension), benign     Hypothyroid     Obesity (BMI 30-39.9)     Seizures     Venous stasis          Past Surgical History:   Procedure Laterality Date    CORONARY ARTERY BYPASS GRAFT      2 vessel    INSERTION OF PACEMAKER  2018    RENAL BIOPSY      TONSILLECTOMY         Time Tracking:     OT Date of Treatment: 11/15/22  OT Start Time: 0335  OT Stop Time: 0353  OT Total Time (min): 18 min    Billable Minutes:Evaluation 10  Neuromuscular Re-education 8    11/15/2022

## 2022-11-15 NOTE — NURSING
MRI ordered. Pt cleared for MRI by [employer] (for pacemaker). Per NCC team, pt to remain on cEEG for 24 hours.  Once cEEG has a reading, MRI is to be completed.

## 2022-11-16 PROBLEM — B95.61 MSSA BACTEREMIA: Status: ACTIVE | Noted: 2022-01-01

## 2022-11-16 PROBLEM — Z71.89 ADVANCE CARE PLANNING: Status: ACTIVE | Noted: 2022-01-01

## 2022-11-16 PROBLEM — Z51.5 PALLIATIVE CARE ENCOUNTER: Status: ACTIVE | Noted: 2022-01-01

## 2022-11-16 NOTE — PROCEDURES
EEG Extended Monitoring greater than one hour    Date/Time: 11/14/2022 9:47 PM  Performed by: Aron Newsome MD  Authorized by: Crow Bender NP       ICU EEG/VIDEO MONITORING REPORT    DATE OF SERVICE: 11/15/22-11/16/22  EEG NUMBER: [id number]   REQUESTED BY: Napoleon  [place] [facility] [place]OLOGY   Electroencephalographic (EEG) recording is with electrodes placed according to the International 10-20 placement system.  Thirty two (32) channels of digital signal are simultaneously recorded from the scalp and may include EKG, EMG, and/or eye monitors.   Recording band pass was 0.1 to 512 hz.  Digital video recording of the patient is simultaneously recorded with the EEG.  The nursing staff report clinical symptoms and may press an event button when the patient has symptoms of clinical interest to the treating physicians.  EEG and video recording is stored and archived in digital format.  The entire recording is visually reviewed and the times identified by computer analysis as being spikes or seizures are reviewed again.  Activation procedures which include photic stimulation, hyperventilation and instructing patients to perform simple task are done in selected patients.   Compresses spectral analysis (CSA) is also performed on the activity recorded from each individual channel.  This is displayed as a power display of frequencies from 0 to 30 Hz over time.   The CSA analysis is done and displayed continuously.  This is reviewed for asymmetries in power between homologous areas of the scalp and for presence of changes in power which canbe seen when seizures occur.  Sections of suspected abnormalities on the CSA is then compared with the original EEG recording.     [employer] software was also utilized in the review of this study.  This software suite analyzes the EEG recording in multiple domains.  Coherence and rhythmicity is computed to identify EEG sections which may contain organized seizures.  Each  channel undergoes analysis to detect presence of spike and sharp waves which have special and morphological characteristic of epileptic activity.  The routine EEG recording is converted from spacial into frequency domain.  This is then displayed comparing homologous areas to identify areas of significant asymmetry.  Algorithm to identify non-cortically generated artifact is used to separate eye movement, EMG and other artifact from the EEG.      Recording Times  Start on 11/15/22 at 23:01:49  Stop on 11/16/22 at 09:37:55  A total of 10 hours of EEG was recorded.    EEG FINDINGS  The record shows a poor organization at rest, consisting of a 6 Hz posterior dominant rhythm with poor reactivity. There is mild bilateral beta activity. There is continuous diffuse delta and theta range background slowing.    A second state is characterized by attenuation of the background, vertex waves, and further bilateral slowing.     Provocative maneuvers including hyperventilation and photic stimulation were not performed.     EKG recording shows a sinus rhythm.    There are three push button clinical events at 11:27 (camera not on patient), 22:09 (no behavior change seen), and 02:57 (mouth movements). No epileptiform discharges or electrographic seizures are seen during these times.    IMPRESSION:  Abnormal study due to moderate to severe  diffuse background slowing consistent with diffuse cerebral dysfunction and encephalopathy which may be on the basis of toxic, metabolic, or primary neuronal disorder.     Aron Newsome MD  Department of Neurology  [facility]               DISPLAY PLAN FREE TEXT DISPLAY PLAN FREE TEXT DISPLAY PLAN FREE TEXT DISPLAY PLAN FREE TEXT DISPLAY PLAN FREE TEXT DISPLAY PLAN FREE TEXT DISPLAY PLAN FREE TEXT DISPLAY PLAN FREE TEXT

## 2022-11-16 NOTE — PLAN OF CARE
[facility] Care Plan    POC reviewed with Enrico Burnett at 0300. Pt unable to verbalize understanding due to intubation. No acute events overnight. Pt progressing toward goals. Will continue to monitor. See below and flowsheets for full assessment and VS info.     No acute neuro changes overnight.  CT head obtained, VSS patient tolerated well.  Critical potassium 2.7, replaced with 40 mEq. Potassium now 3, currently administering 60 mEq.  Tube feeds currently at 40 mL/hr, goal 55.  UO ~1000 mL for shift.   Partial bath given, sheets changed.  Patient now localizing to pain.  2 red button deploys for slight rhythmic motions of legs and then face, per epilepsy not seizure activity.   Bladder scanned x 1 after voiding, no retention noted.         Is this a stroke patient? yes- Stroke booklet reviewed with patient, risk factors identified for patient and stroke booklet remains at bedside for ongoing education.     Neuro:  [place] Coma Scale  Best Eye Response: 1-->(E1) none  Best Motor Response: 4-->(M4) withdraws from pain  Best Verbal Response: 1-->(V1) none  [place] Coma Scale Score: 6  Assessment Qualifiers: patient intubated  Pupil PERRLA: yes     24hr Temp:  [96.4 °F (35.8 °C)-100.2 °F (37.9 °C)]     CV:   Rhythm: paced rhythm (Pacemaker in place)  BP goals:   SBP < 160  MAP > 65    Resp:   O2 Device (Oxygen Therapy): ventilator  Vent Mode: A/C  Set Rate: 18 BPM  Oxygen Concentration (%): 50  Vt Set: 460 mL  PEEP/CPAP: 8 cmH20    Plan: wean to extubate    GI/:     Diet/Nutrition Received: tube feeding  Last Bowel Movement: 11/15/22  Voiding Characteristics: external catheter, incontinence    Intake/Output Summary (Last 24 hours) at 11/16/2022 0602  Last data filed at 11/16/2022 0502  Gross per 24 hour   Intake 951.1 ml   Output 1940 ml   Net -988.9 ml     Unmeasured Output  Urine Occurrence: (P) 1  Stool Occurrence: 1  Pad Count: (P) 1    Labs/Accuchecks:  Recent Labs   Lab 11/16/22  0301   WBC 13.64*   RBC 4.43*    HGB 13.9*   HCT 46.5         Recent Labs   Lab 11/16/22  0301   *   K 3.0*   CO2 36*      BUN 72*   CREATININE 1.6*   ALKPHOS 94   ALT 25   AST 72*   BILITOT 2.6*      Recent Labs   Lab 11/15/22  0255   INR 1.3*   APTT 26.1      Recent Labs   Lab 11/15/22  0255   *   CPKMB 1.5   TROPONINI 0.205*   MB 0.2       Electrolytes: Electrolytes replaced  Accuchecks: Q4H    Gtts:   dextrose 10 % in water (D10W)      fentanyl Stopped (11/15/22 0753)    midazolam Stopped (11/15/22 1337)    NORepinephrine bitartrate-D5W         LDA/Wounds:  Lines/Drains/Airways       Central Venous Catheter Line  Duration             Percutaneous Central Line Insertion/Assessment - Triple Lumen  right internal jugular -- days              Drain  Duration                  NG/OG Tube Center mouth -- days    Male External Urinary Catheter 11/15/22 1100 Small <1 day              Airway  Duration                  Airway - Non-Surgical -- days              Peripheral Intravenous Line  Duration                  Midline Catheter Insertion/Assessment  - Single Lumen Left cephalic vein (lateral side of arm) -- days         Midline Catheter Insertion/Assessment  - Single Lumen Right basilic vein (medial side of arm) -- days                  Wounds: Yes  Wound care consulted: Yes

## 2022-11-16 NOTE — ASSESSMENT & PLAN NOTE
68 yo male with PMH of Afib on coumadin with elevated INR and mentation change prompting intubation found to have multifocal small parenchymal ICH on rough OSH scan.    --Admitted to [facility]  --Q1 hour checks  --Wean sedation, WTE as tolerated  --EEG, AEDs per NCC  --Repeat CT head stable cortical and subcortical hemorrhages  --SBP < 160  --Continue to monitor INR/PT and reverse as needed for goal INR < 1.4.   --HOB >30  --Ongoing goals of care discussion: DNR status. pending family arrival.   --Continue to monitor clinically, we will sign off, please call with any questions.     D/w staff, Dr. Quiroz.

## 2022-11-16 NOTE — PLAN OF CARE
[facility] Care Plan    POC reviewed with Enrico Burnett and family at 1400. Pt verbalized understanding. Questions and concerns addressed. No acute events today. Pt progressing toward goals. Will continue to monitor. See below and flowsheets for full assessment and VS info.     -Family aware pending MRI  -Dr. Ferraro and team attempting to coordinate with MRI radiology and xray      Is this a stroke patient? yes- Stroke booklet reviewed with patient and family, risk factors identified for patient and stroke booklet remains at bedside for ongoing education.     Neuro:  [place] Coma Scale  Best Eye Response: 1-->(E1) none  Best Motor Response: 5-->(M5) localizes pain  Best Verbal Response: 1-->(V1) none  [place] Coma Scale Score: 7  Assessment Qualifiers: patient intubated  Pupil PERRLA: yes     24 hr Temp:  [97.2 °F (36.2 °C)-100.8 °F (38.2 °C)]     CV:   Rhythm: sinus arrhythmia, paced rhythm (pacemaker)  BP goals:   SBP < 160  MAP > 65    Resp:   O2 Device (Oxygen Therapy): ventilator  Vent Mode: SIMV  Set Rate: 16 BPM  Oxygen Concentration (%): 40  Vt Set: 460 mL  PEEP/CPAP: 8 cmH20  Pressure Support: 10 cmH20    Plan: wean to extubate    GI/:     Diet/Nutrition Received: tube feeding  Last Bowel Movement: 11/15/22  Voiding Characteristics: external catheter    Intake/Output Summary (Last 24 hours) at 11/16/2022 1729  Last data filed at 11/16/2022 1701  Gross per 24 hour   Intake 1642.82 ml   Output 1380 ml   Net 262.82 ml     Unmeasured Output  Urine Occurrence: (P) 1  Stool Occurrence: 1  Pad Count: 1    Labs/Accuchecks:  Recent Labs   Lab 11/16/22  0301   WBC 13.64*   RBC 4.43*   HGB 13.9*   HCT 46.5         Recent Labs   Lab 11/16/22  0301 11/16/22  1011 11/16/22  1614   *   < > 149*   K 3.0*   < > 3.0*   CO2 36*   < > 35*      < > 101   BUN 72*   < > 81*   CREATININE 1.6*   < > 1.8*   ALKPHOS 94  --   --    ALT 25  --   --    AST 72*  --   --    BILITOT 2.6*  --   --     < > = values in this  interval not displayed.      Recent Labs   Lab 11/15/22  0255   INR 1.3*   APTT 26.1      Recent Labs   Lab 11/15/22  0255   *   CPKMB 1.5   TROPONINI 0.205*   MB 0.2       Electrolytes: Electrolytes replaced  Accuchecks: Q4H    Gtts:   dextrose 10 % in water (D10W)      NORepinephrine bitartrate-D5W         LDA/Wounds:  Lines/Drains/Airways       Central Venous Catheter Line  Duration             Percutaneous Central Line Insertion/Assessment - Triple Lumen  right internal jugular -- days              Drain  Duration                  NG/OG Tube Center mouth -- days    Male External Urinary Catheter 11/15/22 1100 Small 1 day              Airway  Duration                  Airway - Non-Surgical -- days              Peripheral Intravenous Line  Duration                  Midline Catheter Insertion/Assessment  - Single Lumen Left cephalic vein (lateral side of arm) -- days         Midline Catheter Insertion/Assessment  - Single Lumen Right basilic vein (medial side of arm) -- days         Peripheral IV - Single Lumen 11/15/22 2300 20 G Anterior;Distal;Right Upper Arm <1 day                  Wounds: Yes  Wound care consulted: No

## 2022-11-16 NOTE — HPI
Pt is a 66 y/o male, transferred for eval and management for SAH. No notes sent with patient, unable to get exact events/timeline. It appears he was being treated for CHF, and had seizure acitivy over the weekend requiring intuabtion. CTH showed diffuse SAH, CTA read as no Aneurysm/avm. INR was supratheraputic a few days ago > 4, but had decreased to 2's last few days,. No reports of trauma, possible spontaneuous in setting of supratheraputic INR. PCC and Vit K given for elevated INR. DDAVP given for ASA. Transferred to [facility] for further eval and management.         No past medical history on file.  No past surgical history on file.   No current facility-administered medications on file prior to encounter.      No current outpatient medications on file prior to encounter.      Pt is on Vent, EEG in process.   Partial code.

## 2022-11-16 NOTE — PROGRESS NOTES
[facility] - Neuro Critical Care  Neurosurgery  Progress Note    Subjective:     History of Present Illness: Enrico Burnett is a 66 yo male with known PMH of Afib who is transferred to neuro ICU critically ill after prolonged hospital stay at outside facility with concern for multiple parenchymal ICH in the setting of supratherapeutic INR.  Mr. Burnett per report had an INR of 4 at OSH and had mental decline prompting intubation.  He has had metabolic alkalosis and hypotension and is now on levo and EEG.  NSGY consulted due to small diffuse R>L parietal and frontal multifocal hemorrhages. Given Kcentra, DDAVP and Vit K at OSH For Coumadin and ASA use.       Post-Op Info:  * No surgery found *         Interval History: 11/16: NAEON. Patient made DNR.     Medications:  Continuous Infusions:   dextrose 10 % in water (D10W)      fentanyl Stopped (11/15/22 0753)    midazolam Stopped (11/15/22 1337)    NORepinephrine bitartrate-D5W       Scheduled Meds:   acetaZOLAMIDE  500 mg Intravenous Q12H    amiodarone  200 mg Per NG tube Daily    famotidine  20 mg Per OG tube BID    levetiracetam IV  1,000 mg Intravenous Q12H    mupirocin   Nasal BID    piperacillin-tazobactam (ZOSYN) IVPB  4.5 g Intravenous Q8H    senna-docusate 8.6-50 mg  1 tablet Per OG tube BID     PRN Meds:acetaminophen, albuterol-ipratropium, dextrose 10 % in water (D10W), dextrose 10%, dextrose 10%, glucagon (human recombinant), glucose, glucose, insulin aspart U-100, labetalol, ondansetron, sodium chloride 0.9%, Pharmacy to dose Vancomycin consult **AND** vancomycin - pharmacy to dose     Review of Systems  Objective:     Weight: 100 kg (220 lb 7.4 oz)  Body mass index is 29.9 kg/m².  Vital Signs (Most Recent):  Temp: 100.2 °F (37.9 °C) (11/16/22 0602)  Pulse: 95 (11/16/22 0602)  Resp: 19 (11/16/22 0602)  BP: (!) 128/58 (11/16/22 0602)  SpO2: 100 % (11/16/22 0602)   Vital Signs (24h Range):  Temp:  [96.4 °F (35.8 °C)-100.2 °F (37.9 °C)] 100.2 °F (37.9  °C)  Pulse:  [] 95  Resp:  [9-30] 19  SpO2:  [99 %-100 %] 100 %  BP: (102-142)/(58-85) 128/58                Vent Mode: A/C  Oxygen Concentration (%):  [50] 50  Resp Rate Total:  [18 br/min-29 br/min] 18 br/min  Vt Set:  [460 mL] 460 mL  PEEP/CPAP:  [5 cmH20-8 cmH20] 8 cmH20  Mean Airway Pressure:  [9.7 cmH20-15 cmH20] 12 cmH20         NG/OG Tube Center mouth (Active)   Placement Check placement verified by x-ray;placement verified by distal tube length measurement 11/16/22 0302   Tolerance no signs/symptoms of discomfort 11/16/22 0302   Securement secured to commercial device 11/16/22 0302   Clamp Status/Tolerance unclamped;no emesis;no restlessness 11/16/22 0302   Suction Setting/Drainage Method suction at the bedside 11/16/22 0302   Insertion Site Appearance no redness, warmth, tenderness, skin breakdown, drainage 11/16/22 0302   Drainage None 11/16/22 0302   Flush/Irrigation flushed w/;water;no resistance met 11/16/22 0302   Feeding Type continuous;by pump 11/16/22 0302   Feeding Action feeding continued 11/16/22 0302   Current Rate (mL/hr) 20 mL/hr 11/15/22 1902   Goal Rate (mL/hr) 55 mL/hr 11/15/22 1902   Intake (mL) 50 mL 11/16/22 0502   Rate Formula Tube Feeding (mL/hr) 10 mL/hr 11/15/22 1501   Formula Name Impact Peptide 11/16/22 0302   Intake (mL) - Formula Tube Feeding 40 11/16/22 0602   Residual Amount (ml) 90 ml 11/16/22 0502       Male External Urinary Catheter 11/15/22 1100 Small (Active)   Collection Container Urimeter 11/16/22 0302   Securement Method secured to top of thigh w/ adhesive device 11/16/22 0302   Skin no redness;no breakdown 11/16/22 0302   Tolerance no signs/symptoms of discomfort 11/16/22 0302   Output (mL) 525 mL 11/16/22 0502   Catheter Change Date 11/15/22 11/16/22 0302   Catheter Change Time 2202 11/16/22 0302       Physical Exam    Neurosurgery Physical Exam  [id number]  PERRL  +OC  W/d x 4  Moving L>R extremities.       Significant Labs:  Recent Labs   Lab 11/15/22  5706  11/15/22  1028 11/15/22  1830 11/16/22  0301   *  --  157* 175*   *  --  152* 148*   K 2.4* 2.6* 2.7* 3.0*   CL 90*  --  98 100   CO2 42*  --  38* 36*   BUN 68*  --  67* 72*   CREATININE 1.8*  --  1.4 1.6*   CALCIUM 8.6*  --  8.9 8.9   MG 2.1  --   --  2.2     Recent Labs   Lab 11/15/22  0255 11/16/22  0301   WBC 9.33 13.64*   HGB 13.7* 13.9*   HCT 45.5 46.5    169     Recent Labs   Lab 11/15/22  0255   INR 1.3*   APTT 26.1     Microbiology Results (last 7 days)       Procedure Component Value Units Date/Time    Blood culture [[id number]] Collected: 11/15/22 0019    Order Status: Completed Specimen: Blood from Peripheral, Forearm, Left Updated: 11/16/22 0613     Blood Culture, Routine No Growth to date      No Growth to date    Narrative:      Blood cultures x 2 different sites. 4 bottles total. Please  draw cultures before administering antibiotics.    Blood culture [[id number]] Collected: 11/15/22 0018    Order Status: Completed Specimen: Blood from Peripheral, Lower Leg, Right Updated: 11/16/22 0613     Blood Culture, Routine No Growth to date      No Growth to date    Narrative:      Blood cultures from 2 different sites. 4 bottles total.  Please draw before starting antibiotics.    Culture, Respiratory with Gram Stain [[id number]] Collected: 11/15/22 0954    Order Status: Completed Specimen: Respiratory from Endotracheal Aspirate Updated: 11/15/22 1446     Gram Stain (Respiratory) <10 epithelial cells per low power field.     Gram Stain (Respiratory) Rare  WBC's     Gram Stain (Respiratory) Rare yeast    Narrative:      Mini-BAL.          All pertinent labs from the last 24 hours have been reviewed.    Significant Diagnostics:  I have reviewed all pertinent imaging results/findings within the past 24 hours.    Assessment/Plan:     * SAH (subarachnoid hemorrhage)  68 yo male with PMH of Afib on coumadin with elevated INR and mentation change prompting intubation found to have multifocal small  parenchymal ICH on rough OSH scan.    --Admitted to [facility]  --Q1 hour checks  --Wean sedation, WTE as tolerated  --EEG, AEDs per [facility]  --Repeat CT head stable cortical and subcortical hemorrhages  --SBP < 160  --Continue to monitor INR/PT and reverse as needed for goal INR < 1.4.   --HOB >30  --Ongoing goals of care discussion: DNR status. pending family arrival.   --Continue to monitor clinically, we will sign off, please call with any questions.     D/w staff, Dr. Quiroz.         Benedict Garsia MD  Neurosurgery  [facility] - Neuro Critical Care

## 2022-11-16 NOTE — PROGRESS NOTES
Epilepsy Team    CC: EEG placed for concern for epileptiform activity/seizures    HPI: 67 year old male with a PMHx of paroxysmal atrial fibrillation on Coumadin, HFrEF, s/p AICD, DM2, CKD, and peripheral vascular disease who was transferred from [facility] to [facility] [place]14 for higher level of care and admitted to [facility] for further management of SAH. HPI per chart review as patient intubated and sedated. Per chart, patient was admitted at OSH 11/5 for CHF exacerbation, hospital course complicated by afib RVR, failed DCCV, encephalopathy, staph aureus bacteremia, seizure like activity (some concern for convulsive syncope), intubation for airway protection, supratherapeutic INR, and diffuse SAH. EEG CAP placed 11/14.     Unable to obtain patient's family and social history due to patient intubated.     Patient is currently maintained on Levetiracetam 1g BID.      Review of systems:  Not performed secondary to patient intubated.    Physical Exam  General: Intubated, afebrile, NAD.  HEENT: Normocephalic. Atraumatic. EEG in place.   Pulmonary: Effort normal, no respiratory distress.  Cardiac: Normal rate.   Abdominal: Soft.  Skin: No jaundice. Numerous wounds to LLE.  Psych: Unable to assess.   Neuro:  DOT OU   Corneal intact OU   No spontaneous eye opening   Face symmetric   Tongue midline   Withdraws to noxious stimuli in BLE    Exam findings to suggest seizures:  Myoclonus - no   eye twitching - no   Nystagmus - no   gaze deviation - no   waxy rigidity - no      EEG reviewed by Epileptologist, findings include 3 PBs with no EEG correlate, moderate to severe diffuse background slowing; see EEG report for full details.     Encephalopathy  Recommendations: Encephalopathy is nonspecific in regards to etiology. No indication for escalation of anti-seizure drug at this time. [facility] planning for MRI today. We will remove EEG today and sign off. Findings and recommendations discussed with primary team.    SAH  - CTH 11/15 with  extensive SAH and IVH  - Management per NSGY, NCC     Acute respiratory failure with hypoxia and hypercapnia   - Arrived intubated from OSH   - Vent management per NCC     ROSEMARIE on CKD  - Cr 1.6 today  - Management per NCC     Bacteremia  - Reported at OSH  - On Zosyn  - Repeat blood cx NGTD  - Management per NCC      Siobhan Del Toro PA-C  Neurology Epilepsy  Staff: Dr. Newsome

## 2022-11-16 NOTE — CONSULTS
"[facility] Critical Care  Infectious Disease  Consult Note    Patient Name: Enrico Burnett  MRN: [id number]  Admission Date: 11/14/2022  Hospital Length of Stay: 2 days  Attending Physician: Klaus Ferraro DO  Primary Care Provider: Mark Rocha MD     Isolation Status: No active isolations    Patient information was obtained from past medical records and ER records.      Inpatient consult to Infectious Diseases  Consult performed by: Alison Gaona MD  Consult ordered by: Abby Leyva MD        Assessment/Plan:     MSSA bacteremia  MSSA bacteremia complicating hospital course at [facility]. Unclear source of bacteremia from documentation available via EMR. Blood cultures here at [facility] remain NGTD.   · I favor transitioning from Zosyn/vancomycin to oxacillin by continuous infusion.  · Will follow up cultures.  · Awaiting goals of care.  · Will defer further studies while awaiting goals of care discussions.       Thank you for your consult. I will follow-up with patient. Please contact us if you have any additional questions.    Alison Gaona MD  Infectious Disease  [facility] Critical Care    Subjective:     Principal Problem: SAH (subarachnoid hemorrhage)    HPI: A 67-year-old man with CAD-MI, HF, HTN, DM2, diverticulosis who was admitted to [facility] after recurrent falls, "worsening respiratory status", and edema. His hospital course was complicated by Afb with rvr, seizure like activity requiring intubation for airway protection, SAH, and MSSA bacteremia (11/11). He was transferred to [facility] for further management of SAH. On transfer he ws started on cefepime and vancomycin.    Infectious Diseases consulted for "Staph bacteremia in critically ill patient"          Past Medical History:   Diagnosis Date    CAD (coronary artery disease)     CHF (congestive heart failure)     Chronic a-fib     CKD (chronic kidney disease) stage 3, GFR 30-59 ml/min     " Diabetes mellitus type 2 in obese     HTN (hypertension), benign     Hypothyroid     Obesity (BMI 30-39.9)     Seizures     Venous stasis        Past Surgical History:   Procedure Laterality Date    CORONARY ARTERY BYPASS GRAFT      2 vessel    INSERTION OF PACEMAKER  2018    RENAL BIOPSY      TONSILLECTOMY         Review of patient's allergies indicates:  Not on File    Medications:  Medications Prior to Admission   Medication Sig    amiodarone (PACERONE) 200 MG Tab Take 200 mg by mouth.    empagliflozin (JARDIANCE) 10 mg tablet Take 10 mg by mouth.    aspirin (ECOTRIN) 81 MG EC tablet Take 81 mg by mouth once daily.    furosemide (LASIX) 80 MG tablet Take 80 mg by mouth every morning.    metoprolol succinate (TOPROL-XL) 100 MG 24 hr tablet Take 100 mg by mouth once daily.    pravastatin (PRAVACHOL) 40 MG tablet Take 40 mg by mouth once daily.    sacubitriL-valsartan (ENTRESTO) 49-51 mg per tablet Entresto 49 mg-51 mg tablet    warfarin (COUMADIN) 5 MG tablet Take 5 mg by mouth.     Antibiotics (From admission, onward)      Start     Stop Route Frequency Ordered    11/15/22 0900  mupirocin 2 % ointment          0859 Nasl 2 times daily 11/15/22 0507    11/15/22 0100  piperacillin-tazobactam 4.5 g in sodium chloride 0.9% 100 mL IVPB (ready to mix system)         -- IV Every 8 hours (non-standard times) 22 2355    11/15/22 0052  vancomycin - pharmacy to dose  (vancomycin IVPB)        See Hyperspace for full Linked Orders Report.    -- IV pharmacy to manage frequency 22 2352          Antifungals (From admission, onward)      None          Antivirals (From admission, onward)      None               There is no immunization history on file for this patient.    Family History    None       Social History     Socioeconomic History    Marital status: Unknown   Tobacco Use    Smoking status: Former     Types: Cigarettes     Quit date:      Years since quittin.8    Smokeless  tobacco: Never   Substance and Sexual Activity    Alcohol use: Not Currently    Drug use: Never     Review of Systems   Unable to perform ROS: Intubated   Objective:     Vital Signs (Most Recent):  Temp: 97.7 °F (36.5 °C) (11/16/22 1801)  Pulse: 98 (11/16/22 1801)  Resp: (!) 25 (11/16/22 1801)  BP: 90/60 (11/16/22 1801)  SpO2: 100 % (11/16/22 1801)   Vital Signs (24h Range):  Temp:  [97.7 °F (36.5 °C)-100.8 °F (38.2 °C)] 97.7 °F (36.5 °C)  Pulse:  [] 98  Resp:  [5-26] 25  SpO2:  [99 %-100 %] 100 %  BP: ()/(57-85) 90/60     Weight: 100 kg (220 lb 7.4 oz)  Body mass index is 29.9 kg/m².    Estimated Creatinine Clearance: 50.1 mL/min (A) (based on SCr of 1.8 mg/dL (H)).    Physical Exam  Vitals reviewed.   Constitutional:       Appearance: He is well-developed.      Interventions: He is intubated.   HENT:      Head: Normocephalic and atraumatic.      Right Ear: External ear normal.      Left Ear: External ear normal.   Eyes:      Conjunctiva/sclera: Conjunctivae normal.   Neck:      Thyroid: No thyromegaly.   Cardiovascular:      Rate and Rhythm: Normal rate and regular rhythm.      Heart sounds: Normal heart sounds. No murmur heard.  Pulmonary:      Effort: Pulmonary effort is normal. He is intubated.      Breath sounds: Normal breath sounds. No wheezing or rales.   Abdominal:      General: Bowel sounds are normal.      Palpations: Abdomen is soft. There is no mass.      Tenderness: There is no abdominal tenderness. There is no rebound.   Musculoskeletal:      Cervical back: Normal range of motion and neck supple.   Lymphadenopathy:      Cervical: No cervical adenopathy.   Skin:     General: Skin is warm and dry.   Neurological:      Mental Status: He is lethargic.      Comments: Does not respond to verbal or tactile stimuli       Significant Labs: Blood Culture:   Recent Labs   Lab 11/15/22  0018 11/15/22  0019   LABBLOO No Growth to date  No Growth to date No Growth to date  No Growth to date      BMP:   Recent Labs   Lab 11/16/22  1614   *   *   K 3.0*      CO2 35*   BUN 81*   CREATININE 1.8*   CALCIUM 8.4*   MG 2.4     CBC:   Recent Labs   Lab 11/15/22  0255 11/16/22  0301   WBC 9.33 13.64*   HGB 13.7* 13.9*   HCT 45.5 46.5    169     Respiratory Culture:   Recent Labs   Lab 11/15/22  0954   GSRESP <10 epithelial cells per low power field.  Rare  WBC's  Rare yeast   RESPIRATORYC Normal respiratory hakeem     Urine Culture: No results for input(s): LABURIN in the last 4320 hours.  Urine Studies:   Recent Labs   Lab 11/10/22  2021 11/15/22  0141   COLORU  --  Yellow   APPEARANCEUA  --  Clear   PHUR  --  8.0   SPECGRAV  --  1.015   PROTEINUA  --  Trace*   GLUCUA  --  3+*   KETONESU  --  Negative   BILIRUBINUA  --  Negative   OCCULTUA  --  3+*   NITRITE  --  Negative   UROBILINOGEN Normal  --    LEUKOCYTESUR Trace* 3+*   RBCUA 6* >100*   WBCUA 6* 2   BACTERIA None Seen Occasional   SQUAMEPITHEL  --  1   HYALINECASTS  --  1     Wound Culture: No results for input(s): LABAERO in the last 4320 hours.    Significant Imaging: I have reviewed all pertinent imaging results/findings within the past 24 hours.

## 2022-11-16 NOTE — PROGRESS NOTES
"Pharmacy Kinetics 72 y.o. female on vancomycin day # 3 2018    Currently on Vancomycin 1400 mg IV q24h (0100)    Indication for Treatment: staph epi bacteremia / infected port    Pertinent history per medical record: Admitted on 2018 for port infection. Patient with implanted port for weekly Prolastin infusions for her alpha-1 antitrypsin deficiency. She is transferred from OSH where she presented with one day h/o SOB. She was admitted and treated for COPD/PNA. Her blood cx subsequently grew staph epi which was thought to be due to her port. Removal was felt to be too complicated and patient was transferred to [facility] for vascular consult.    Other antibiotics: none    Allergies: Patient has no known allergies.     List concerns for renal function: age, malnutrition/low albumin, fluid overload/lasix therapy    Pertinent cultures to date:   9/15/18: Blood, peripheral x2 = NGTD  9/15/18: subclavian cath tip = NGTD    Recent Labs      09/15/18   0345  18   0434  18   0525   WBC  19.7*  18.5*  22.6*   NEUTSPOLYS  70.80  92.30*  90.50*     Recent Labs      09/15/18   0345  18   0434  18   0525   BUN  18  12  15   CREATININE  0.40*  0.38*  0.35*   ALBUMIN  3.3   --   3.1*     No results for input(s): VANCOTROUGH, VANCOPEAK, VANCORANDOM in the last 72 hours.  Intake/Output Summary (Last 24 hours) at 18 1225  Last data filed at 18 0600   Gross per 24 hour   Intake             1080 ml   Output             1500 ml   Net             -420 ml      Blood pressure 119/56, pulse 61, temperature 36.4 °C (97.6 °F), resp. rate (!) 27, height 1.6 m (5' 2.99\"), weight 74 kg (163 lb 2.3 oz), SpO2 90 %, not currently breastfeeding. Temp (24hrs), Av.7 °C (98 °F), Min:36 °C (96.8 °F), Max:37.6 °C (99.6 °F)  A/P   1. Vancomycin dose change: Not indicated at this time  2. Next vancomycin level:  Ordered before the next dose (level at 0030, dose at 0100 on )  3. Goal trough: ~16 " Pharmacokinetic Assessment: IV Vancomycin    Vancomycin serum concentration assessment(s):    - Random level is therapeutic at 17.8 mcg/mL  - Goal trough  15-20  mcg/mL  - ROSEMARIE, worsened overnight. Good UOP over last 24 hours  -  Continue pulse dose as needed. Give 1000 mg x1 now  - Collect random level on 11/17 at 1200. Redose based on level and renal function    Drug levels (last 3 results):  Recent Labs   Lab Result Units 11/15/22  0026 11/15/22  0748 11/16/22  0301   Vancomycin, Random ug/mL 31.1 25.5 17.8       Pharmacy will continue to follow and monitor vancomycin.    Please contact pharmacy at extension [phone] for questions regarding this assessment.      Anuradha Garcia, PharmD, BCCCP  Neurocritical Care Clinical Pharmacist  Ext. [phone]           Patient brief summary:  Enrico Burnett is a 67 y.o. male initiated on antimicrobial therapy with IV Vancomycin for treatment of bacteremia      Drug Allergies:   Review of patient's allergies indicates:  Not on File      Renal Function:   Estimated Creatinine Clearance: 56.4 mL/min (A) (based on SCr of 1.6 mg/dL (H)).,     Dialysis Method (if applicable):  N/A       mcg/mL  4. Comments: [facility] seeing patient. Renal stable. Plan for KRISSY today to r/o endocarditis as TTE had some mitral valve thickening.  Assess trough level tonight.  Duration of therapy to be determined following KRISSY results.    Thank you!  Whitney Acosta, PharmD

## 2022-11-16 NOTE — SUBJECTIVE & OBJECTIVE
Interval History: 11/16: NAEON. Patient made DNR.     Medications:  Continuous Infusions:   dextrose 10 % in water (D10W)      fentanyl Stopped (11/15/22 0753)    midazolam Stopped (11/15/22 1337)    NORepinephrine bitartrate-D5W       Scheduled Meds:   acetaZOLAMIDE  500 mg Intravenous Q12H    amiodarone  200 mg Per NG tube Daily    famotidine  20 mg Per OG tube BID    levetiracetam IV  1,000 mg Intravenous Q12H    mupirocin   Nasal BID    piperacillin-tazobactam (ZOSYN) IVPB  4.5 g Intravenous Q8H    senna-docusate 8.6-50 mg  1 tablet Per OG tube BID     PRN Meds:acetaminophen, albuterol-ipratropium, dextrose 10 % in water (D10W), dextrose 10%, dextrose 10%, glucagon (human recombinant), glucose, glucose, insulin aspart U-100, labetalol, ondansetron, sodium chloride 0.9%, Pharmacy to dose Vancomycin consult **AND** vancomycin - pharmacy to dose     Review of Systems  Objective:     Weight: 100 kg (220 lb 7.4 oz)  Body mass index is 29.9 kg/m².  Vital Signs (Most Recent):  Temp: 100.2 °F (37.9 °C) (11/16/22 0602)  Pulse: 95 (11/16/22 0602)  Resp: 19 (11/16/22 0602)  BP: (!) 128/58 (11/16/22 0602)  SpO2: 100 % (11/16/22 0602)   Vital Signs (24h Range):  Temp:  [96.4 °F (35.8 °C)-100.2 °F (37.9 °C)] 100.2 °F (37.9 °C)  Pulse:  [] 95  Resp:  [9-30] 19  SpO2:  [99 %-100 %] 100 %  BP: (102-142)/(58-85) 128/58                Vent Mode: A/C  Oxygen Concentration (%):  [50] 50  Resp Rate Total:  [18 br/min-29 br/min] 18 br/min  Vt Set:  [460 mL] 460 mL  PEEP/CPAP:  [5 cmH20-8 cmH20] 8 cmH20  Mean Airway Pressure:  [9.7 cmH20-15 cmH20] 12 cmH20         NG/OG Tube Center mouth (Active)   Placement Check placement verified by x-ray;placement verified by distal tube length measurement 11/16/22 0302   Tolerance no signs/symptoms of discomfort 11/16/22 0302   Securement secured to commercial device 11/16/22 0302   Clamp Status/Tolerance unclamped;no emesis;no restlessness 11/16/22 0302   Suction Setting/Drainage Method  suction at the bedside 11/16/22 0302   Insertion Site Appearance no redness, warmth, tenderness, skin breakdown, drainage 11/16/22 0302   Drainage None 11/16/22 0302   Flush/Irrigation flushed w/;water;no resistance met 11/16/22 0302   Feeding Type continuous;by pump 11/16/22 0302   Feeding Action feeding continued 11/16/22 0302   Current Rate (mL/hr) 20 mL/hr 11/15/22 1902   Goal Rate (mL/hr) 55 mL/hr 11/15/22 1902   Intake (mL) 50 mL 11/16/22 0502   Rate Formula Tube Feeding (mL/hr) 10 mL/hr 11/15/22 1501   Formula Name Impact Peptide 11/16/22 0302   Intake (mL) - Formula Tube Feeding 40 11/16/22 0602   Residual Amount (ml) 90 ml 11/16/22 0502       Male External Urinary Catheter 11/15/22 1100 Small (Active)   Collection Container Urimeter 11/16/22 0302   Securement Method secured to top of thigh w/ adhesive device 11/16/22 0302   Skin no redness;no breakdown 11/16/22 0302   Tolerance no signs/symptoms of discomfort 11/16/22 0302   Output (mL) 525 mL 11/16/22 0502   Catheter Change Date 11/15/22 11/16/22 0302   Catheter Change Time 2202 11/16/22 0302       Physical Exam    Neurosurgery Physical Exam  [id number]  PERRL  +OC  W/d x 4  Moving L>R extremities.       Significant Labs:  Recent Labs   Lab 11/15/22  0255 11/15/22  1028 11/15/22  1830 11/16/22  0301   *  --  157* 175*   *  --  152* 148*   K 2.4* 2.6* 2.7* 3.0*   CL 90*  --  98 100   CO2 42*  --  38* 36*   BUN 68*  --  67* 72*   CREATININE 1.8*  --  1.4 1.6*   CALCIUM 8.6*  --  8.9 8.9   MG 2.1  --   --  2.2     Recent Labs   Lab 11/15/22  0255 11/16/22  0301   WBC 9.33 13.64*   HGB 13.7* 13.9*   HCT 45.5 46.5    169     Recent Labs   Lab 11/15/22  0255   INR 1.3*   APTT 26.1     Microbiology Results (last 7 days)       Procedure Component Value Units Date/Time    Blood culture [[id number]] Collected: 11/15/22 0019    Order Status: Completed Specimen: Blood from Peripheral, Forearm, Left Updated: 11/16/22 0613     Blood Culture, Routine No  Growth to date      No Growth to date    Narrative:      Blood cultures x 2 different sites. 4 bottles total. Please  draw cultures before administering antibiotics.    Blood culture [[id number]] Collected: 11/15/22 0018    Order Status: Completed Specimen: Blood from Peripheral, Lower Leg, Right Updated: 11/16/22 0613     Blood Culture, Routine No Growth to date      No Growth to date    Narrative:      Blood cultures from 2 different sites. 4 bottles total.  Please draw before starting antibiotics.    Culture, Respiratory with Gram Stain [[id number]] Collected: 11/15/22 0954    Order Status: Completed Specimen: Respiratory from Endotracheal Aspirate Updated: 11/15/22 1446     Gram Stain (Respiratory) <10 epithelial cells per low power field.     Gram Stain (Respiratory) Rare  WBC's     Gram Stain (Respiratory) Rare yeast    Narrative:      Mini-BAL.          All pertinent labs from the last 24 hours have been reviewed.    Significant Diagnostics:  I have reviewed all pertinent imaging results/findings within the past 24 hours.
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
## 2022-11-16 NOTE — PT/OT/SLP PROGRESS
Physical Therapy      Patient Name:  Enrico Burnett   MRN:  [id number]    Patient not seen today secondary to pt appropriate for only one discipline at this time, OT following pt. Jennifer Arteaga PT 11/16/22

## 2022-11-16 NOTE — PROGRESS NOTES
Received a call from Bautista in the MRI Department in relation to this Inpatient needing to have a MRI and has a [place] [employer] ICD. Please see information below as it relates to patient's Device being MRI compatible/conditional.  To meet protocol the Pacemaker/ICD must have been implanted no less than 6 weeks prior to scheduled date of Scan.  ICD/PPM and leads must be from same .  MRI informed ordering MD must input a Cardiac Device Check in clinic and hospital order, patient must have an xray within 6 months or less prior to MRI reprogramming.  Chest xray to be reviewed by Radiologist.    Dynogen D022 ICD implanted on 2/27/2018.  RA Lead: 7741  RV Lead: 0296  As per [employer]'s IMAGEReady Model look up tool this pt's ICD IS MR-Conditional.     MRI Tech Bautista raised concern about possible other implanted devices near pt's ICD lead.  Needed clarification of this prior to pt being able to have the MRI. As of this afternoon @ 3:50 pm no additional information was received from MRI dept.     Spoke to Phillip MCRAE with [employer] who stated the surgical clips are NOT contraindicated as it relates to Leads. MRI scheduled today ~ 10:00 am.  Rep confirmed availability for this time. Chel in MRI informed of all of the above.

## 2022-11-16 NOTE — ASSESSMENT & PLAN NOTE
Impression: Pt is a 68 y/o male with  with known PMH of Afib who is transferred to neuro ICU critically ill after prolonged hospital stay at outside facility with concern for multiple parenchymal ICH in the setting of supratherapeutic INR.  Pt is on vent. EEG in process. Pt is have MRI today. Pt is a partial code.     Reason for consult: GOC/ACP. Per Neuro MD, family will be coming in for family meeting possibly tomorrow and would like pal care present.     Goals of care/ACP:   No family at bedside. Pt unable to participate in conversation. Pt's wife Irma called per this MARICHUY and Jennifer Castle LCSW. Per Irma she and pt live in [place] with son, Enrico. Pt's other son lives in [place] with his wife who is in the . They are trying to come in tomorrow to visit pt. Per wife, she will not be coming today. She is aware of pt's medical issues and that pt will have MRI today. Per wife, she does not have a mode of transportation and will get rides from [employer] members.  Wife is aware that primary team wants to have a family meeting once son arrives to visit pt.  Per pt's wife, plan if to continue medical management at this time. Awaiting testing results.     Pal care will continue to follow and meet with pt 's wife and family when they visit.     MPOA: Pt's wife Irma Burnett is NOK.   Code status: Partial.       Symptom management:   SAH (subarachnoid hemorrhage)- managed per primary team  Diffuse SAH noted on CTH after concern for seizure  No know trauma, though INR was supra-theraputic.   CTA head read as no concern for Aneurysm/avm  MRI brain without, MRA brain without pending    Family awaiting further testing.      Seizure managed per primary team  Arrived on keppra, unable to view/assess home meds or meds prior to arrival  Cont. Versed gtt, byt wean from 5 to 3mg/hr  Keppra 1gm bid IV  EEG in process     Pulmonary  Acute respiratory failure with hypoxia and hypercapnia- managed per primary team  Arrived on  vent, PAo2 60% with peep 5  Bilateral effusion on CT chest    Pt still on vent.       Pt does not appear to be in distress.     Plan:   Family to visit tomorrow or next day.   Per Neuro MD, they will be having family meeting.  Pal care will meet with family when they visit.

## 2022-11-16 NOTE — PLAN OF CARE
Advance Care Planning   [facility] Critical [facility]  Palliative Care   Psychosocial Assessment    Patient Name: Enrico Burnett  MRN: [id number]  Admission Date: 11/14/2022  Hospital Length of Stay: 2 days  Code Status: Partial Code   Attending Provider: Klaus Ferraro DO  Palliative Care Provider: JOSELITO Matta, APRN, AGCNS  Primary Care Physician: Mark Rocha MD  Principal Problem:SAH (subarachnoid hemorrhage)    Reason for Referral: assistance with clarification of goals of care  Consult Order Date: 11/16/22  Primary CM/SW: Danika Causey RN    Present during Interview:  Spoke with pt's wife by phone.  .      Primary Language:English   Needed: no      Past Medical Situation:   PMH:   Past Medical History:   Diagnosis Date    CAD (coronary artery disease)     CHF (congestive heart failure)     Chronic a-fib     CKD (chronic kidney disease) stage 3, GFR 30-59 ml/min     Diabetes mellitus type 2 in obese     HTN (hypertension), benign     Hypothyroid     Obesity (BMI 30-39.9)     Seizures     Venous stasis      Mental Health/Substance Use History: n/a  Risk of Abuse, neglect or exploitation: none noted  Current or Previous Trauma and/or evidence of PTSD: none noted  Non-traditional Health practices:     Understanding of diagnosis and prognosis: Wife does not appear to understand the severity of pt's illness. Wife would benefit from direct information regarding this information to help get herself and her family here.  Experience/Comfort level with health care system:    Patients Mental Status: vent    Socio-Economic Factors/Resources:  Address: [address] [place] [address]  Phone Number: [phone] (home) [phone] (work)    Marital Status:  x 44.5 years  Household composition: Lives with wife and Son Panfilo  Children: 2 sons: Panfilo and Marino.    Patient/Family perceptions about Caregiving Needs; availability and capacity: get better.  "    Family Structure, Dynamics/Relationships: Wife explained that their main support is her son Panfilo who has been their primary caregiver. Wife stated that Panfilo stepped up to help the pt out when she broke both of her arms at her shoulders earlier this year.    Pt's other son Marino is  and his wife is in the  in [place]. Per wife, Marino is working to have his wife's boss approve the visit.    Wife stated that she has several sisters and a brother who are supportive and they are close with.    Wife stated that she has a strong Taoist family that has "stepped up more than family has".     Patterns/Styles of Communication and Decision-making in the Family: direct communication    Patient/Family Strengths/Resilience: strong johana, wife supportive  Patient/Family Coping Style:  seeking support, adjusting expectations.     Activities of Daily Living: used walker prior to admit. Independent but has declined in last few weeks  Support Systems-Family & Community (Home Health, HME etc): borrowed walker.     Transportation:   trouble with transportation. Wife does not have a reliable car and relies on others to help with transportation.     Work/Education History: retired  Self-Care Activities/Hobbies: enjoys [employer] music and star trek. Likes model trains.      History: noBolivar LEMA is in  stationed in [place]. Offered help with [employer].     Financial Resources:Medicare      Advanced Care Planning & Legal Concerns:   Advanced Directives/Living Will: no  LaPOST/POLST: no   Planning:  no    Medical Power of : no     Oral/Written Declaration: no  Witnesses:   Surrogate Decision Maker: Wife    Emergency Contacts:  Wife: Irma Burnett: [phone]; [phone]  Son: Panfilo Burnett: [phone]    Spirituality, Culture & Coping Mechanisms:  F- Johana and Belief: Unknown/Yazdanism     I - Importance:  strong johana. Large supportive Taoist family.     C - Community/Culture " Values:     A - Address in Care:   visiting.       Goals/Hopes/Expectations: get better  Fears/Anxiety/Concerns: concerned about son getting here        Preferences about EOL Environment: (own bed, family nearby, pets, music, etc)  tbd    Complicated Bereavement Risk Assessment Tool (CBRAT)  Reference:  [facility] Clinical Practice Group (May 2016). Bereavement Risk Screening and Management Guidelines.  Retrieved from: http://www.grpcc.com.au/wp-content/uploads//IVOTI-Oiypbnqmuxw-Wnboumccu-and-Management-Guideline-2016.pdf      Bereaved Client Characteristics   Under 18      no  Was a Twin   no  Young Spouse   no  Elderly Spouse    no  Isolated    no  Lacks Meaningful Social Support   no  Dissatisfied with help available during illness   no  New to [employer] no  New to Decision-Making   no    Illness  Inherited Disorder   no  Stigmatized Disease in the family/community   no  Lengthy/Burdensome   yes     Bereaved Client's History of Loss   Cumulative Multiple Losses   no  Previous Mental Health Illnesses   no  Current Mental Health Illness   no  Other Significant Health Issues   no   Migrant/Refugee   no Will the Death be:  Sudden or Unexpected   yes  Traumatic Circumstances Associated with Death   no  Significant Cultural/Social Burdens as a result of Death   no   Relationship with   Profound Lifelong Partner   yes  Highly Dependent    no  Antagonistic   no  Ambivalent   no  Deeply Connected   yes  Culturally Defined   no   Risk Factors Scores  0-2  Low  3-5  Moderate  5+  High  All persons scoring moderate to high presume to be at risk**    (** It is acknowledged that protective factors and resilience may outweigh apparent risk factors.      Total Risk Factors Score:   Moderate    Will benefit from close follow regarding bereavement support. Wife has strong [employer] community that has shown they have stepped up for her to support.        Discharge Planning Needs/Plan of Care:     Visit to bedside. No family present.    Call placed to pt's wife Irma Burnett ([phone]). Wife stated that she does not have reliable transportation and relies on family and friends to help get her to [facility].    Wife stated that her daughter in law is stationed in [place] and they were awaiting her boss to return to determine when they can come. They were hoping for tomorrow. Offered help with [employer]. Wife stated that they were taking care of this. Provided SW's name and number to follow up to help with family.    Wife stated that she was unsure when family would arrive or when they could get here.    Wife  aware of pt's medical issues and that pt is having a MRI today. Informed wife that a family meeting with the primary team would be beneficial once her family arrives. Wife waiting for test results.    Palliative Care to follow up with wife and family when they visit.    Jennifer Castle, ZAYDA, ACHP-ARELI

## 2022-11-17 NOTE — ASSESSMENT & PLAN NOTE
Arrived on vent, PAo2 60% with peep 5. Bilateral effusion on CT chest  Assess and wean fiO2 as able  PCO2 > 50, but bicarb is extremely elevated (contractin alkalosis from Aggressive diureses)  Vent changed to SIMV today; ABG with decreased CO2 but improved alkalosis. FiO2 weaning as tolerated

## 2022-11-17 NOTE — NURSING
[facility] Sangeeta FLORES notified of sodium 153 and temperature 100.8. Verbal order ok to give next dose tylenol early. Also notified that this RN spoke with pt's sister and she stated that family would be coming tomorrow (11/18) at 2pm and the patient's son should be flying in tomorrow (11/18) at 4pm for family meeting. No new orders received at this time. KISHA. NARCISA.

## 2022-11-17 NOTE — PLAN OF CARE
[facility] Care Plan    POC reviewed with Enrico Burnett  at 0500. Pt unable to verbalize understanding d/t current clinical condition.  No acute events overnight. Pt progressing toward goals. Will continue to monitor. See below and flowsheets for full assessment and VS info.     Still replacing K per prn order (last 3.2)  Pt with multiple liquid Bms overnight. Flexi inserted. PM bath done  VS and Neuro exam remain stable and unchanged.       Is this a stroke patient? yes- Stroke booklet reviewed with patient, risk factors identified for patient and stroke booklet remains at bedside for ongoing education.     Neuro:  Byron Coma Scale  Best Eye Response: 1-->(E1) none  Best Motor Response: 4-->(M4) withdraws from pain  Best Verbal Response: 1-->(V1) none  Byron Coma Scale Score: 6  Assessment Qualifiers: patient intubated  Pupil PERRLA: yes     24hr Temp:  [97.5 °F (36.4 °C)-100.8 °F (38.2 °C)]     CV:   Rhythm: paced rhythm, sinus arrhythmia  BP goals:   SBP < 160  MAP >  60    Resp:   O2 Device (Oxygen Therapy): ventilator  Vent Mode: SIMV  Set Rate: 16 BPM  Oxygen Concentration (%): 40  Vt Set: 460 mL  PEEP/CPAP: 8 cmH20  Pressure Support: 10 cmH20    Plan: wean to extubate    GI/:     Diet/Nutrition Received: tube feeding, NPO  Last Bowel Movement: 11/17/22  Voiding Characteristics: external catheter, incontinence    Intake/Output Summary (Last 24 hours) at 11/17/2022 0636  Last data filed at 11/17/2022 0628  Gross per 24 hour   Intake 2406.08 ml   Output 950 ml   Net 1456.08 ml     Unmeasured Output  Urine Occurrence: 3  Stool Occurrence: 3  Pad Count: 2    Labs/Accuchecks:  Recent Labs   Lab 11/17/22 0514   WBC 20.44*   RBC 4.19*   HGB 12.9*   HCT 44.9         Recent Labs   Lab 11/17/22 0514   *   K 3.2*   CO2 34*      BUN 90*   CREATININE 1.8*   ALKPHOS 88   ALT 30   AST 68*   BILITOT 2.4*      Recent Labs   Lab 11/17/22 0514   INR 1.2   APTT 28.7      Recent Labs   Lab 11/15/22  0255    *   CPKMB 1.5   TROPONINI 0.205*   MB 0.2       Electrolytes: Electrolytes replaced  Accuchecks: Q6H    Gtts:   dextrose 10 % in water (D10W)         LDA/Wounds:  Lines/Drains/Airways       Central Venous Catheter Line  Duration             Percutaneous Central Line Insertion/Assessment - Triple Lumen  right internal jugular -- days              Drain  Duration                  NG/OG Tube Center mouth -- days    Male External Urinary Catheter 11/15/22 1100 Small 1 day         Rectal Tube 11/17/22 0445 rectal tube w/ balloon (indicate number of mLs) <1 day              Airway  Duration                  Airway - Non-Surgical -- days              Peripheral Intravenous Line  Duration                  Midline Catheter Insertion/Assessment  - Single Lumen Left cephalic vein (lateral side of arm) -- days         Midline Catheter Insertion/Assessment  - Single Lumen Right basilic vein (medial side of arm) -- days                  Wounds: Yes  Wound care consulted: Yes   Problem: Adult Inpatient Plan of Care  Goal: Plan of Care Review  Outcome: Ongoing, Progressing  Goal: Patient-Specific Goal (Individualized)  Outcome: Ongoing, Progressing  Goal: Absence of Hospital-Acquired Illness or Injury  Outcome: Ongoing, Progressing  Goal: Optimal Comfort and Wellbeing  Outcome: Ongoing, Progressing     Problem: Adjustment to Illness (Stroke, Hemorrhagic)  Goal: Optimal Coping  Outcome: Ongoing, Progressing     Problem: Bowel Elimination Impaired (Stroke, Hemorrhagic)  Goal: Effective Bowel Elimination  Outcome: Ongoing, Progressing     Problem: Cerebral Tissue Perfusion (Stroke, Hemorrhagic)  Goal: Optimal Cerebral Tissue Perfusion  Outcome: Ongoing, Progressing     Problem: Pain (Stroke, Hemorrhagic)  Goal: Acceptable Pain Control  Outcome: Ongoing, Progressing     Problem: Urinary Elimination Impaired (Stroke, Hemorrhagic)  Goal: Effective Urinary Elimination  Outcome: Ongoing, Progressing     Problem: Fluid and  Electrolyte Imbalance (Acute Kidney Injury/Impairment)  Goal: Fluid and Electrolyte Balance  Outcome: Ongoing, Progressing     Problem: Oral Intake Inadequate (Acute Kidney Injury/Impairment)  Goal: Optimal Nutrition Intake  Outcome: Ongoing, Progressing     Problem: Renal Function Impairment (Acute Kidney Injury/Impairment)  Goal: Effective Renal Function  Outcome: Ongoing, Progressing     Problem: Skin Injury Risk Increased  Goal: Skin Health and Integrity  Outcome: Ongoing, Progressing     Problem: Device-Related Complication Risk (Mechanical Ventilation, Invasive)  Goal: Optimal Device Function  Outcome: Ongoing, Progressing     Problem: Nutrition Impairment (Mechanical Ventilation, Invasive)  Goal: Optimal Nutrition Delivery  Outcome: Ongoing, Progressing     Problem: Skin and Tissue Injury (Mechanical Ventilation, Invasive)  Goal: Absence of Device-Related Skin and Tissue Injury  Outcome: Ongoing, Progressing     Problem: Ventilator-Induced Lung Injury (Mechanical Ventilation, Invasive)  Goal: Absence of Ventilator-Induced Lung Injury  Outcome: Ongoing, Progressing     Problem: Device-Related Complication Risk (Artificial Airway)  Goal: Optimal Device Function  Outcome: Ongoing, Progressing     Problem: Skin and Tissue Injury (Artificial Airway)  Goal: Absence of Device-Related Skin or Tissue Injury  Outcome: Ongoing, Progressing     Problem: Noninvasive Ventilation Acute  Goal: Effective Unassisted Ventilation and Oxygenation  Outcome: Ongoing, Progressing     Problem: Fall Injury Risk  Goal: Absence of Fall and Fall-Related Injury  Outcome: Ongoing, Progressing     Problem: Restraint, Nonbehavioral (Nonviolent)  Goal: Absence of Harm or Injury  Outcome: Ongoing, Progressing     Problem: Infection  Goal: Absence of Infection Signs and Symptoms  Outcome: Ongoing, Progressing     Problem: Impaired Wound Healing  Goal: Optimal Wound Healing  Outcome: Ongoing, Progressing     Problem: Adult Inpatient Plan of  Care  Goal: Readiness for Transition of Care  Outcome: Ongoing, Not Progressing     Problem: Cognitive Impairment (Stroke, Hemorrhagic)  Goal: Optimal Cognitive Function  Outcome: Ongoing, Not Progressing     Problem: Communication Impairment (Stroke, Hemorrhagic)  Goal: Effective Communication Skills  Outcome: Ongoing, Not Progressing     Problem: Functional Ability Impaired (Stroke, Hemorrhagic)  Goal: Optimal Functional Ability  Outcome: Ongoing, Not Progressing     Problem: Respiratory Compromise (Stroke, Hemorrhagic)  Goal: Effective Oxygenation and Ventilation  Outcome: Ongoing, Not Progressing     Problem: Sensorimotor Impairment (Stroke, Hemorrhagic)  Goal: Improved Sensorimotor Function  Outcome: Ongoing, Not Progressing     Problem: Swallowing Impairment (Stroke, Hemorrhagic)  Goal: Optimal Eating and Swallowing Without Aspiration  Outcome: Ongoing, Not Progressing     Problem: Communication Impairment (Mechanical Ventilation, Invasive)  Goal: Effective Communication  Outcome: Ongoing, Not Progressing     Problem: Inability to Wean (Mechanical Ventilation, Invasive)  Goal: Mechanical Ventilation Liberation  Outcome: Ongoing, Not Progressing     Problem: Communication Impairment (Artificial Airway)  Goal: Effective Communication  Outcome: Ongoing, Not Progressing

## 2022-11-17 NOTE — HPI
"A 67-year-old man with CAD-MI, HF, HTN, DM2, diverticulosis who was admitted to [facility] after recurrent falls, "worsening respiratory status", and edema. His hospital course was complicated by Afb with rvr, seizure like activity requiring intubation for airway protection, SAH, and MSSA bacteremia (11/11). He was transferred to [facility] for further management of SAH. On transfer he ws started on cefepime and vancomycin.    Infectious Diseases consulted for "Staph bacteremia in critically ill patient"      "

## 2022-11-17 NOTE — PROGRESS NOTES
VANCOMYCIN DOSING BY PHARMACY DISCONTINUATION NOTE    Enrico Burnett is a 67 y.o. male who had been consulted for vancomycin dosing.    The pharmacy consult for vancomycin dosing has been discontinued.     Vancomycin Dosing by Pharmacy Consult will sign-off. Please reconsult if necessary. Thank you for allowing us to participate in this patient's care.     Rina Alvarez Pharm.D  [id number]

## 2022-11-17 NOTE — ASSESSMENT & PLAN NOTE
E1 V1 M3  Off sedation with persistent unresponsiveness/difficulty aroising, AEDS.  [place] negative for seizure activity

## 2022-11-17 NOTE — ASSESSMENT & PLAN NOTE
Palliative following.     -family to likely visit Thursday to discuss goals of care; will continue to follow

## 2022-11-17 NOTE — ASSESSMENT & PLAN NOTE
Diffuse SAH noted on CTH after concern for seizures over the weekend.   No known trauma, though INR was supra-theraputic.   PCC/vit K(coumadin) and DDAVP (ASA) given prior to transport  Trending daily coags (PT/INR, aPTT)  CTA head read as no concern for Aneurysm/avm   MRI brain without, MRA brain without pending -- MRI to send imaging to arrhythmia clinic for approval for MRI (pacemaker leads potentially too close to surgical clips)  NSGY s/o at this time; not a surgical candidate d/t neurologic status and instability  vasc neuro following

## 2022-11-17 NOTE — PROGRESS NOTES
"[facility] Critical Care  Infectious Disease  Progress Note    Patient Name: Enrico Burnett  MRN: [id number]  Admission Date: 11/14/2022  Length of Stay: 3 days  Attending Physician: Klaus Freraro DO  Primary Care Provider: Mark Rocha MD    Isolation Status: No active isolations  Assessment/Plan:      MSSA bacteremia  MSSA bacteremia complicating hospital course at [facility]. Unclear source of bacteremia from documentation available via EMR. Blood cultures here at [facility] remain NGTD. Low grade fever and leukocytosis noted.  · Continue oxacillin by continuous infusion.  · Will follow up cultures.  · Awaiting goals of care.  · Please clarify goals of care for further management recommendations.      Anticipated Disposition: per primary    Thank you for your consult. I will follow-up with patient. Please contact us if you have any additional questions.    Alison Gaona MD  Infectious Disease  [facility] Critical Care    Subjective:     Principal Problem:SAH (subarachnoid hemorrhage)    HPI: A 67-year-old man with CAD-MI, HF, HTN, DM2, diverticulosis who was admitted to [facility] after recurrent falls, "worsening respiratory status", and edema. His hospital course was complicated by Afb with rvr, seizure like activity requiring intubation for airway protection, SAH, and MSSA bacteremia (11/11). He was transferred to [facility] for further management of SAH. On transfer he ws started on cefepime and vancomycin.    Infectious Diseases consulted for "Staph bacteremia in critically ill patient"        Interval History: "". No acute events overnight. Low grade fever and leukocytosis noted. Awaiting goals of care discussion.    Review of Systems   Unable to perform ROS: Intubated   Objective:     Vital Signs (Most Recent):  Temp: 98.4 °F (36.9 °C) (11/17/22 0900)  Pulse: 97 (11/17/22 0900)  Resp: 18 (11/17/22 0900)  BP: 101/65 (11/17/22 0900)  SpO2: 99 % (11/17/22 0900) Vital " Signs (24h Range):  Temp:  [96.8 °F (36 °C)-100.8 °F (38.2 °C)] 98.4 °F (36.9 °C)  Pulse:  [] 97  Resp:  [5-28] 18  SpO2:  [98 %-100 %] 99 %  BP: ()/(56-72) 101/65     Weight: 100 kg (220 lb 7.4 oz)  Body mass index is 29.9 kg/m².    Estimated Creatinine Clearance: 50.1 mL/min (A) (based on SCr of 1.8 mg/dL (H)).    Physical Exam  Vitals reviewed.   Constitutional:       Appearance: He is well-developed.      Interventions: He is intubated.   HENT:      Head: Normocephalic and atraumatic.      Right Ear: External ear normal.      Left Ear: External ear normal.   Eyes:      Conjunctiva/sclera: Conjunctivae normal.   Neck:      Thyroid: No thyromegaly.   Cardiovascular:      Rate and Rhythm: Normal rate and regular rhythm.      Heart sounds: Normal heart sounds. No murmur heard.  Pulmonary:      Effort: Pulmonary effort is normal. He is intubated.      Breath sounds: Normal breath sounds. No wheezing or rales.   Chest:      Comments: Left sided cardiac device without erythema of the skin.  Abdominal:      General: Bowel sounds are normal.      Palpations: Abdomen is soft. There is no mass.      Tenderness: There is no abdominal tenderness. There is no rebound.   Musculoskeletal:      Cervical back: Normal range of motion and neck supple.   Lymphadenopathy:      Cervical: No cervical adenopathy.   Skin:     General: Skin is warm and dry.   Neurological:      Mental Status: He is lethargic.      Comments: Does not respond to verbal or tactile stimuli       Significant Labs: Blood Culture:   Recent Labs   Lab 11/15/22  0018 11/15/22  0019   LABBLOO No Growth to date  No Growth to date  No Growth to date No Growth to date  No Growth to date  No Growth to date     BMP:   Recent Labs   Lab 11/17/22  0833   *   *   K 3.4*      CO2 32*   BUN 85*   CREATININE 1.8*   CALCIUM 8.5*   MG 2.3     CBC:   Recent Labs   Lab 11/16/22  0301 11/17/22  0514   WBC 13.64* 20.44*   HGB 13.9* 12.9*   HCT  46.5 44.9    162     Respiratory Culture:   Recent Labs   Lab 11/15/22  0954   GSRESP <10 epithelial cells per low power field.  Rare  WBC's  Rare yeast   RESPIRATORYC Normal respiratory hakeem     Urine Culture: No results for input(s): LABURIN in the last 4320 hours.  Urine Studies:   Recent Labs   Lab 11/10/22  2021 11/15/22  0141   COLORU  --  Yellow   APPEARANCEUA  --  Clear   PHUR  --  8.0   SPECGRAV  --  1.015   PROTEINUA  --  Trace*   GLUCUA  --  3+*   KETONESU  --  Negative   BILIRUBINUA  --  Negative   OCCULTUA  --  3+*   NITRITE  --  Negative   UROBILINOGEN Normal  --    LEUKOCYTESUR Trace* 3+*   RBCUA 6* >100*   WBCUA 6* 2   BACTERIA None Seen Occasional   SQUAMEPITHEL  --  1   HYALINECASTS  --  1     Wound Culture: No results for input(s): LABAERO in the last 4320 hours.    Significant Imaging: I have reviewed all pertinent imaging results/findings within the past 24 hours.

## 2022-11-17 NOTE — PT/OT/SLP PROGRESS
Physical Therapy      Patient Name:  Enrico Burnett   MRN:  [id number]    Patient not seen today secondary to  (pt intubated; not appropriate for EOB/OOB mobility at this time). Pt case discussed with RN at bedside. Plan for family meeting per chart review. PT to d/c orders at this time. Please re-consult as needed.    11/17/2022

## 2022-11-17 NOTE — NURSING
Pt transported to MRI on continuous monitoring and portable vent by RN and RT. Ambu bag accompanied pt. Pt tolerated MRI well. VSS.  Pt transported back to room 9097 at 1155 by RN and RT and placed back on bedside monitoring and bedside vent. VSS. NARCISA.

## 2022-11-17 NOTE — ASSESSMENT & PLAN NOTE
MSSA bacteremia complicating hospital course at [facility]. Unclear source of bacteremia from documentation available via EMR. [place] [facility] [place]GTD.   · I favor transitioning from Zosyn/vancomycin to oxacillin by continuous infusion.  · Will follow up cultures.  · Awaiting goals of care.  · Will defer further studies while awaiting goals of care discussions.

## 2022-11-17 NOTE — SUBJECTIVE & OBJECTIVE
Interval History: ". No acute events overnight. Low grade fever and leukocytosis noted. Awaiting goals of care discussion.    Review of Systems   Unable to perform ROS: Intubated   Objective:     Vital Signs (Most Recent):  Temp: 98.4 °F (36.9 °C) (11/17/22 0900)  Pulse: 97 (11/17/22 0900)  Resp: 18 (11/17/22 0900)  BP: 101/65 (11/17/22 0900)  SpO2: 99 % (11/17/22 0900) Vital Signs (24h Range):  Temp:  [96.8 °F (36 °C)-100.8 °F (38.2 °C)] 98.4 °F (36.9 °C)  Pulse:  [] 97  Resp:  [5-28] 18  SpO2:  [98 %-100 %] 99 %  BP: ()/(56-72) 101/65     Weight: 100 kg (220 lb 7.4 oz)  Body mass index is 29.9 kg/m².    Estimated Creatinine Clearance: 50.1 mL/min (A) (based on SCr of 1.8 mg/dL (H)).    Physical Exam  Vitals reviewed.   Constitutional:       Appearance: He is well-developed.      Interventions: He is intubated.   HENT:      Head: Normocephalic and atraumatic.      Right Ear: External ear normal.      Left Ear: External ear normal.   Eyes:      Conjunctiva/sclera: Conjunctivae normal.   Neck:      Thyroid: No thyromegaly.   Cardiovascular:      Rate and Rhythm: Normal rate and regular rhythm.      Heart sounds: Normal heart sounds. No murmur heard.  Pulmonary:      Effort: Pulmonary effort is normal. He is intubated.      Breath sounds: Normal breath sounds. No wheezing or rales.   Chest:      Comments: Left sided cardiac device without erythema of the skin.  Abdominal:      General: Bowel sounds are normal.      Palpations: Abdomen is soft. There is no mass.      Tenderness: There is no abdominal tenderness. There is no rebound.   Musculoskeletal:      Cervical back: Normal range of motion and neck supple.   Lymphadenopathy:      Cervical: No cervical adenopathy.   Skin:     General: Skin is warm and dry.   Neurological:      Mental Status: He is lethargic.      Comments: Does not respond to verbal or tactile stimuli       Significant Labs: Blood Culture:   Recent Labs   Lab 11/15/22  0018  11/15/22  0019   LABBLOO No Growth to date  No Growth to date  No Growth to date No Growth to date  No Growth to date  No Growth to date     BMP:   Recent Labs   Lab 11/17/22  0833   *   *   K 3.4*      CO2 32*   BUN 85*   CREATININE 1.8*   CALCIUM 8.5*   MG 2.3     CBC:   Recent Labs   Lab 11/16/22  0301 11/17/22  0514   WBC 13.64* 20.44*   HGB 13.9* 12.9*   HCT 46.5 44.9    162     Respiratory Culture:   Recent Labs   Lab 11/15/22  0954   GSRESP <10 epithelial cells per low power field.  Rare  WBC's  Rare yeast   RESPIRATORYC Normal respiratory hakeem     Urine Culture: No results for input(s): LABURIN in the last 4320 hours.  Urine Studies:   Recent Labs   Lab 11/10/22  2021 11/15/22  0141   COLORU  --  Yellow   APPEARANCEUA  --  Clear   PHUR  --  8.0   SPECGRAV  --  1.015   PROTEINUA  --  Trace*   GLUCUA  --  3+*   KETONESU  --  Negative   BILIRUBINUA  --  Negative   OCCULTUA  --  3+*   NITRITE  --  Negative   UROBILINOGEN Normal  --    LEUKOCYTESUR Trace* 3+*   RBCUA 6* >100*   WBCUA 6* 2   BACTERIA None Seen Occasional   SQUAMEPITHEL  --  1   HYALINECASTS  --  1     Wound Culture: No results for input(s): LABAERO in the last 4320 hours.    Significant Imaging: I have reviewed all pertinent imaging results/findings within the past 24 hours.

## 2022-11-17 NOTE — ASSESSMENT & PLAN NOTE
Patient is identified as having Systolic (HFrEF) heart failure that is Chronic. CHF is currently uncontrolled due to Continued edema of extremities, Dyspnea not returned to baseline after multiple doses of IV diuretic and Pulmonary edema/pleural effusion on CXR. Latest ECHO performed and demonstrates- Results for orders placed during the hospital encounter of 11/14/22    Echo    Interpretation Summary  · The left ventricle is normal in size with severely decreased systolic function.  · The estimated ejection fraction is 10-15%.  · There is left ventricular global hypokinesis. No thrombus seen.  · Left ventricular diastolic dysfunction.  · Normal right ventricular size with mildly to moderately reduced right ventricular systolic function.  · Severe left atrial enlargement.  · Mechanically ventilated; cannot use inferior caval vein diameter to estimate central venous pressure.  . Continue acetazolamide (restart GDMT when stable) and monitor clinical status closely. Monitor on telemetry. Patient is off CHF pathway.  Monitor strict Is&Os and daily weights.  Place on fluid restriction of tube feedings only. Continue to stress to patient importance of self efficacy and  on diet for CHF. Last BNP reviewed- and noted below   Recent Labs   Lab 11/15/22  0016   *   .

## 2022-11-17 NOTE — SUBJECTIVE & OBJECTIVE
Past Medical History:   Diagnosis Date    CAD (coronary artery disease)     CHF (congestive heart failure)     Chronic a-fib     CKD (chronic kidney disease) stage 3, GFR 30-59 ml/min     Diabetes mellitus type 2 in obese     HTN (hypertension), benign     Hypothyroid     Obesity (BMI 30-39.9)     Seizures     Venous stasis        Past Surgical History:   Procedure Laterality Date    CORONARY ARTERY BYPASS GRAFT      2 vessel    INSERTION OF PACEMAKER  2018    RENAL BIOPSY      TONSILLECTOMY         Review of patient's allergies indicates:  Not on File    Medications:  Medications Prior to Admission   Medication Sig    amiodarone (PACERONE) 200 MG Tab Take 200 mg by mouth.    empagliflozin (JARDIANCE) 10 mg tablet Take 10 mg by mouth.    aspirin (ECOTRIN) 81 MG EC tablet Take 81 mg by mouth once daily.    furosemide (LASIX) 80 MG tablet Take 80 mg by mouth every morning.    metoprolol succinate (TOPROL-XL) 100 MG 24 hr tablet Take 100 mg by mouth once daily.    pravastatin (PRAVACHOL) 40 MG tablet Take 40 mg by mouth once daily.    sacubitriL-valsartan (ENTRESTO) 49-51 mg per tablet Entresto 49 mg-51 mg tablet    warfarin (COUMADIN) 5 MG tablet Take 5 mg by mouth.     Antibiotics (From admission, onward)      Start     Stop Route Frequency Ordered    11/15/22 0900  mupirocin 2 % ointment         11/20 0859 Nasl 2 times daily 11/15/22 0507    11/15/22 0100  piperacillin-tazobactam 4.5 g in sodium chloride 0.9% 100 mL IVPB (ready to mix system)         -- IV Every 8 hours (non-standard times) 11/14/22 2355    11/15/22 0052  vancomycin - pharmacy to dose  (vancomycin IVPB)        See [facility] for full Linked Orders Report.    -- IV pharmacy to manage frequency 11/14/22 2352          Antifungals (From admission, onward)      None          Antivirals (From admission, onward)      None               There is no immunization history on file for this patient.    Family History    None       Social History      Socioeconomic History    Marital status: Unknown   Tobacco Use    Smoking status: Former     Types: Cigarettes     Quit date:      Years since quittin.8    Smokeless tobacco: Never   Substance and Sexual Activity    Alcohol use: Not Currently    Drug use: Never     Review of Systems   Unable to perform ROS: Intubated   Objective:     Vital Signs (Most Recent):  Temp: 97.7 °F (36.5 °C) (22)  Pulse: 98 (22)  Resp: (!) 25 (22)  BP: 90/60 (22)  SpO2: 100 % (22)   Vital Signs (24h Range):  Temp:  [97.7 °F (36.5 °C)-100.8 °F (38.2 °C)] 97.7 °F (36.5 °C)  Pulse:  [] 98  Resp:  [5-26] 25  SpO2:  [99 %-100 %] 100 %  BP: ()/(57-85) 90/60     Weight: 100 kg (220 lb 7.4 oz)  Body mass index is 29.9 kg/m².    Estimated Creatinine Clearance: 50.1 mL/min (A) (based on SCr of 1.8 mg/dL (H)).    Physical Exam  Vitals reviewed.   Constitutional:       Appearance: He is well-developed.      Interventions: He is intubated.   HENT:      Head: Normocephalic and atraumatic.      Right Ear: External ear normal.      Left Ear: External ear normal.   Eyes:      Conjunctiva/sclera: Conjunctivae normal.   Neck:      Thyroid: No thyromegaly.   Cardiovascular:      Rate and Rhythm: Normal rate and regular rhythm.      Heart sounds: Normal heart sounds. No murmur heard.  Pulmonary:      Effort: Pulmonary effort is normal. He is intubated.      Breath sounds: Normal breath sounds. No wheezing or rales.   Abdominal:      General: Bowel sounds are normal.      Palpations: Abdomen is soft. There is no mass.      Tenderness: There is no abdominal tenderness. There is no rebound.   Musculoskeletal:      Cervical back: Normal range of motion and neck supple.   Lymphadenopathy:      Cervical: No cervical adenopathy.   Skin:     General: Skin is warm and dry.   Neurological:      Mental Status: He is lethargic.      Comments: Does not respond to verbal or tactile stimuli        Significant Labs: Blood Culture:   Recent Labs   Lab 11/15/22  0018 11/15/22  0019   LABBLOO No Growth to date  No Growth to date No Growth to date  No Growth to date     BMP:   Recent Labs   Lab 11/16/22  1614   *   *   K 3.0*      CO2 35*   BUN 81*   CREATININE 1.8*   CALCIUM 8.4*   MG 2.4     CBC:   Recent Labs   Lab 11/15/22  0255 11/16/22  0301   WBC 9.33 13.64*   HGB 13.7* 13.9*   HCT 45.5 46.5    169     Respiratory Culture:   Recent Labs   Lab 11/15/22  0954   GSRESP <10 epithelial cells per low power field.  Rare  WBC's  Rare yeast   RESPIRATORYC Normal respiratory ahkeem     Urine Culture: No results for input(s): LABURIN in the last 4320 hours.  Urine Studies:   Recent Labs   Lab 11/10/22  2021 11/15/22  0141   COLORU  --  Yellow   APPEARANCEUA  --  Clear   PHUR  --  8.0   SPECGRAV  --  1.015   PROTEINUA  --  Trace*   GLUCUA  --  3+*   KETONESU  --  Negative   BILIRUBINUA  --  Negative   OCCULTUA  --  3+*   NITRITE  --  Negative   UROBILINOGEN Normal  --    LEUKOCYTESUR Trace* 3+*   RBCUA 6* >100*   WBCUA 6* 2   BACTERIA None Seen Occasional   SQUAMEPITHEL  --  1   HYALINECASTS  --  1     Wound Culture: No results for input(s): LABAERO in the last 4320 hours.    Significant Imaging: I have reviewed all pertinent imaging results/findings within the past 24 hours.

## 2022-11-17 NOTE — PROGRESS NOTES
[facility] - Neuro Critical Care  Neurocritical Care  Progress Note    Admit Date: 11/14/2022  Service Date: 11/15/2022  Length of Stay: 2    Subjective:     Chief Complaint: SAH (subarachnoid hemorrhage)    History of Present Illness: 66 yo M w/ hx a fib/a flutter (on Warfarin), CHF transferred for eval and management for SAH. No notes sent with patient, unable to get exact events/timeline. It appears he was being treated for CHF, had unsuccessful cardioversion on 11/8 and also staph bacteremia on 11/11. Over the weekend, had seizure acitivy over the weekend requiring intuabtion. CTH showed diffuse SAH, CTA read as no Aneurysm/avm. INR was supratheraputic a few days ago > 4, but had decreased to 2's last few days. No reports of trauma, possible spontaneuous in setting of supratheraputic INR. PCC and Vit K given for elevated INR. DDAVP given for ASA. [place] [facility] [place].       Hospital Course:     11/15/2022: weaning versed to off, continue formal EEG and Keppra. Echo done, EF 7%. Patient made DNR by family. Added diamox 500. Consulted ID for staph bacteremia, consulted palliative.     Subjective and Objective:     Afebrile   HR 86  RR 18  /79    Neuro ICU Exam:  General: no acute distress. off sedation  HEENT: No scleral icterus, atraumatic, normocephalic   Pulmonary: no distress, intubated   Cardiac: regular rate and rhythm on telemetry  Abdominal: soft, non-tender, non-distended   Skin: No jaundice, rashes, or visible lesions.    Neuro:  --GCS: E1 VT M  --CN II-XII grossly intact   --PERRL   --Reflexes: +corneal, +gag, +cough  --Motor: RUE trace withdrawal, BLE triple flexion     Labs reviewed  INR 1.3, PTT 26/1  Na 152, K 2.7  ,   Trop .205    Echo:   The left ventricle is normal in size with severely decreased systolic function.   The estimated ejection fraction is 10-15%.   There is left ventricular global hypokinesis. No thrombus seen.   Left ventricular  diastolic dysfunction.   Normal right ventricular size with mildly to moderately reduced right ventricular systolic function.   Severe left atrial enlargement.   Mechanically ventilated; cannot use inferior caval vein diameter to estimate central venous pressure.      Assessment/Plan:       Neuro  * SAH (subarachnoid hemorrhage)  Diffuse SAH noted on CTH after concern for seizures over the weekend.   No known trauma, though INR was supra-therapeutic, peak documented 4.1.   PCC/vit K(coumadin) and DDAVP (ASA) given prior to transport. CTA head read as no concern for Aneurysm/avm   -trend coags daily  -formal EEG today  -MRI/MRA tomorrow after EEG if pacemaker compatible   -NSGY consulted, no acute intervention, CTH in AM  -vasc neuro consulted     Subarachnoid bleed  See SAH      Seizure  Arrived on keppra, unable to view/assess home meds or meds prior to arrival     Off fentanyl and versed with continued difficulty arousing   OSH documented seizure like activity   Keppra 1gm bid IV  Formal EEG today      Pulmonary  Acute respiratory failure with hypoxia and hypercapnia  Arrived on vent, PAO2 60% with peep 5. Bilateral effusion on CT chest  Assess and wean fiO2 as able  PCO2 > 50, but bicarb is extremely elevated (contractin alkalosis from Aggressive diureses)        Cardiac/Vascular  Congestive heart failure  Patient is identified as having Systolic (HFrEF) heart failure that is Chronic. Elevated BNP, trop at OSH and on admission here. Echo with EF 10-15%  - diamox  - fluid restriction  - hold lasix given contraction alkalosis from over diuresis at OSH  - beta blocker and acei/arb when BP tolerates     Elevated troponin  Elevated prior to arrival, will trend      Atrial fibrillation  Rate stable   Continue amiodarone  Hold AC in setting of SAH        Renal/  Metabolic alkalosis  Bicarb > 40, concern for contraction alkalosis in setting of aggressive diuressis  Lasix held; start diamox  Goal net -1L daily       ROSEMARIE (acute kidney injury)  ROSEMARIE vs ROSEMARIE on CKD  follow I/O, crt/bun        ID  Bacteremia  ID consulted  Staph bacteremia at OSH  Cultures drawn on arrival here  Denae and Alissa         Palliative Care  Advance care planning  Palliative consulted   Patient DNR  Family to visit Thursday to discuss GOC further       The patient is being Prophylaxed for:  Venous Thromboembolism with: Mechanical or Chemical  Stress Ulcer with: H2B  Ventilator Pneumonia with: chlorhexidine oral care    Activity Orders          Turn patient starting at 11/15 0000    Elevate HOB starting at 11/14 2326    Diet NPO: NPO starting at 11/14 2326        Partial Code    Kandy Franklin MD  Neurocritical Care  [facility] - Neuro Critical Care

## 2022-11-17 NOTE — ASSESSMENT & PLAN NOTE
Unable to view any documentation from OSH, it was mentioned that EF was 20-25%, and diastolic function was also decreased. Repeat TTE at [facility] with EF 10-15%.     HDS without levo gtt   EKG w/ atrial flutter and AV block; follow-up with AF (without RVR)  -It did appear patient may have been on Entresto at some point, so possible acute on chronic failure

## 2022-11-17 NOTE — ASSESSMENT & PLAN NOTE
Bicarb > 40, concern for contraction alkalosis in setting of aggressive diuressis; improving with diamox  Lasix held; continuing acetazolamide   Goal net -1L daily

## 2022-11-17 NOTE — ASSESSMENT & PLAN NOTE
Arrived on keppra, unable to view/assess home meds or meds prior to arrival    Off fentanyl and versed with continued difficulty arousing   OSH documented seizure like activity but no EEG records obtained.   Keppra 1gm bid IV  Formal EEG without seizure activity

## 2022-11-17 NOTE — PROGRESS NOTES
[facility] - Neuro Critical Care  Neurocritical Care  Progress Note    Admit Date: 11/14/2022  Service Date: 11/16/2022  Length of Stay: 2    Subjective:     Chief Complaint: SAH (subarachnoid hemorrhage)    History of Present Illness: 66 yo M w/ hx a fib/a flutter (on Warfarin), CHF transferred for eval and management for SAH. No notes sent with patient, unable to get exact events/timeline. It appears he was being treated for CHF, had unsuccessful cardioversion on 11/8 and also staph bacteremia on 11/11. Over the weekend, had seizure acitivy over the weekend requiring intuabtion. CTH showed diffuse SAH, CTA read as no Aneurysm/avm. INR was supratheraputic a few days ago > 4, but had decreased to 2's last few days. No reports of trauma, possible spontaneuous in setting of supratheraputic INR. PCC and Vit K given for elevated INR. DDAVP given for ASA. Transferred to [facility] for further eval and management.         Hospital Course: 11/15/2022: weaning versed to off, continue formal EEG and Keppra. Echo done, EF 7%. Patient made DNR by family. Added diamox 500. Consulted ID for staph bacteremia, palliative.   11/16/2022: OSH hospital records received via print. Per OSH records, patient inbubated on 11/11, central line placed 11/14. Patient noted to have seizure-like activity at OSH although no EEG records were obtained. New TTE obtained here w/ EF 10-15%; lowered MAP goal to >60. A/C to SIMV; ABG with improving alkalosis; continuing diamox. Continuing abx for bacteremia; BC NGTD.       Interval History:  NAEON. Refer to hospital course above.    Review of Systems   Unable to perform ROS: Intubated     Objective:     Vitals:  Temp: 97.7 °F (36.5 °C)  Pulse: 98  Rhythm: sinus arrhythmia, paced rhythm (pacemaker)  BP: 90/60  MAP (mmHg): 70  Resp: (!) 25  SpO2: 100 %  Oxygen Concentration (%): 40  O2 Device (Oxygen Therapy): ventilator  Vent Mode: SIMV  Set Rate: 16 BPM  Vt Set: 460 mL  Pressure Support: 10 cmH20  PEEP/CPAP: 8  cmH20  Peak Airway Pressure: 32 cmH2O  Mean Airway Pressure: 9.9 cmH20  Plateau Pressure: 0 cmH20    Temp  Min: 97.7 °F (36.5 °C)  Max: 100.8 °F (38.2 °C)  Pulse  Min: 85  Max: 105  BP  Min: 90/60  Max: 142/85  MAP (mmHg)  Min: 70  Max: 106  Resp  Min: 5  Max: 26  SpO2  Min: 99 %  Max: 100 %  Oxygen Concentration (%)  Min: 40  Max: 50    11/15 0701 - 11/16 0700  In: 1201.4 [I.V.:27.2]  Out: 1940 [Urine:1940]   Unmeasured Output  Urine Occurrence: (P) 1  Stool Occurrence: 1  Pad Count: 1       Physical Exam  Constitutional:       Appearance: He is obese. He is ill-appearing.      Comments: Intubated; unresponsive. Not on sedation   HENT:      Head: Normocephalic and atraumatic.      Right Ear: External ear normal.      Ears:      Comments: Necrotic wound present on L ear      Nose: Nose normal.      Mouth/Throat:      Mouth: Mucous membranes are moist.      Comments: ETT in place  Eyes:      Conjunctiva/sclera: Conjunctivae normal.   Cardiovascular:      Rate and Rhythm: Normal rate. Rhythm irregular.      Heart sounds: Normal heart sounds.   Pulmonary:      Breath sounds: Normal breath sounds.      Comments: Ventilated breath sounds equal bilaterally   Abdominal:      General: Abdomen is flat. There is no distension.      Palpations: Abdomen is soft.   Musculoskeletal:      Cervical back: Neck supple.      Right lower leg: No edema.      Left lower leg: No edema.      Comments: Multiple wounds over bilateral LE; dressings CDI. WC following    Skin:     General: Skin is warm and dry.      Capillary Refill: Capillary refill takes less than 2 seconds.   Neurological:      Comments: Unresponsive; not on sedation. Very difficult to arouse with verbal and tactile stimulation. No response to painful stimulus on UE; weak withdrawal to painful stimulus on LE (unclear if w/d vs triple flexion).          Medications:  Continuousdextrose 10 % in water (D10W)    ScheduledacetaZOLAMIDE, 500 mg, Q12H  amiodarone, 200 mg,  Daily  famotidine, 20 mg, BID  heparin (porcine), 5,000 Units, Q8H  levetiracetam IV, 1,000 mg, Q12H  mupirocin, , BID  piperacillin-tazobactam (ZOSYN) IVPB, 4.5 g, Q8H  senna-docusate 8.6-50 mg, 1 tablet, BID    PRNacetaminophen, 650 mg, Q6H PRN  albuterol-ipratropium, 3 mL, Q4H PRN  calcium gluconate IVPB, 1 g, PRN  calcium gluconate IVPB, 2 g, PRN  calcium gluconate IVPB, 3 g, PRN  dextrose 10 % in water (D10W), , Continuous PRN  dextrose 10%, 12.5 g, PRN  dextrose 10%, 25 g, PRN  glucagon (human recombinant), 1 mg, PRN  insulin aspart U-100, 1-10 Units, Q4H PRN  labetalol, 10 mg, Q4H PRN  magnesium sulfate IVPB, 2 g, PRN  magnesium sulfate IVPB, 4 g, PRN  ondansetron, 4 mg, Q8H PRN  potassium chloride in water, 40 mEq, PRN   And  potassium chloride in water, 40 mEq, PRN   And  potassium chloride in water, 40 mEq, PRN  sodium chloride 0.9%, 10 mL, PRN  sodium phosphate IVPB, 15 mmol, PRN  sodium phosphate IVPB, 20.01 mmol, PRN  sodium phosphate IVPB, 30 mmol, PRN  vancomycin - pharmacy to dose, , pharmacy to manage frequency        Diet  Diet NPO      Assessment/Plan:     Neuro  * SAH (subarachnoid hemorrhage)  Diffuse SAH noted on CTH after concern for seizures over the weekend.   No known trauma, though INR was supra-theraputic.   PCC/vit K(coumadin) and DDAVP (ASA) given prior to transport  Trending daily coags (PT/INR, aPTT)  CTA head read as no concern for Aneurysm/avm   MRI brain without, MRA brain without pending -- MRI to send imaging to arrhythmia clinic for approval for MRI (pacemaker leads potentially too close to surgical clips)  NSGY s/o at this time; not a surgical candidate d/t neurologic status and instability  vasc neuro following     Subarachnoid bleed  See SAH    Byron coma scale total score 3-8  E1 V1 M3  Off sedation with persistent unresponsiveness/difficulty aroising, AEDS.  [place] negative for seizure activity       Seizure  Arrived on kera, unable to view/assess home meds or meds prior  to arrival    Off fentanyl and versed with continued difficulty arousing   OSH documented seizure like activity but no EEG records obtained.   Keppra 1gm bid IV  Formal EEG without seizure activity     Pulmonary  Acute respiratory failure with hypoxia and hypercapnia  Arrived on vent, PAo2 60% with peep 5. Bilateral effusion on CT chest  Assess and wean fiO2 as able  PCO2 > 50, but bicarb is extremely elevated (contractin alkalosis from Aggressive diureses)  Vent changed to SIMV today; ABG with decreased CO2 but improved alkalosis. FiO2 weaning as tolerated         Cardiac/Vascular  Congestive heart failure  Patient is identified as having Systolic (HFrEF) heart failure that is Chronic. CHF is currently uncontrolled due to Continued edema of extremities, Dyspnea not returned to baseline after multiple doses of IV diuretic and Pulmonary edema/pleural effusion on CXR. Latest ECHO performed and demonstrates- Results for orders placed during the hospital encounter of 11/14/22    Echo    Interpretation Summary  · The left ventricle is normal in size with severely decreased systolic function.  · The estimated ejection fraction is 10-15%.  · There is left ventricular global hypokinesis. No thrombus seen.  · Left ventricular diastolic dysfunction.  · Normal right ventricular size with mildly to moderately reduced right ventricular systolic function.  · Severe left atrial enlargement.  · Mechanically ventilated; cannot use inferior caval vein diameter to estimate central venous pressure.  . Continue acetazolamide (restart GDMT when stable) and monitor clinical status closely. Monitor on telemetry. Patient is off CHF pathway.  Monitor strict Is&Os and daily weights.  Place on fluid restriction of tube feedings only. Continue to stress to patient importance of self efficacy and  on diet for CHF. Last BNP reviewed- and noted below   Recent Labs   Lab 11/15/22  0016   *   .      Elevated troponin  Elevated prior to  arrival, will trend     Atrial fibrillation  Rate stable   Amio cont.   Began SQH DVT ppx     Hypotension  Unsure of cause on admissed, it was mentioned when calling for transfer that patient may be bacteremic/sepsie. Aggressive diuresis over past few weeks/days, hypovolemia. AEDs versed gtt, and possible propofol prior to transfer. Required Levo for SBP > 100 and MAP > 65    -goal MAP>60 in setting of HFrEF EF 10-15%  -off levo gtt; hypotension resolved        Combined systolic and diastolic congestive heart failure  Unable to view any documentation from OSH, it was mentioned that EF was 20-25%, and diastolic function was also decreased. Repeat TTE at [facility] with EF 10-15%.     HDS without levo gtt   EKG w/ atrial flutter and AV block; follow-up with AF (without RVR)  -It did appear patient may have been on Entresto at some point, so possible acute on chronic failure          Renal/  Metabolic alkalosis  Bicarb > 40, concern for contraction alkalosis in setting of aggressive diuressis; improving with diamox  Lasix held; continuing acetazolamide   Goal net -1L daily     ROSEMARIE (acute kidney injury)  ROSEMARIE vs ROSEMARIE on CKD  follow I/O, crt/bun      ID  Bacteremia  Blood Culture NGTD  Zosyn and Vanc       Palliative Care  Advance care planning  See palliative care encounter     Palliative care encounter  Palliative following.     -family to likely visit Thursday to discuss goals of care; will continue to follow           The patient is being Prophylaxed for:  Venous Thromboembolism with: Chemical  Stress Ulcer with: H2B  Ventilator Pneumonia with: chlorhexidine oral care    Activity Orders          Turn patient starting at 11/15 0000    Elevate HOB starting at 11/14 2326    Diet NPO: NPO starting at 11/14 2326        Partial Code    Abby Leyva MD  Neurocritical Care  [facility] - Neuro Critical Care

## 2022-11-17 NOTE — ASSESSMENT & PLAN NOTE
Unsure of cause on admissed, it was mentioned when calling for transfer that patient may be bacteremic/sepsie. Aggressive diuresis over past few weeks/days, hypovolemia. AEDs versed gtt, and possible propofol prior to transfer. Required Levo for SBP > 100 and MAP > 65    -goal MAP>60 in setting of HFrEF EF 10-15%  -off levo gtt; hypotension resolved

## 2022-11-17 NOTE — PLAN OF CARE
[facility] Care Plan    POC reviewed with Enrico Burnett and wife via telephone at 1200. Pt unable to verbalize understanding. Pt's wife verbalized understanding via telephone. Questions and concerns addressed. Pt progressing toward goals. Will continue to monitor. See below and flowsheets for full assessment and VS info.   -MRI completed today  -Waiting on family for meeting, tentatively this weekend per wife via telephone  -Central line and midlines removed per Dr Ronan PULLIAM order, replaced with peripheral IVs  -Febrile, changed antibiotics today, tylenol given  -[place] completed   -Frequent labs, replacing electrolytes per order  -Multiple wounds, wound care following       Is this a stroke patient? yes- Stroke booklet reviewed with patient, risk factors identified for patient and stroke booklet remains at bedside for ongoing education.     Neuro:  Byron Coma Scale  Best Eye Response: 1-->(E1) none  Best Motor Response: 4-->(M4) withdraws from pain  Best Verbal Response: 1-->(V1) none  [place] Coma Scale Score: 6  Assessment Qualifiers: patient intubated, no eye obstruction present  Pupil PERRLA: yes     24 hr Temp:  [96.8 °F (36 °C)-100.8 °F (38.2 °C)]     CV:   Rhythm: paced rhythm, atrial rhythm  BP goals:   SBP < 160  MAP >  60    Resp:   O2 Device (Oxygen Therapy): ventilator  Vent Mode: SIMV  Set Rate: 20 BPM  Oxygen Concentration (%): 40  Vt Set: 460 mL  PEEP/CPAP: 8 cmH20  Pressure Support: 10 cmH20    Plan: wean to extubate    GI/:     Diet/Nutrition Received: NPO, tube feeding  Last Bowel Movement: 11/17/22  Voiding Characteristics: external catheter    Intake/Output Summary (Last 24 hours) at 11/17/2022 1628  Last data filed at 11/17/2022 1610  Gross per 24 hour   Intake 2776.61 ml   Output 1325 ml   Net 1451.61 ml     Unmeasured Output  Urine Occurrence: 3  Stool Occurrence: 3  Pad Count: 2    Labs/Accuchecks:  Recent Labs   Lab 11/17/22  0514   WBC 20.44*   RBC 4.19*   HGB 12.9*   HCT 44.9          Recent Labs   Lab 11/17/22  0514 11/17/22  0833   * 150*   K 3.2* 3.4*   CO2 34* 32*    106   BUN 90* 85*   CREATININE 1.8* 1.8*   ALKPHOS 88  --    ALT 30  --    AST 68*  --    BILITOT 2.4*  --       Recent Labs   Lab 11/17/22  0514   INR 1.2   APTT 28.7      Recent Labs   Lab 11/15/22  0255   *   CPKMB 1.5   TROPONINI 0.205*   MB 0.2       Electrolytes: Electrolytes replaced  Accuchecks: Q4H    Gtts:   dextrose 10 % in water (D10W)         LDA/Wounds:  Lines/Drains/Airways       Drain  Duration                  NG/OG Tube Center mouth -- days    Male External Urinary Catheter 11/15/22 1100 Small 2 days         Rectal Tube 11/17/22 0445 rectal tube w/ balloon (indicate number of mLs) <1 day              Airway  Duration                  Airway - Non-Surgical -- days              Peripheral Intravenous Line  Duration                  Peripheral IV - Single Lumen 11/17/22 1549 20 G Anterior;Right Forearm <1 day         Peripheral IV - Single Lumen 11/17/22 1550 20 G Anterior;Left Forearm <1 day         Peripheral IV - Single Lumen 11/17/22 1553 18 G Anterior;Right Upper Arm <1 day         Peripheral IV - Single Lumen 11/17/22 1556 20 G Anterior;Left Antecubital <1 day                  Wounds: Yes  Wound care consulted: Yes

## 2022-11-17 NOTE — PLAN OF CARE
[facility] - Neuro Critical Care  Discharge Reassessment    Primary Care Provider: Mark Rocha MD    Expected Discharge Date: 11/23/2022    Patient intubated on vent.  Not medically stable for discharge.      Reassessment (most recent)       Discharge Reassessment - 11/17/22 1255          Discharge Reassessment    Assessment Type Discharge Planning Reassessment     Did the patient's condition or plan change since previous assessment? No     Communicated SHANIQUA with patient/caregiver Date not available/Unable to determine     Discharge Plan A Long-term acute care facility (LTAC)     Discharge Plan B Skilled Nursing Facility     DME Needed Upon Discharge  other (see comments)   tbd    Discharge Barriers Identified None     Why the patient remains in the hospital Requires continued medical care                     Danika Causey RN, CCRN-K, [facility]  Neuro-Critical Care   X [phone]

## 2022-11-17 NOTE — ASSESSMENT & PLAN NOTE
MSSA bacteremia complicating hospital course at [facility]. Unclear source of bacteremia from documentation available via EMR. Blood cultures here at [facility] remain NGTD. Low grade fever and leukocytosis noted.  · Continue oxacillin by continuous infusion.  · Will follow up cultures.  · Awaiting goals of care.  · Please clarify goals of care for further management recommendations.

## 2022-11-17 NOTE — SUBJECTIVE & OBJECTIVE
Interval History:  NAEON. Refer to hospital course above.    Review of Systems   Unable to perform ROS: Intubated     Objective:     Vitals:  Temp: 97.7 °F (36.5 °C)  Pulse: 98  Rhythm: sinus arrhythmia, paced rhythm (pacemaker)  BP: 90/60  MAP (mmHg): 70  Resp: (!) 25  SpO2: 100 %  Oxygen Concentration (%): 40  O2 Device (Oxygen Therapy): ventilator  Vent Mode: SIMV  Set Rate: 16 BPM  Vt Set: 460 mL  Pressure Support: 10 cmH20  PEEP/CPAP: 8 cmH20  Peak Airway Pressure: 32 cmH2O  Mean Airway Pressure: 9.9 cmH20  Plateau Pressure: 0 cmH20    Temp  Min: 97.7 °F (36.5 °C)  Max: 100.8 °F (38.2 °C)  Pulse  Min: 85  Max: 105  BP  Min: 90/60  Max: 142/85  MAP (mmHg)  Min: 70  Max: 106  Resp  Min: 5  Max: 26  SpO2  Min: 99 %  Max: 100 %  Oxygen Concentration (%)  Min: 40  Max: 50    11/15 0701 - 11/16 0700  In: 1201.4 [I.V.:27.2]  Out: 1940 [Urine:1940]   Unmeasured Output  Urine Occurrence: (P) 1  Stool Occurrence: 1  Pad Count: 1       Physical Exam  Constitutional:       Appearance: He is obese. He is ill-appearing.      Comments: Intubated; unresponsive. Not on sedation   HENT:      Head: Normocephalic and atraumatic.      Right Ear: External ear normal.      Ears:      Comments: Necrotic wound present on L ear      Nose: Nose normal.      Mouth/Throat:      Mouth: Mucous membranes are moist.      Comments: ETT in place  Eyes:      Conjunctiva/sclera: Conjunctivae normal.   Cardiovascular:      Rate and Rhythm: Normal rate. Rhythm irregular.      Heart sounds: Normal heart sounds.   Pulmonary:      Breath sounds: Normal breath sounds.      Comments: Ventilated breath sounds equal bilaterally   Abdominal:      General: Abdomen is flat. There is no distension.      Palpations: Abdomen is soft.   Musculoskeletal:      Cervical back: Neck supple.      Right lower leg: No edema.      Left lower leg: No edema.      Comments: Multiple wounds over bilateral LE; dressings CDI. WC following    Skin:     General: Skin is warm and  dry.      Capillary Refill: Capillary refill takes less than 2 seconds.   Neurological:      Comments: Unresponsive; not on sedation. Very difficult to arouse with verbal and tactile stimulation. No response to painful stimulus on UE; weak withdrawal to painful stimulus on LE (unclear if w/d vs triple flexion).          Medications:  Continuousdextrose 10 % in water (D10W)    ScheduledacetaZOLAMIDE, 500 mg, Q12H  amiodarone, 200 mg, Daily  famotidine, 20 mg, BID  heparin (porcine), 5,000 Units, Q8H  levetiracetam IV, 1,000 mg, Q12H  mupirocin, , BID  piperacillin-tazobactam (ZOSYN) IVPB, 4.5 g, Q8H  senna-docusate 8.6-50 mg, 1 tablet, BID    PRNacetaminophen, 650 mg, Q6H PRN  albuterol-ipratropium, 3 mL, Q4H PRN  calcium gluconate IVPB, 1 g, PRN  calcium gluconate IVPB, 2 g, PRN  calcium gluconate IVPB, 3 g, PRN  dextrose 10 % in water (D10W), , Continuous PRN  dextrose 10%, 12.5 g, PRN  dextrose 10%, 25 g, PRN  glucagon (human recombinant), 1 mg, PRN  insulin aspart U-100, 1-10 Units, Q4H PRN  labetalol, 10 mg, Q4H PRN  magnesium sulfate IVPB, 2 g, PRN  magnesium sulfate IVPB, 4 g, PRN  ondansetron, 4 mg, Q8H PRN  potassium chloride in water, 40 mEq, PRN   And  potassium chloride in water, 40 mEq, PRN   And  potassium chloride in water, 40 mEq, PRN  sodium chloride 0.9%, 10 mL, PRN  sodium phosphate IVPB, 15 mmol, PRN  sodium phosphate IVPB, 20.01 mmol, PRN  sodium phosphate IVPB, 30 mmol, PRN  vancomycin - pharmacy to dose, , pharmacy to manage frequency        Diet  Diet NPO

## 2022-11-18 NOTE — PHYSICIAN QUERY
PT Name: Enrico Burnett  MR #: [id number]     DOCUMENTATION CLARIFICATION     CDS/:  Mark Egan RN, CDS                   Contact Information:  [email or web address]    This form is a permanent document in the medical record.     Query Date: November 18, 2022    By submitting this query, we are merely seeking further clarification of documentation.  Please utilize your independent clinical judgment when addressing the question(s) below.    The Medical Record contains the following   Indicators Supporting Clinical Findings Location in Medical Record   X Heart Failure documented Congestive heart failure  Systolic (HFrEF) heart failure that is Chronic    Combined systolic and diastolic congestive heart failure        It did appear patient may have been on Entresto at some point, so possible acute on chronic failure     [facility] PN 11/17   X BNP BNP 1, 976.2          .2       Labs 11/12 - prev encounter: [place]    Labs 11/14    Labs 11/15   X EF/Echo The left ventricle is normal in size with severely decreased systolic function.  The estimated ejection fraction is 10-15%.  There is left ventricular global hypokinesis. No thrombus seen.  Left ventricular diastolic dysfunction.  Normal right ventricular size with mildly to moderately reduced right ventricular systolic function.  Severe left atrial enlargement.  Mechanically ventilated; cannot use inferior caval vein diameter to estimate central venous pressure.    Unable to view any documentation from OSH, it was mentioned that EF was 20-25%, and diastolic function was also decreased. Repeat TTE at [facility] with EF 10-15%.       Echo 11/15                      NCC PN 11/16   X Radiology findings  There is central vascular congestion with bilateral interstitial and airspace opacities throughout both lungs.  Probable moderate left and small right pleural effusions with adjacent passive atelectasis or airspace disease in the lung bases.   Significant increased density in the retrocardiac left lung base which could be exaggerated by enlarged cardiac silhouette though superimposed consolidation or pleural fluid could have this appearance as well   CXR 11/15   X Subjective/Objective Respiratory Conditions Acute respiratory failure with hypoxia and hypercapnia    Coarse/diminished bases   NCC PN 11/17    H&P 11/14    Recent/Current MI      Heart Transplant, LVAD     X Edema, JVD Right lower leg: Edema present.   Left lower leg: Edema present VN Consult 11/15      Ascites     X Diuretics/Meds Continue acetazolamide (restart GDMT when stable) and monitor clinical status closely.    NCC PN  11/16   X Other Treatment - diamox  - fluid restriction  - hold lasix given contraction alkalosis from over diuresis at OSH  - beta blocker and acei/arb when BP tolerates NCC PN 11/16           X Other CHF is currently uncontrolled due to Continued edema of extremities, Dyspnea not returned to baseline after multiple doses of IV diuretic and Pulmonary edema/pleural effusion on CXR.    Elevated BNP, trop at OSH and on admission here. Echo with EF 10-15%    Repeat echo here shows severe systolic HF with EF 10-15%, appears reasonably compensated with low FiO2 needs to maintain oxygenation on 8 of PEEP ventilation, and is hemodynamically stable.   NCC PN 11/16     Heart failure is a clinical diagnosis which includes symptomatic fluid retention, elevated intracardiac pressures, and/or the inability of the heart to deliver adequate blood flow.    Heart Failure with reduced Ejection Fraction (HFrEF) or Systolic Heart Failure (loses ability to contract normally, EF is <40%)    Heart Failure with preserved Ejection Fraction (HFpEF) or Diastolic Heart Failure (stiff ventricles, does not relax properly, EF is >50%)     Heart Failure with Combined Systolic and Diastolic Failure (stiff ventricles, does not relax properly and EF is <50%)    Mid-range or mildly reduced ejection  "fraction (HFmrEF) is classified as systolic heart failure.  Congestive heart failure with a recovered EF is classified as Diastolic Heart Failure.  Common clues to acute exacerbation:  Rapidly progressive symptoms (w/in 2 weeks of presentation), using IV diuretics, using supplemental O2, pulmonary edema on Xray, new or worsening pleural effusion, +JVD or other signs of volume overload, MI w/in 4 weeks, and/or BNP >500  The clinical guidelines noted are only system guidelines, and do not replace the providers clinical judgment.    Provider, do conflicting documentation please specify the diagnosis"Congestive Heart Failure" associated with the above clinical findings.    [  x ]  Acute on Chronic Combined Systolic and Diastolic Heart Failure - worsening of CHF signs/symptoms in preexisting CHF   [   ]  Chronic Systolic Heart Failure (HFrEF or HFmrEF) - preexisting and stable   [   ]  Acute on Chronic Systolic Heart Failure (HFrEF or HFmrEF) - worsening of CHF signs/symptoms in preexisting CHF   [   ]  Chronic Combined Systolic and Diastolic Heart Failure - pre-existing and stable   [   ]  Other (please specify): ___________________________________   [   ]  Clinically Undetermined       Please document in your progress notes daily for the duration of treatment until resolved and include in your discharge summary.    References:  American Heart Association editorial staff. (2017, May). Ejection Fraction Heart Failure Measurement. American Heart Association. https://www.heart.org/en/health-topics/heart-failure/diagnosing-heart-failure/ejection-fraction-heart-failure-measurement#:~:text=Ejection%20fraction%20(EF)%20is%20a,pushed%20out%20with%20each%20heartbeat  JUAN MANUEL Hui (2020, December 15). Heart failure with preserved ejection fraction: Clinical manifestations and diagnosis. UpToDate. " https://www.[employer]/contents/heart-failure-with-preserved-ejection-fraction-clinical-manifestations-and-diagnosis.  ICD-10-CM/PCS Coding Clinic Third Quarter ICD-10, Effective with discharges: September 8, 2020 [facility] § Heart failure with mid-range or mildly reduced ejection fraction (2020).  ICD-10-CM/PCS Coding Clinic Third Quarter ICD-10, Effective with discharges: September 8, 2020 [facility] § Heart failure with recovered ejection fraction (2020).  Form No. 17199

## 2022-11-18 NOTE — SUBJECTIVE & OBJECTIVE
Interval History:  Refer to hospital course above    Review of Systems   Unable to perform ROS: Intubated     Objective:     Vitals:  Temp: 99.9 °F (37.7 °C)  Pulse: 89  Rhythm: paced rhythm, atrial rhythm  BP: 121/67  MAP (mmHg): 88  Resp: (!) 26  SpO2: 100 %  Oxygen Concentration (%): 40  O2 Device (Oxygen Therapy): ventilator  Vent Mode: SIMV  Set Rate: 20 BPM  Vt Set: 460 mL  Pressure Support: 10 cmH20  PEEP/CPAP: 8 cmH20  Peak Airway Pressure: 24 cmH2O  Mean Airway Pressure: 12 cmH20  Plateau Pressure: 0 cmH20    Temp  Min: 96.8 °F (36 °C)  Max: 100.8 °F (38.2 °C)  Pulse  Min: 87  Max: 115  BP  Min: 89/64  Max: 138/76  MAP (mmHg)  Min: 70  Max: 100  Resp  Min: 16  Max: 28  SpO2  Min: 96 %  Max: 100 %  Oxygen Concentration (%)  Min: 40  Max: 40    11/16 0701 - 11/17 0700  In: 2406.1   Out: 950 [Urine:950]   Unmeasured Output  Urine Occurrence: 3  Stool Occurrence: 3  Pad Count: 2       Physical Exam  Physical Exam  Constitutional:       Appearance: He is obese. He is ill-appearing.      Comments: Intubated; unresponsive. Not on sedation   HENT:      Head: Normocephalic and atraumatic.      Right Ear: External ear normal.      Ears:      Comments: Necrotic wound present on L ear      Nose: Nose normal.      Mouth/Throat:      Mouth: Mucous membranes are moist.      Comments: ETT in place  Eyes:      Conjunctiva/sclera: Conjunctivae normal.   Cardiovascular:      Rate and Rhythm: Normal rate. Rhythm irregular.      Heart sounds: Normal heart sounds.   Pulmonary:      Breath sounds: Normal breath sounds.      Comments: Ventilated breath sounds equal bilaterally   Abdominal:      General: Abdomen is flat. There is no distension.      Palpations: Abdomen is soft.   Musculoskeletal:      Cervical back: Neck supple.      Right lower leg: No edema.      Left lower leg: No edema.      Comments: Multiple wounds over bilateral LE; dressings CDI. WC following    Skin:     General: Skin is warm and dry. Wounds and multiple  keratosis throughout bilateral UE and hands.      Capillary Refill: Capillary refill takes less than 2 seconds.   Neurological:      Comments: Eyes open with tactile and verbal stimulation. Persistently difficult to arouse. No response to painful stimulus on UE, no withdrawal; weak withdrawal to painful stimulus on LE.        Medications:  Continuousdextrose 10 % in water (D10W)    Scheduledamiodarone, 200 mg, Daily  famotidine, 20 mg, BID  heparin (porcine), 5,000 Units, Q8H  insulin aspart U-100, 4 Units, 6 times per day  levetiracetam IV, 500 mg, Q12H  mupirocin, , BID  oxacillin 12 g in  mL CONTINUOUS INFUSION, 12 g, Q24H    PRNacetaminophen, 650 mg, Q6H PRN  albuterol-ipratropium, 3 mL, Q4H PRN  dextrose 10 % in water (D10W), , Continuous PRN  dextrose 10%, 12.5 g, PRN  dextrose 10%, 25 g, PRN  glucagon (human recombinant), 1 mg, PRN  insulin aspart U-100, 1-10 Units, Q4H PRN  labetalol, 10 mg, Q4H PRN  magnesium oxide, 800 mg, PRN  magnesium oxide, 800 mg, PRN  potassium bicarbonate, 35 mEq, PRN  potassium bicarbonate, 50 mEq, PRN  potassium bicarbonate, 60 mEq, PRN  potassium, sodium phosphates, 2 packet, PRN  potassium, sodium phosphates, 2 packet, PRN  potassium, sodium phosphates, 2 packet, PRN  sodium chloride 0.9%, 10 mL, PRN        Diet  NPO

## 2022-11-18 NOTE — ASSESSMENT & PLAN NOTE
Arrived on keppra, unable to view/assess home meds or meds prior to arrival. OSH documented seizure like activity but no EEG records obtained.     Off fentanyl and versed with continued difficulty arousing   Keppra 500 bid IV  Formal EEG without seizure activity

## 2022-11-18 NOTE — ASSESSMENT & PLAN NOTE
Bicarb > 40, concern for contraction alkalosis in setting of aggressive diuressis; improved with diamox  Continuing to follow CMP and ABGs as indicated; improving on ABG  Lasix held; pulm edema improved on CXR 11/17

## 2022-11-18 NOTE — PROGRESS NOTES
"[facility] Critical Care  Infectious Disease  Progress Note    Patient Name: Enrico Burnett  MRN: [id number]  Admission Date: 11/14/2022  Length of Stay: 4 days  Attending Physician: Klaus Ferraro DO  Primary Care Provider: Mark Rocha MD    Isolation Status: No active isolations  Assessment/Plan:      MSSA bacteremia  MSSA bacteremia complicating hospital course at [facility]. Unclear source of bacteremia from documentation available via EMR. Blood cultures here at [facility] remain NGTD. Febrile and with leukocytosis. Goals of care discussions still pending.  · Continue oxacillin by continuous infusion.  · Repeat blood cultures ordered.  · Awaiting goals of care.  · If in line with goals of care I would favor SIVA and abdominal imaging to evaluate for occult source of infection.      Anticipated Disposition: per primary    Thank you for your consult. I will follow-up with patient. Please contact us if you have any additional questions.    Alison Gaona MD  Infectious Disease  [facility] Critical Care    Subjective:     Principal Problem:SAH (subarachnoid hemorrhage)    HPI: A 67-year-old man with CAD-MI, HF, HTN, DM2, diverticulosis who was admitted to [facility] after recurrent falls, "worsening respiratory status", and edema. His hospital course was complicated by [employer] with rvr, seizure like activity requiring intubation for airway protection, SAH, and MSSA bacteremia (11/11). He was transferred to [facility] for further management of SAH. On transfer he ws started on cefepime and vancomycin.    Infectious Diseases consulted for "Staph bacteremia in critically ill patient"        Interval History: "". No acute events overnight. Low grade fever and leukocytosis noted. Awaiting goals of care discussion.    Review of Systems   Unable to perform ROS: Intubated   Objective:     Vital Signs (Most Recent):  Temp: (!) 100.8 °F (38.2 °C) (11/18/22 1201)  Pulse: 107 (11/18/22 " 1201)  Resp: (!) 21 (11/18/22 1201)  BP: 90/64 (11/18/22 1201)  SpO2: 98 % (11/18/22 1201) Vital Signs (24h Range):  Temp:  [97.9 °F (36.6 °C)-100.9 °F (38.3 °C)] 100.8 °F (38.2 °C)  Pulse:  [] 107  Resp:  [19-39] 21  SpO2:  [95 %-100 %] 98 %  BP: ()/(51-84) 90/64     Weight: 100 kg (220 lb 7.4 oz)  Body mass index is 29.9 kg/m².    Estimated Creatinine Clearance: 56.4 mL/min (A) (based on SCr of 1.6 mg/dL (H)).    Physical Exam  Vitals reviewed.   Constitutional:       Appearance: He is well-developed.      Interventions: He is intubated.   HENT:      Head: Normocephalic and atraumatic.      Right Ear: External ear normal.      Left Ear: External ear normal.   Eyes:      Conjunctiva/sclera: Conjunctivae normal.   Neck:      Thyroid: No thyromegaly.   Cardiovascular:      Rate and Rhythm: Normal rate and regular rhythm.      Heart sounds: Normal heart sounds. No murmur heard.  Pulmonary:      Effort: Pulmonary effort is normal. He is intubated.      Breath sounds: Normal breath sounds. No wheezing or rales.   Chest:      Comments: Left sided cardiac device without erythema of the skin.  Abdominal:      General: Bowel sounds are normal.      Palpations: Abdomen is soft. There is no mass.      Tenderness: There is no abdominal tenderness. There is no rebound.   Musculoskeletal:      Cervical back: Normal range of motion and neck supple.   Lymphadenopathy:      Cervical: No cervical adenopathy.   Skin:     General: Skin is warm and dry.      Findings: Bruising present.   Neurological:      Mental Status: He is lethargic.      Comments: Does not respond to verbal or tactile stimuli       Significant Labs: Blood Culture:   Recent Labs   Lab 11/15/22  0018 11/15/22  0019   LABBLOO No Growth to date  No Growth to date  No Growth to date  No Growth to date No Growth to date  No Growth to date  No Growth to date  No Growth to date       BMP:   Recent Labs   Lab 11/18/22  0956 11/18/22  0957   *  --     *  --    K 3.9  --      --    CO2 33*  --    BUN 93*  --    CREATININE 1.6*  --    CALCIUM 8.8  --    MG  --  2.4       CBC:   Recent Labs   Lab 11/17/22  0514 11/18/22  0431   WBC 20.44* 15.65*   HGB 12.9* 12.5*   HCT 44.9 42.4    161       Respiratory Culture:   Recent Labs   Lab 11/15/22  0954   GSRESP <10 epithelial cells per low power field.  Rare  WBC's  Rare yeast   RESPIRATORYC No S aureus or Pseudomonas isolated.  BELA DUBLINIENSIS  Few  *       Urine Culture: No results for input(s): LABURIN in the last 4320 hours.  Urine Studies:   Recent Labs   Lab 11/10/22  2021 11/15/22  0141   COLORU  --  Yellow   APPEARANCEUA  --  Clear   PHUR  --  8.0   SPECGRAV  --  1.015   PROTEINUA  --  Trace*   GLUCUA  --  3+*   KETONESU  --  Negative   BILIRUBINUA  --  Negative   OCCULTUA  --  3+*   NITRITE  --  Negative   UROBILINOGEN Normal  --    LEUKOCYTESUR Trace* 3+*   RBCUA 6* >100*   WBCUA 6* 2   BACTERIA None Seen Occasional   SQUAMEPITHEL  --  1   HYALINECASTS  --  1       Wound Culture: No results for input(s): LABAERO in the last 4320 hours.    Significant Imaging: I have reviewed all pertinent imaging results/findings within the past 24 hours.

## 2022-11-18 NOTE — ASSESSMENT & PLAN NOTE
Diffuse SAH noted on CTH after concern for seizures over the weekend. No known trauma, though INR was supra-theraputic.  PCC/vit K(coumadin) and DDAVP (ASA) given prior to transport    Trending daily coags (PT/INR, aPTT)  CTA head read as no concern for Aneurysm/avm   MRI brain without, MRA brain without showing consistent stable bleed as prior CTH  NSGY s/o at this time; not a surgical candidate d/t neurologic status and instability  vasc neuro following

## 2022-11-18 NOTE — PLAN OF CARE
[facility] Care Plan    POC reviewed with Enrico Burnett and family at 1400. Pt verbalized understanding. Questions and concerns addressed. No acute events today. Pt progressing toward goals. Will continue to monitor. See below and flowsheets for full assessment and VS info.     Febrile last PM and today. Blood cultures X2 sent. No changes in status from before today. Family to bedside 1500, awaiting son to arrive from out of state.    Stroke booklet at bedside, discussed with family.          Is this a stroke patient? yes- Stroke booklet reviewed with patient, risk factors identified for patient and stroke booklet remains at bedside for ongoing education.     Neuro:  [place]ron Coma Scale  Best Eye Response: 2-->(E2) to pain  Best Motor Response: 4-->(M4) withdraws from pain  Best Verbal Response: 1-->(V1) none  [place] Coma Scale Score: 7  Assessment Qualifiers: patient intubated  Pupil PERRLA: yes     24 hr Temp:  [97.9 °F (36.6 °C)-102.2 °F (39 °C)]     CV:   Rhythm: atrial rhythm  BP goals:   SBP < 160  MAP > 65    Resp:   O2 Device (Oxygen Therapy): ventilator  Vent Mode: SIMV  Set Rate: 20 BPM  Oxygen Concentration (%): 40  Vt Set: 460 mL  PEEP/CPAP: 8 cmH20  Pressure Support: 10 cmH20    Plan: N/A    GI/:     Diet/Nutrition Received: NPO  Last Bowel Movement: 11/18/22  Voiding Characteristics: external catheter    Intake/Output Summary (Last 24 hours) at 11/18/2022 1546  Last data filed at 11/18/2022 1415  Gross per 24 hour   Intake 2011.54 ml   Output 2125 ml   Net -113.46 ml     Unmeasured Output  Urine Occurrence: 3  Stool Occurrence: 1  Pad Count: 2    Labs/Accuchecks:  Recent Labs   Lab 11/18/22 0431   WBC 15.65*   RBC 4.09*   HGB 12.5*   HCT 42.4         Recent Labs   Lab 11/18/22 0431 11/18/22  0956   * 154*   K 3.1* 3.9   CO2 35* 33*   * 108   BUN 76* 93*   CREATININE 1.5* 1.6*   ALKPHOS 83  --    ALT 36  --    AST 82*  --    BILITOT 1.9*  --       Recent Labs   Lab 11/18/22 0431    INR 1.3*   APTT 31.0      Recent Labs   Lab 11/15/22  0255   *   CPKMB 1.5   TROPONINI 0.205*   MB 0.2       Electrolytes: Electrolytes replaced  Accuchecks: Q4H    Gtts:   dextrose 10 % in water (D10W)         LDA/Wounds:  Lines/Drains/Airways       Drain  Duration                  NG/OG Tube Center mouth -- days    Male External Urinary Catheter 11/15/22 1100 Small 3 days         Rectal Tube 11/17/22 0445 rectal tube w/ balloon (indicate number of mLs) 1 day              Airway  Duration                  Airway - Non-Surgical -- days              Peripheral Intravenous Line  Duration                  Peripheral IV - Single Lumen 11/17/22 1549 20 G Anterior;Right Forearm <1 day         Peripheral IV - Single Lumen 11/17/22 1550 20 G Anterior;Left Forearm <1 day         Peripheral IV - Single Lumen 11/17/22 1553 18 G Anterior;Right Upper Arm <1 day         Peripheral IV - Single Lumen 11/17/22 1556 20 G Anterior;Left Antecubital <1 day                  Wounds: Yes  Wound care consulted: Yes

## 2022-11-18 NOTE — ASSESSMENT & PLAN NOTE
Palliative following.     -family to likely visit tomorrow to discuss goals of care; will continue to follow

## 2022-11-18 NOTE — PROGRESS NOTES
[facility] - Neuro Critical Care  Neurocritical Care  Progress Note    Admit Date: 11/14/2022  Service Date: 11/17/2022  Length of Stay: 3    Subjective:     Chief Complaint: SAH (subarachnoid hemorrhage)    History of Present Illness: 66 yo M w/ hx a fib/a flutter (on Warfarin), CHF transferred for eval and management for SAH. No notes sent with patient, unable to get exact events/timeline. It appears he was being treated for CHF, had unsuccessful cardioversion on 11/8 and also staph bacteremia on 11/11. Over the weekend, had seizure acitivy over the weekend requiring intuabtion. CTH showed diffuse SAH, CTA read as no Aneurysm/avm. INR was supratheraputic a few days ago > 4, but had decreased to 2's last few days. No reports of trauma, possible spontaneuous in setting of supratheraputic INR. PCC and Vit K given for elevated INR. DDAVP given for ASA. Transferred to [facility] for further eval and management.         Hospital Course: 11/15/2022: weaning versed to off, continue formal EEG and Keppra. Echo done, EF 7%. Patient made DNR by family. Added diamox 500. Consulted ID for staph bacteremia, palliative.   11/16/2022: OSH hospital records received via print. Per OSH records, patient inbubated on 11/11, central line placed 11/14. Patient noted to have seizure-like activity at OSH although no EEG records were obtained. New TTE obtained here w/ EF 10-15%; lowered MAP goal to >60. A/C to SIMV; ABG with improving alkalosis; continuing diamox. Continuing abx for bacteremia; BC NGTD.   11/17/2022: Febrile t/o today with increased WBC; abx switch to Oxacillin per ID. CXR with improving pulmonary edema. ABGs consistent with improving alkalosis. Vent SIMV. Duonebs and CPT for mucus production. Cr increasing; will CTM. Keppra 500 BID.       Interval History:  Refer to hospital course above    Review of Systems   Unable to perform ROS: Intubated     Objective:     Vitals:  Temp: 99.9 °F (37.7 °C)  Pulse: 89  Rhythm: paced rhythm,  atrial rhythm  BP: 121/67  MAP (mmHg): 88  Resp: (!) 26  SpO2: 100 %  Oxygen Concentration (%): 40  O2 Device (Oxygen Therapy): ventilator  Vent Mode: SIMV  Set Rate: 20 BPM  Vt Set: 460 mL  Pressure Support: 10 cmH20  PEEP/CPAP: 8 cmH20  Peak Airway Pressure: 24 cmH2O  Mean Airway Pressure: 12 cmH20  Plateau Pressure: 0 cmH20    Temp  Min: 96.8 °F (36 °C)  Max: 100.8 °F (38.2 °C)  Pulse  Min: 87  Max: 115  BP  Min: 89/64  Max: 138/76  MAP (mmHg)  Min: 70  Max: 100  Resp  Min: 16  Max: 28  SpO2  Min: 96 %  Max: 100 %  Oxygen Concentration (%)  Min: 40  Max: 40    11/16 0701 - 11/17 0700  In: 2406.1   Out: 950 [Urine:950]   Unmeasured Output  Urine Occurrence: 3  Stool Occurrence: 3  Pad Count: 2       Physical Exam  Physical Exam  Constitutional:       Appearance: He is obese. He is ill-appearing.      Comments: Intubated; unresponsive. Not on sedation   HENT:      Head: Normocephalic and atraumatic.      Right Ear: External ear normal.      Ears:      Comments: Necrotic wound present on L ear      Nose: Nose normal.      Mouth/Throat:      Mouth: Mucous membranes are moist.      Comments: ETT in place  Eyes:      Conjunctiva/sclera: Conjunctivae normal.   Cardiovascular:      Rate and Rhythm: Normal rate. Rhythm irregular.      Heart sounds: Normal heart sounds.   Pulmonary:      Breath sounds: Normal breath sounds.      Comments: Ventilated breath sounds equal bilaterally   Abdominal:      General: Abdomen is flat. There is no distension.      Palpations: Abdomen is soft.   Musculoskeletal:      Cervical back: Neck supple.      Right lower leg: No edema.      Left lower leg: No edema.      Comments: Multiple wounds over bilateral LE; dressings CDI. WC following    Skin:     General: Skin is warm and dry. Wounds and multiple keratosis throughout bilateral UE and hands.      Capillary Refill: Capillary refill takes less than 2 seconds.   Neurological:      Comments: Eyes open with tactile and verbal stimulation.  Persistently difficult to arouse. No response to painful stimulus on UE, no withdrawal; weak withdrawal to painful stimulus on LE.        Medications:  Continuousdextrose 10 % in water (D10W)    Scheduledamiodarone, 200 mg, Daily  famotidine, 20 mg, BID  heparin (porcine), 5,000 Units, Q8H  insulin aspart U-100, 4 Units, 6 times per day  levetiracetam IV, 500 mg, Q12H  mupirocin, , BID  oxacillin 12 g in  mL CONTINUOUS INFUSION, 12 g, Q24H    PRNacetaminophen, 650 mg, Q6H PRN  albuterol-ipratropium, 3 mL, Q4H PRN  dextrose 10 % in water (D10W), , Continuous PRN  dextrose 10%, 12.5 g, PRN  dextrose 10%, 25 g, PRN  glucagon (human recombinant), 1 mg, PRN  insulin aspart U-100, 1-10 Units, Q4H PRN  labetalol, 10 mg, Q4H PRN  magnesium oxide, 800 mg, PRN  magnesium oxide, 800 mg, PRN  potassium bicarbonate, 35 mEq, PRN  potassium bicarbonate, 50 mEq, PRN  potassium bicarbonate, 60 mEq, PRN  potassium, sodium phosphates, 2 packet, PRN  potassium, sodium phosphates, 2 packet, PRN  potassium, sodium phosphates, 2 packet, PRN  sodium chloride 0.9%, 10 mL, PRN        Diet  NPO      Assessment/Plan:     Neuro  * SAH (subarachnoid hemorrhage)  Diffuse SAH noted on CTH after concern for seizures over the weekend. No known trauma, though INR was supra-theraputic.  PCC/vit K(coumadin) and DDAVP (ASA) given prior to transport    Trending daily coags (PT/INR, aPTT)  CTA head read as no concern for Aneurysm/avm   MRI brain without, MRA brain without showing consistent stable bleed as prior CTH  NSGY s/o at this time; not a surgical candidate d/t neurologic status and instability  [facility] neuro following     Subarachnoid bleed  See SAH    Crowley coma scale total score 3-8  E1 V1 M3  Off sedation with difficulty aroising, although slightly opens eyes with stimulation. Continue AEDS.      Seizure  Arrived on kera, unable to view/assess home meds or meds prior to arrival. OSH documented seizure like activity but no EEG records  obtained.     Off fentanyl and versed with continued difficulty arousing   Keppra 500 bid IV  Formal EEG without seizure activity     Pulmonary  Acute respiratory failure with hypoxia and hypercapnia  Arrived on vent, PAo2 60% with peep 5. Bilateral effusion on CT chest  Assess and wean fiO2 as able  Alkalosis improving on ABGs  Vent SIMV; following ABGs as indicated         Cardiac/Vascular  Congestive heart failure  Patient is identified as having Systolic (HFrEF) heart failure that is Chronic. CHF is currently uncontrolled due to Continued edema of extremities, Dyspnea not returned to baseline after multiple doses of IV diuretic and Pulmonary edema/pleural effusion on CXR. Latest ECHO performed and demonstrates- Results for orders placed during the hospital encounter of 11/14/22    Echo    Interpretation Summary  · The left ventricle is normal in size with severely decreased systolic function.  · The estimated ejection fraction is 10-15%.  · There is left ventricular global hypokinesis. No thrombus seen.  · Left ventricular diastolic dysfunction.  · Normal right ventricular size with mildly to moderately reduced right ventricular systolic function.  · Severe left atrial enlargement.  · Mechanically ventilated; cannot use inferior caval vein diameter to estimate central venous pressure.  . Continue acetazolamide (restart GDMT when stable) and monitor clinical status closely. Monitor on telemetry. Patient is off CHF pathway.  Monitor strict Is&Os and daily weights.  Place on fluid restriction of tube feedings only. Continue to stress to patient importance of self efficacy and  on diet for CHF. Last BNP reviewed- and noted below   Recent Labs   Lab 11/15/22  0016   *   .      Elevated troponin  Elevated prior to arrival, will trend     Atrial fibrillation  Rate stable   Amio cont.   Began SQH DVT ppx     Hypotension  Unsure of cause on admissed, it was mentioned when calling for transfer that patient  may be bacteremic/sepsie. Aggressive diuresis over past few weeks/days, hypovolemia. AEDs versed gtt, and possible propofol prior to transfer. Required Levo for SBP > 100 and MAP > 65    -goal MAP>60 in setting of HFrEF EF 10-15%  -off levo gtt; hypotension resolved        Combined systolic and diastolic congestive heart failure  Unable to view any documentation from OSH, it was mentioned that EF was 20-25%, and diastolic function was also decreased. [facility] with EF 10-15%.     HDS without levo gtt   EKG w/ atrial flutter and AV block; follow-up EKG with AF (without RVR)  -It did appear patient may have been on Entresto at some point, so possible acute on chronic failure    -improving pulmonary edema on CXR 11/17        Renal/  Metabolic alkalosis  Bicarb > 40, concern for contraction alkalosis in setting of aggressive diuressis; improved with diamox  Continuing to follow CMP and ABGs as indicated; improving on ABG  Lasix held; pulm edema improved on CXR 11/17    ROSEMARIE (acute kidney injury)  ROSEMARIE vs ROSEMARIE on CKD  follow I/O, crt/bun      ID  MSSA bacteremia  Blood Culture NGTD  WBC uptrending and febrile t/o today   Zosyn, Vanc switched to Oxacillin per ID       Palliative Care  Advance care planning  See palliative care encounter     Palliative care encounter  Palliative following.     -family to likely visit tomorrow to discuss goals of care; will continue to follow           The patient is being Prophylaxed for:  Venous Thromboembolism with: Chemical  Stress Ulcer with: H2B  Ventilator Pneumonia with: chlorhexidine oral care    Activity Orders          Turn patient starting at 11/15 0000    Elevate HOB starting at 11/14 2326    Diet NPO: NPO starting at 11/14 2326        Partial Code    Abby Leyva MD  Neurocritical Care  [facility] - Neuro Critical Care

## 2022-11-18 NOTE — PT/OT/SLP PROGRESS
Occupational Therapy   Treatment    Name: Enrico Burnett  MRN: [id number]  Admitting Diagnosis:  SAH (subarachnoid hemorrhage)       Recommendations:     Discharge Recommendations:  (pending extubation)  Discharge Equipment Recommendations:   (pending extubation)  Barriers to discharge:  None    Assessment:     Enrico Burnett is a 67 y.o. male with a medical diagnosis of SAH (subarachnoid hemorrhage).  He presents with performance deficits affecting function are weakness, abnormal tone, impaired cognition, impaired endurance, impaired coordination, decreased coordination, impaired sensation, decreased ROM, decreased upper extremity function, impaired self care skills, impaired functional mobility, decreased lower extremity function, decreased safety awareness, gait instability, impaired balance, impaired cardiopulmonary response to activity.     Rehab Prognosis:  Good; patient would benefit from acute skilled OT services to address these deficits and reach maximum level of function.       Plan:     Patient to be seen 2 x/week to address the above listed problems via cognitive retraining, sensory integration, neuromuscular re-education, therapeutic exercises, therapeutic activities, self-care/home management  Plan of Care Expires: 12/13/22  Plan of Care Reviewed with: patient    Subjective   Patient:  Nonverbal; intubated  Pain/Comfort:  Pain Rating 1: 0/10  Pain Rating Post-Intervention 1: 0/10    Objective:     Communicated with: Nurse prior to session.  Patient found supine with bed alarm, restraints, SCD, telemetry, ventilator, pulse ox (continuous), EEG, pressley catheter, peripheral IV, blood pressure cuff, bowel management system upon OT entry to room.    General Precautions: Standard, aspiration, fall, NPO   Orthopedic Precautions:N/A   Braces: N/A  Respiratory Status:  ventilator     Occupational Performance:     Bed Mobility:    Patient completed Rolling/Turning to Left with  total assistance  Patient  completed Rolling/Turning to Right with total assistance     Functional Mobility/Transfers:  Dependent drawsheet transfers    Activities of Daily Living:  Feeding:  NPO    Grooming: total assistance while supine    [facility] 6 Click ADL: 6    Treatment & Education:  Patient education provided on role of OT.  Daily orientation provided.  Manual lymph drainage techniques performed prior to ROM.  PROM performed bilateral UE/LEs one set x 10 rep in all planes of motion with stretches provided at end range; sustained stretch provided for ankle dorsiflexion.   Provided multi-sensory stimulation to prevent sensory deprivation and improve clinical outcomes.  Positioning provided for midline orientation with bilateral UEs elevated and heels lifted off mattress; positioning provided to prevent flexor synergy pattern in UE (internal rotation and adduction) to decrease risk of post-stroke hemiplegic pain.   Gentle cervical rotation provided.   Family not present during the session.  Continued education, patient/ family training recommended.      Patient left supine with all lines intact, call button in reach, bed alarm on, and restraints reapplied at end of session    GOALS:   Multidisciplinary Problems       Occupational Therapy Goals          Problem: Occupational Therapy    Goal Priority Disciplines Outcome Interventions   Occupational Therapy Goal     OT, PT/OT Ongoing, Progressing    Description: Goals set 11/15 to be addressed for 14 days with expiration date, 11/29:  Patient will increase functional independence with ADLs by performing:    Patient will demonstrate rolling to the right with max assist.  Not met   Patient will demonstrate rolling to the left with max assist.   Not met  Patient will demonstrate supine -sit with max  assist.   Not met  Patient will demonstrate stand pivot transfers with max assist.   Not met  Patient will demonstrate grooming while seated with max assist.   Not met  Patient will demonstrate  upper body dressing with max assist while seated EOB.   Not met  Patient will demonstrate lower body dressing with max assist while seated EOB.   Not met  Patient will demonstrate toileting with max assist.   Not met  Patient will demonstrate bathing while seated EOB with max assist.   Not met  Patient's family / caregiver will demonstrate independence and safety with assisting patient with self-care skills and functional mobility.     Not met  Patient's family / caregiver will demonstrate independence with providing ROM and changes in bed positioning.   Not met  Patient and/or patient's family will verbalize understanding of stroke prevention guidelines, personal risk factors and stroke warning signs via teachback method.  Not met                                  Time Tracking:     OT Date of Treatment: 11/18/22  OT Start Time: 0335  OT Stop Time: 0358  OT Total Time (min): 23 min    Billable Minutes:Neuromuscular Re-education 23    OT/EDDI: OT          11/18/2022

## 2022-11-18 NOTE — ASSESSMENT & PLAN NOTE
Blood Culture NGTD  WBC uptrending and febrile t/o today   Zosyn, Vanc switched to Oxacillin per ID

## 2022-11-18 NOTE — ASSESSMENT & PLAN NOTE
Unable to view any documentation from OSH, it was mentioned that EF was 20-25%, and diastolic function was also decreased. Repeat TTE at [facility] with EF 10-15%.     HDS without levo gtt   EKG w/ atrial flutter and AV block; follow-up EKG with AF (without RVR)  -It did appear patient may have been on Entresto at some point, so possible acute on chronic failure    -improving pulmonary edema on CXR 11/17

## 2022-11-18 NOTE — PLAN OF CARE
[facility] Care Plan    POC reviewed with Enrico Burentt at 0500. Pt unable to verbalize understanding d/t current clinical condition.No acute events overnight. Pt progressing toward goals. Will continue to monitor. See below and flowsheets for full assessment and VS info.     K and phos remains low replaced per oral prn replacement orders  SSI and scheduled insulin administered for hyperglycermia  VSS  Neuro exam remains stable. Minimal spontaneous movement noted overnight.  Pt continues to be febrile. Prn tylenol administered x2 overnight       Is this a stroke patient? yes- Stroke booklet reviewed with patient, risk factors identified for patient and stroke booklet remains at bedside for ongoing education.     Neuro:  Byron Coma Scale  Best Eye Response: 1-->(E1) none  Best Motor Response: 4-->(M4) withdraws from pain  Best Verbal Response: 1-->(V1) none  Byron Coma Scale Score: 6  Assessment Qualifiers: patient intubated  Pupil PERRLA: yes     24hr Temp:  [96.8 °F (36 °C)-100.9 °F (38.3 °C)]     CV:   Rhythm: paced rhythm, atrial rhythm  BP goals:   SBP < 160  MAP >  60    Resp:   O2 Device (Oxygen Therapy): ventilator  Vent Mode: SIMV  Set Rate: 20 BPM  Oxygen Concentration (%): 40  Vt Set: 460 mL  PEEP/CPAP: 8 cmH20  Pressure Support: 10 cmH20    Plan: wean to extubate    GI/:     Diet/Nutrition Received: tube feeding, NPO  Last Bowel Movement: 11/17/22  Voiding Characteristics: external catheter, incontinence    Intake/Output Summary (Last 24 hours) at 11/18/2022 0234  Last data filed at 11/18/2022 0203  Gross per 24 hour   Intake 2572.81 ml   Output 1985 ml   Net 587.81 ml     Unmeasured Output  Urine Occurrence: 3  Stool Occurrence: 1  Pad Count: 2    Labs/Accuchecks:  Recent Labs   Lab 11/17/22  0514   WBC 20.44*   RBC 4.19*   HGB 12.9*   HCT 44.9         Recent Labs   Lab 11/17/22  0514 11/17/22  0833 11/18/22  0018   *   < > 153*   K 3.2*   < > 3.2*   CO2 34*   < > 32*      < > 109    BUN 90*   < > 85*   CREATININE 1.8*   < > 1.6*   ALKPHOS 88  --   --    ALT 30  --   --    AST 68*  --   --    BILITOT 2.4*  --   --     < > = values in this interval not displayed.      Recent Labs   Lab 11/17/22  0514   INR 1.2   APTT 28.7      Recent Labs   Lab 11/15/22  0255   *   CPKMB 1.5   TROPONINI 0.205*   MB 0.2       Electrolytes: Electrolytes replaced  Accuchecks: Q4H    Gtts:   dextrose 10 % in water (D10W)         LDA/Wounds:  Lines/Drains/Airways       Drain  Duration                  NG/OG Tube Center mouth -- days    Male External Urinary Catheter 11/15/22 1100 Small 2 days         Rectal Tube 11/17/22 0445 rectal tube w/ balloon (indicate number of mLs) <1 day              Airway  Duration                  Airway - Non-Surgical -- days              Peripheral Intravenous Line  Duration                  Peripheral IV - Single Lumen 11/17/22 1549 20 G Anterior;Right Forearm <1 day         Peripheral IV - Single Lumen 11/17/22 1550 20 G Anterior;Left Forearm <1 day         Peripheral IV - Single Lumen 11/17/22 1553 18 G Anterior;Right Upper Arm <1 day         Peripheral IV - Single Lumen 11/17/22 1556 20 G Anterior;Left Antecubital <1 day                  Wounds: Yes  Wound care consulted: Yes   Problem: Adult Inpatient Plan of Care  Goal: Plan of Care Review  Outcome: Ongoing, Progressing  Goal: Patient-Specific Goal (Individualized)  Outcome: Ongoing, Progressing  Goal: Absence of Hospital-Acquired Illness or Injury  Outcome: Ongoing, Progressing  Goal: Optimal Comfort and Wellbeing  Outcome: Ongoing, Progressing     Problem: Adjustment to Illness (Stroke, Hemorrhagic)  Goal: Optimal Coping  Outcome: Ongoing, Progressing     Problem: Bowel Elimination Impaired (Stroke, Hemorrhagic)  Goal: Effective Bowel Elimination  Outcome: Ongoing, Progressing     Problem: Cerebral Tissue Perfusion (Stroke, Hemorrhagic)  Goal: Optimal Cerebral Tissue Perfusion  Outcome: Ongoing, Progressing     Problem:  Pain (Stroke, Hemorrhagic)  Goal: Acceptable Pain Control  Outcome: Ongoing, Progressing     Problem: Respiratory Compromise (Stroke, Hemorrhagic)  Goal: Effective Oxygenation and Ventilation  Outcome: Ongoing, Progressing     Problem: Urinary Elimination Impaired (Stroke, Hemorrhagic)  Goal: Effective Urinary Elimination  Outcome: Ongoing, Progressing     Problem: Fluid and Electrolyte Imbalance (Acute Kidney Injury/Impairment)  Goal: Fluid and Electrolyte Balance  Outcome: Ongoing, Progressing     Problem: Oral Intake Inadequate (Acute Kidney Injury/Impairment)  Goal: Optimal Nutrition Intake  Outcome: Ongoing, Progressing     Problem: Renal Function Impairment (Acute Kidney Injury/Impairment)  Goal: Effective Renal Function  Outcome: Ongoing, Progressing     Problem: Skin Injury Risk Increased  Goal: Skin Health and Integrity  Outcome: Ongoing, Progressing     Problem: Device-Related Complication Risk (Mechanical Ventilation, Invasive)  Goal: Optimal Device Function  Outcome: Ongoing, Progressing     Problem: Inability to Wean (Mechanical Ventilation, Invasive)  Goal: Mechanical Ventilation Liberation  Outcome: Ongoing, Progressing     Problem: Nutrition Impairment (Mechanical Ventilation, Invasive)  Goal: Optimal Nutrition Delivery  Outcome: Ongoing, Progressing     Problem: Skin and Tissue Injury (Mechanical Ventilation, Invasive)  Goal: Absence of Device-Related Skin and Tissue Injury  Outcome: Ongoing, Progressing     Problem: Ventilator-Induced Lung Injury (Mechanical Ventilation, Invasive)  Goal: Absence of Ventilator-Induced Lung Injury  Outcome: Ongoing, Progressing     Problem: Device-Related Complication Risk (Artificial Airway)  Goal: Optimal Device Function  Outcome: Ongoing, Progressing     Problem: Skin and Tissue Injury (Artificial Airway)  Goal: Absence of Device-Related Skin or Tissue Injury  Outcome: Ongoing, Progressing     Problem: Noninvasive Ventilation Acute  Goal: Effective Unassisted  Ventilation and Oxygenation  Outcome: Ongoing, Progressing     Problem: Fall Injury Risk  Goal: Absence of Fall and Fall-Related Injury  Outcome: Ongoing, Progressing     Problem: Restraint, Nonbehavioral (Nonviolent)  Goal: Absence of Harm or Injury  Outcome: Ongoing, Progressing     Problem: Infection  Goal: Absence of Infection Signs and Symptoms  Outcome: Ongoing, Progressing     Problem: Impaired Wound Healing  Goal: Optimal Wound Healing  Outcome: Ongoing, Progressing     Problem: Coping Ineffective  Goal: Effective Coping  Outcome: Ongoing, Progressing     Problem: Adult Inpatient Plan of Care  Goal: Readiness for Transition of Care  Outcome: Ongoing, Not Progressing     Problem: Cognitive Impairment (Stroke, Hemorrhagic)  Goal: Optimal Cognitive Function  Outcome: Ongoing, Not Progressing     Problem: Communication Impairment (Stroke, Hemorrhagic)  Goal: Effective Communication Skills  Outcome: Ongoing, Not Progressing     Problem: Functional Ability Impaired (Stroke, Hemorrhagic)  Goal: Optimal Functional Ability  Outcome: Ongoing, Not Progressing     Problem: Sensorimotor Impairment (Stroke, Hemorrhagic)  Goal: Improved Sensorimotor Function  Outcome: Ongoing, Not Progressing     Problem: Swallowing Impairment (Stroke, Hemorrhagic)  Goal: Optimal Eating and Swallowing Without Aspiration  Outcome: Ongoing, Not Progressing     Problem: Communication Impairment (Mechanical Ventilation, Invasive)  Goal: Effective Communication  Outcome: Ongoing, Not Progressing     Problem: Communication Impairment (Artificial Airway)  Goal: Effective Communication  Outcome: Ongoing, Not Progressing

## 2022-11-18 NOTE — PLAN OF CARE
Goals remain appropriate.  Problem: Occupational Therapy  Goal: Occupational Therapy Goal  Description: Goals set 11/15 to be addressed for 14 days with expiration date, 11/29:  Patient will increase functional independence with ADLs by performing:    Patient will demonstrate rolling to the right with max assist.  Not met   Patient will demonstrate rolling to the left with max assist.   Not met  Patient will demonstrate supine -sit with max  assist.   Not met  Patient will demonstrate stand pivot transfers with max assist.   Not met  Patient will demonstrate grooming while seated with max assist.   Not met  Patient will demonstrate upper body dressing with max assist while seated EOB.   Not met  Patient will demonstrate lower body dressing with max assist while seated EOB.   Not met  Patient will demonstrate toileting with max assist.   Not met  Patient will demonstrate bathing while seated EOB with max assist.   Not met  Patient's family / caregiver will demonstrate independence and safety with assisting patient with self-care skills and functional mobility.     Not met  Patient's family / caregiver will demonstrate independence with providing ROM and changes in bed positioning.   Not met  Patient and/or patient's family will verbalize understanding of stroke prevention guidelines, personal risk factors and stroke warning signs via teachback method.  Not met             Outcome: Ongoing, Progressing

## 2022-11-18 NOTE — PHYSICIAN QUERY
PT Name: Enrico Burnett  MR #: [id number]    DOCUMENTATION CLARIFICATION     CDS/:  Mark Egan RN, CDS                   Contact Information:  [email or web address]    This form is a permanent document in the medical record.     Query Date: November 18, 2022    By submitting this query, we are merely seeking further clarification of documentation. Please utilize your independent clinical judgment when addressing the question(s) below.    Clinical Information Location in Medical Record   66 yo M, transferred for eval and management for SAH.    SAH (subarachnoid hemorrhage)    INR was supratheraputic a few days ago > 4, but had decreased to 2's last few days,.     No reports of trauma, possible spontaneuous in setting of supratheraputic INR.     PCC and Vit K given for elevated INR. DDAVP given for ASA.    SAH in setting of supratheraputic INR H&P 11/14   Multifocal ICH w/ noted IVH and possible SAH component in the setting of supra-therapeutic INR. No reported history of trauma/falls    SAH (subarachnoid hemorrhage)  66 y/o male with Rambo SAH non traumatic could be due to supertherapeutic INR of 4     Continue to monitor coags given high probability that elevated INR contributed to diffuse brain hemorrhage. VN 11/15            NC PN 11/16   PT 40.3  INR 4.10      PT 25.6  INR 2.29    PTT 36.5 Labs 11/7- prev encounter: [facility]    Labs 11/14      Labs 11/15       Provider, please clarify if there is any clinical correlation between SAH and Supertherapeutic INR           Are the conditions:      [ x ] Due to or associated with each other     [  ] Unrelated to each other     [  ] Other explanation (Please Specify): ______________     [  ] Clinically Undetermined                                                                                 Please document in your progress notes daily for the duration of treatment until resolved and include in your discharge summary.

## 2022-11-18 NOTE — SUBJECTIVE & OBJECTIVE
Interval History: ". No acute events overnight. Low grade fever and leukocytosis noted. Awaiting goals of care discussion.    Review of Systems   Unable to perform ROS: Intubated   Objective:     Vital Signs (Most Recent):  Temp: (!) 100.8 °F (38.2 °C) (11/18/22 1201)  Pulse: 107 (11/18/22 1201)  Resp: (!) 21 (11/18/22 1201)  BP: 90/64 (11/18/22 1201)  SpO2: 98 % (11/18/22 1201) Vital Signs (24h Range):  Temp:  [97.9 °F (36.6 °C)-100.9 °F (38.3 °C)] 100.8 °F (38.2 °C)  Pulse:  [] 107  Resp:  [19-39] 21  SpO2:  [95 %-100 %] 98 %  BP: ()/(51-84) 90/64     Weight: 100 kg (220 lb 7.4 oz)  Body mass index is 29.9 kg/m².    Estimated Creatinine Clearance: 56.4 mL/min (A) (based on SCr of 1.6 mg/dL (H)).    Physical Exam  Vitals reviewed.   Constitutional:       Appearance: He is well-developed.      Interventions: He is intubated.   HENT:      Head: Normocephalic and atraumatic.      Right Ear: External ear normal.      Left Ear: External ear normal.   Eyes:      Conjunctiva/sclera: Conjunctivae normal.   Neck:      Thyroid: No thyromegaly.   Cardiovascular:      Rate and Rhythm: Normal rate and regular rhythm.      Heart sounds: Normal heart sounds. No murmur heard.  Pulmonary:      Effort: Pulmonary effort is normal. He is intubated.      Breath sounds: Normal breath sounds. No wheezing or rales.   Chest:      Comments: Left sided cardiac device without erythema of the skin.  Abdominal:      General: Bowel sounds are normal.      Palpations: Abdomen is soft. There is no mass.      Tenderness: There is no abdominal tenderness. There is no rebound.   Musculoskeletal:      Cervical back: Normal range of motion and neck supple.   Lymphadenopathy:      Cervical: No cervical adenopathy.   Skin:     General: Skin is warm and dry.      Findings: Bruising present.   Neurological:      Mental Status: He is lethargic.      Comments: Does not respond to verbal or tactile stimuli       Significant Labs: Blood Culture:    Recent Labs   Lab 11/15/22  0018 11/15/22  0019   LABBLOO No Growth to date  No Growth to date  No Growth to date  No Growth to date No Growth to date  No Growth to date  No Growth to date  No Growth to date       BMP:   Recent Labs   Lab 11/18/22  0956 11/18/22  0957   *  --    *  --    K 3.9  --      --    CO2 33*  --    BUN 93*  --    CREATININE 1.6*  --    CALCIUM 8.8  --    MG  --  2.4       CBC:   Recent Labs   Lab 11/17/22  0514 11/18/22  0431   WBC 20.44* 15.65*   HGB 12.9* 12.5*   HCT 44.9 42.4    161       Respiratory Culture:   Recent Labs   Lab 11/15/22  0954   GSRESP <10 epithelial cells per low power field.  Rare  WBC's  Rare yeast   RESPIRATORYC No S aureus or Pseudomonas isolated.  BELA DUBLINIENSIS  Few  *       Urine Culture: No results for input(s): LABURIN in the last 4320 hours.  Urine Studies:   Recent Labs   Lab 11/10/22  2021 11/15/22  0141   COLORU  --  Yellow   APPEARANCEUA  --  Clear   PHUR  --  8.0   SPECGRAV  --  1.015   PROTEINUA  --  Trace*   GLUCUA  --  3+*   KETONESU  --  Negative   BILIRUBINUA  --  Negative   OCCULTUA  --  3+*   NITRITE  --  Negative   UROBILINOGEN Normal  --    LEUKOCYTESUR Trace* 3+*   RBCUA 6* >100*   WBCUA 6* 2   BACTERIA None Seen Occasional   SQUAMEPITHEL  --  1   HYALINECASTS  --  1       Wound Culture: No results for input(s): LABAERO in the last 4320 hours.    Significant Imaging: I have reviewed all pertinent imaging results/findings within the past 24 hours.

## 2022-11-18 NOTE — ASSESSMENT & PLAN NOTE
E1 V1 M3  Off sedation with difficulty aroising, although slightly opens eyes with stimulation. Continue AEDS.

## 2022-11-18 NOTE — ASSESSMENT & PLAN NOTE
Arrived on vent, PAo2 60% with peep 5. Bilateral effusion on CT chest  Assess and wean fiO2 as able  Alkalosis improving on ABGs  Vent SIMV; following ABGs as indicated

## 2022-11-19 NOTE — SUBJECTIVE & OBJECTIVE
Interval History:  Refer to hospital course above    Review of Systems   Unable to perform ROS: Intubated     Objective:     Vitals:  Temp: (!) 102.2 °F (39 °C)  Pulse: (!) 113  Rhythm: atrial rhythm  BP: (!) 161/67  MAP (mmHg): 97  Resp: 20  SpO2: 97 %  Oxygen Concentration (%): 40  O2 Device (Oxygen Therapy): ventilator  Vent Mode: SIMV  Set Rate: 20 BPM  Vt Set: 460 mL  Pressure Support: 10 cmH20  PEEP/CPAP: 8 cmH20  Peak Airway Pressure: 35 cmH2O  Mean Airway Pressure: 16 cmH20  Plateau Pressure: 0 cmH20    Temp  Min: 97.9 °F (36.6 °C)  Max: 102.4 °F (39.1 °C)  Pulse  Min: 93  Max: 133  BP  Min: 62/47  Max: 161/67  MAP (mmHg)  Min: 52  Max: 108  Resp  Min: 20  Max: 39  SpO2  Min: 95 %  Max: 100 %  Oxygen Concentration (%)  Min: 40  Max: 40    11/17 0701 - 11/18 0700  In: 2777.6   Out: 2000 [Urine:1550]   Unmeasured Output  Urine Occurrence: 3  Stool Occurrence: 1  Pad Count: 2       Physical Exam  Physical Exam  Constitutional:       Appearance: He is obese. He is ill-appearing.      Comments: Intubated; unresponsive. Not on sedation   HENT:      Head: Normocephalic and atraumatic.      Right Ear: External ear normal.      Ears:      Comments: Necrotic wound present on L ear      Nose: Nose normal.      Mouth/Throat:      Mouth: Mucous membranes are moist.      Comments: ETT in place  Eyes:      Conjunctiva/sclera: Conjunctivae normal.   Cardiovascular:      Rate and Rhythm: Tachycardia. Rhythm irregular.      Heart sounds: Normal heart sounds.   Pulmonary:      Breath sounds: Normal breath sounds.      Comments: Ventilated breath sounds equal bilaterally   Abdominal:      General: Abdomen is flat. There is no distension.      Palpations: Abdomen is soft.   Musculoskeletal:      Cervical back: Neck supple.      Right lower leg: No edema.      Left lower leg: No edema.      Comments: Multiple wounds over bilateral LE; dressings CDI. WC following    Skin:     General: Skin is warm and dry. Wounds and multiple  keratosis throughout bilateral UE and hands.      Capillary Refill: Capillary refill takes less than 2 seconds.   Neurological:      Comments: Eyes open with tactile and verbal stimulation. Persistently difficult to arouse. No response to painful stimulus on UE, no withdrawal; weak withdrawal to painful stimulus on LE.        Medications:  Continuousdextrose 10 % in water (D10W)  NORepinephrine bitartrate-D5W, Last Rate: 0.02 mcg/kg/min (11/18/22 1750)  Scheduledamiodarone in dextrose, ,   amiodarone, 200 mg, Daily  famotidine, 20 mg, BID  heparin (porcine), 5,000 Units, Q8H  insulin aspart U-100, 6 Units, 6 times per day  levetiracetam IV, 500 mg, Q12H  mupirocin, , BID  oxacillin 12 g in  mL CONTINUOUS INFUSION, 12 g, Q24H  PRNacetaminophen, 650 mg, Q6H PRN  albuterol-ipratropium, 3 mL, Q4H PRN  dextrose 10 % in water (D10W), , Continuous PRN  dextrose 10%, 12.5 g, PRN  dextrose 10%, 25 g, PRN  glucagon (human recombinant), 1 mg, PRN  insulin aspart U-100, 1-10 Units, Q4H PRN  labetalol, 10 mg, Q4H PRN  magnesium oxide, 800 mg, PRN  magnesium oxide, 800 mg, PRN  potassium bicarbonate, 35 mEq, PRN  potassium bicarbonate, 50 mEq, PRN  potassium bicarbonate, 60 mEq, PRN  potassium, sodium phosphates, 2 packet, PRN  potassium, sodium phosphates, 2 packet, PRN  potassium, sodium phosphates, 2 packet, PRN  sodium chloride 0.9%, 10 mL, PRN      Diet  NPO

## 2022-11-19 NOTE — ASSESSMENT & PLAN NOTE
Rate stable; although tachycardic this PM s/p amio 150mg bolus IV  Continue amio daily   Began SQH DVT ppx

## 2022-11-19 NOTE — ASSESSMENT & PLAN NOTE
Blood Culture NGTD  Febrile t/o today; WBC persistently high (stable)  Zosyn, Vanc switched to Oxacillin per ID

## 2022-11-19 NOTE — ASSESSMENT & PLAN NOTE
Rate stable t/o hospital course; episode of tachycardia 11/18 s/p amio 150mg bolus IV  Continue amio daily   Began SQH DVT ppx

## 2022-11-19 NOTE — ASSESSMENT & PLAN NOTE
Unable to view any documentation from OSH, it was mentioned that EF was 20-25%, and diastolic function was also decreased. Repeat TTE at [facility] with EF 10-15%.     Now requiring levo gtt for MAP>60  EKG w/ atrial flutter and AV block; follow-up EKG with AF (without RVR)  -It did appear patient may have been on Entresto at some point, so possible acute on chronic failure    -improving pulmonary edema on CXR 11/17

## 2022-11-19 NOTE — ASSESSMENT & PLAN NOTE
Blood Culture NGTD  Febrile t/o today but WBC is now downtrending  Osirissysumaya, Vanc switched to Oxacillin per ID

## 2022-11-19 NOTE — ASSESSMENT & PLAN NOTE
Unable to view any documentation from OSH, it was mentioned that EF was 20-25%, and diastolic function was also decreased. Repeat TTE at [facility] with EF 10-15%.     Now requiring levo gtt for MAP>60  EKG w/ atrial flutter and AV block; follow-up EKG with AF (without RVR)  -It did appear patient may have been on Entresto at some point, so possible acute on chronic failure    -improving pulmonary edema on CXR 11/17  -Increasing FW boluses and 1x dose Lasix 80 for hypernatremia

## 2022-11-19 NOTE — SUBJECTIVE & OBJECTIVE
Interval History:  Refer to hospital course above    Review of Systems   Unable to perform ROS: Intubated     Objective:     Vitals:  Temp: 99.5 °F (37.5 °C)  Pulse: 91  Rhythm: atrial rhythm  BP: 105/60  MAP (mmHg): 76  Resp: (!) 24  SpO2: 99 %  Oxygen Concentration (%): 40  O2 Device (Oxygen Therapy): ventilator  Vent Mode: SIMV  Set Rate: 20 BPM  Vt Set: 460 mL  Pressure Support: 10 cmH20  PEEP/CPAP: 8 cmH20  Peak Airway Pressure: 36 cmH2O  Mean Airway Pressure: 16 cmH20  Plateau Pressure: 0 cmH20    Temp  Min: 99.1 °F (37.3 °C)  Max: 102.4 °F (39.1 °C)  Pulse  Min: 78  Max: 124  BP  Min: 62/47  Max: 200/110  MAP (mmHg)  Min: 52  Max: 111  Resp  Min: 19  Max: 53  SpO2  Min: 95 %  Max: 100 %  Oxygen Concentration (%)  Min: 40  Max: 40    11/18 0701 - 11/19 0700  In: 1210   Out: 1400 [Urine:1150]   Unmeasured Output  Urine Occurrence: 1  Stool Occurrence: 1  Pad Count: 2         Physical Exam  Constitutional:       Appearance: He is obese. He is ill-appearing.      Comments: Intubated; opening eyes to verbal stimulation. Not on sedation   HENT:      Head: Normocephalic and atraumatic.      Right Ear: External ear normal.      Ears:      Comments: Necrotic wound present on L ear      Nose: Nose normal.      Mouth/Throat:      Mouth: Mucous membranes are moist.      Comments: ETT in place  Eyes:      Conjunctiva/sclera: Conjunctivae normal.   Cardiovascular:      Rate and Rhythm: Tachycardia. Rhythm irregular.      Heart sounds: Normal heart sounds.   Pulmonary:      Breath sounds: Normal breath sounds.      Comments: Ventilated breath sounds equal bilaterally   Abdominal:      General: Abdomen is flat. There is no distension.      Palpations: Abdomen is soft.   Musculoskeletal:      Cervical back: Neck supple.      Right lower leg: No edema.      Left lower leg: No edema.      Comments: Multiple wounds over bilateral LE; dressings CDI. WC following    Skin:     General: Skin is warm and dry. Wounds, skin tears and  multiple keratosis throughout bilateral UE and LE.      Capillary Refill: Capillary refill takes less than 2 seconds.   Neurological:      Comments: Eyes open with tactile and verbal stimulation. Although remains difficult to arouse. Withdrawal to painful stimulus on LUE; involuntary twitching movements but no withdrawal on the RUE.  Weak withdrawal to painful stimulus on LE.        Medications:  Continuousdextrose 10 % in water (D10W)  NORepinephrine bitartrate-D5W, Last Rate: 0.02 mcg/kg/min (11/19/22 1105)  Scheduledamiodarone, 200 mg, Daily  famotidine, 20 mg, BID  furosemide (LASIX) injection, 80 mg, Once  heparin (porcine), 5,000 Units, Q8H  insulin aspart U-100, 6 Units, 6 times per day  levetiracetam IV, 500 mg, Q12H  mupirocin, , BID  oxacillin 12 g in  mL CONTINUOUS INFUSION, 12 g, Q24H  PRNacetaminophen, 650 mg, Q6H PRN  albuterol-ipratropium, 3 mL, Q4H PRN  dextrose 10 % in water (D10W), , Continuous PRN  dextrose 10%, 12.5 g, PRN  dextrose 10%, 25 g, PRN  glucagon (human recombinant), 1 mg, PRN  insulin aspart U-100, 1-10 Units, Q4H PRN  labetalol, 10 mg, Q4H PRN  sodium chloride 0.9%, 10 mL, PRN      Diet  NPO

## 2022-11-19 NOTE — ASSESSMENT & PLAN NOTE
E1 V1 M3  Off sedation with difficulty aroising, although slightly opens eyes with verbal stimulation. Continue AEDS.

## 2022-11-19 NOTE — ASSESSMENT & PLAN NOTE
Bicarb > 40, concern for contraction alkalosis in setting of aggressive diuressis; improved with diamox  Continuing to follow CMP and ABGs as indicated; improving on ABG  Lasix held; pulm edema improved on CXR 11/17  Alkalosis improved on subsequent ABGs

## 2022-11-19 NOTE — ASSESSMENT & PLAN NOTE
67 year admitted to outside hospital for CHF exacerbation.  Patient noted to be altered and brain imaging showed intracranial bleed. His hospital course was complicated by MSSA bacteremia. Source unclear. He has some superficial bruising to his skin in the lower extremities.  Also possible the bacteremia is nosocomial.  Unclear how long patient may have been bacteremic.  Blood cultures obtained at [facility] are now negative.    1. Continue oxacillin.    2. SIVA if in line with family goals of care.

## 2022-11-19 NOTE — SUBJECTIVE & OBJECTIVE
Interval History:     No adverse events    Review of Systems   Unable to perform ROS: Intubated   Objective:     Vital Signs (Most Recent):  Temp: 99.7 °F (37.6 °C) (11/19/22 1001)  Pulse: 84 (11/19/22 1001)  Resp: (!) 23 (11/19/22 1001)  BP: (!) 105/59 (11/19/22 1001)  SpO2: 99 % (11/19/22 1001)   Vital Signs (24h Range):  Temp:  [99.7 °F (37.6 °C)-102.4 °F (39.1 °C)] 99.7 °F (37.6 °C)  Pulse:  [] 84  Resp:  [19-42] 23  SpO2:  [96 %-99 %] 99 %  BP: ()/() 105/59     Weight: 100 kg (220 lb 7.4 oz)  Body mass index is 29.9 kg/m².    Estimated Creatinine Clearance: 47.5 mL/min (A) (based on SCr of 1.9 mg/dL (H)).    Physical Exam  Vitals reviewed.   Constitutional:       Appearance: He is well-developed.      Interventions: He is intubated.   HENT:      Head: Normocephalic and atraumatic.      Right Ear: External ear normal.      Left Ear: External ear normal.   Eyes:      Conjunctiva/sclera: Conjunctivae normal.   Neck:      Thyroid: No thyromegaly.   Cardiovascular:      Rate and Rhythm: Normal rate and regular rhythm.      Heart sounds: Normal heart sounds. No murmur heard.  Pulmonary:      Effort: Pulmonary effort is normal. He is intubated.      Breath sounds: Normal breath sounds. No wheezing or rales.   Chest:      Comments: Left sided cardiac device without erythema of the skin.  Abdominal:      General: Bowel sounds are normal.      Palpations: Abdomen is soft. There is no mass.      Tenderness: There is no abdominal tenderness. There is no rebound.   Musculoskeletal:      Cervical back: Normal range of motion and neck supple.   Lymphadenopathy:      Cervical: No cervical adenopathy.   Skin:     General: Skin is warm and dry.      Findings: Bruising present.   Neurological:      Mental Status: He is lethargic.      Comments: Does not respond to verbal or tactile stimuli       Significant Labs:   Microbiology Results (last 7 days)       Procedure Component Value Units Date/Time    Blood  culture [[id number]] Collected: 11/15/22 0019    Order Status: Completed Specimen: Blood from Peripheral, Forearm, Left Updated: 11/19/22 0612     Blood Culture, Routine No Growth to date      No Growth to date      No Growth to date      No Growth to date      No Growth to date    Narrative:      Blood cultures x 2 different sites. 4 bottles total. Please  draw cultures before administering antibiotics.    Blood culture [[id number]] Collected: 11/15/22 0018    Order Status: Completed Specimen: Blood from Peripheral, Lower Leg, Right Updated: 11/19/22 0612     Blood Culture, Routine No Growth to date      No Growth to date      No Growth to date      No Growth to date      No Growth to date    Narrative:      Blood cultures from 2 different sites. 4 bottles total.  Please draw before starting antibiotics.    Blood culture [[id number]] Collected: 11/18/22 1439    Order Status: Completed Specimen: Blood from Peripheral, Upper Arm, Right Updated: 11/19/22 0115     Blood Culture, Routine No Growth to date    Blood culture [[id number]] Collected: 11/18/22 1439    Order Status: Completed Specimen: Blood from Peripheral, Upper Arm, Left Updated: 11/19/22 0115     Blood Culture, Routine No Growth to date    Culture, Respiratory with Gram Stain [[id number]]  (Abnormal) Collected: 11/15/22 0954    Order Status: Completed Specimen: Respiratory from Endotracheal Aspirate Updated: 11/17/22 1356     Respiratory Culture No S aureus or Pseudomonas isolated.      BELA DUBLINIENSIS  Few       Gram Stain (Respiratory) <10 epithelial cells per low power field.     Gram Stain (Respiratory) Rare  WBC's     Gram Stain (Respiratory) Rare yeast    Narrative:      Mini-BAL.            Significant Imaging: I have reviewed all pertinent imaging results/findings within the past 24 hours.

## 2022-11-19 NOTE — PLAN OF CARE
Psychiatric Care Plan  POC reviewed with pt and family. Family made decision to make pt DNR and is considering comfort measures. Pt stable at this time, off levophed.           Is this a stroke patient? no    Neuro:  Jansen Coma Scale  Best Eye Response: 2-->(E2) to pain  Best Motor Response: 4-->(M4) withdraws from pain  Best Verbal Response: 1-->(V1) none  Jansen Coma Scale Score: 7  Assessment Qualifiers: patient intubated  Pupil PERRLA: yes     24hr Temp:  [99.9 °F (37.7 °C)-102.4 °F (39.1 °C)]     CV:   Rhythm: atrial rhythm  BP goals:   SBP < 160  MAP >  60    Resp:   O2 Device (Oxygen Therapy): ventilator  Vent Mode: SIMV  Set Rate: 20 BPM  Oxygen Concentration (%): 40  Vt Set: 460 mL  PEEP/CPAP: 8 cmH20  Pressure Support: 10 cmH20    Plan:  possible comfort measures    GI/:     Diet/Nutrition Received: NPO  Last Bowel Movement: 11/19/22  Voiding Characteristics: incontinence, external catheter    Intake/Output Summary (Last 24 hours) at 11/19/2022 0637  Last data filed at 11/19/2022 0505  Gross per 24 hour   Intake 1210 ml   Output 1150 ml   Net 60 ml     Unmeasured Output  Urine Occurrence: 1  Stool Occurrence: 1  Pad Count: 2    Labs/Accuchecks:  Recent Labs   Lab 11/18/22  0431   WBC 15.65*   RBC 4.09*   HGB 12.5*   HCT 42.4         Recent Labs   Lab 11/18/22  0431 11/18/22  0956 11/19/22  0022   *   < > 155*   K 3.1*   < > 3.7   CO2 35*   < > 31*   *   < > 113*   BUN 76*   < > 96*   CREATININE 1.5*   < > 1.6*   ALKPHOS 83  --   --    ALT 36  --   --    AST 82*  --   --    BILITOT 1.9*  --   --     < > = values in this interval not displayed.      Recent Labs   Lab 11/18/22  0431   INR 1.3*   APTT 31.0      Recent Labs   Lab 11/15/22  0255   *   CPKMB 1.5   TROPONINI 0.205*   MB 0.2       Electrolytes: N/A - electrolytes WDL  Accuchecks: Q4H    Gtts:   dextrose 10 % in water (D10W)      NORepinephrine bitartrate-D5W 0.02 mcg/kg/min (11/18/22 1750)        LDA/Wounds:  Lines/Drains/Airways       Drain  Duration                  NG/OG Tube Center mouth -- days    Male External Urinary Catheter 11/15/22 1100 Small 3 days         Rectal Tube 11/17/22 0445 rectal tube w/ balloon (indicate number of mLs) 2 days              Airway  Duration                  Airway - Non-Surgical -- days              Peripheral Intravenous Line  Duration                  Peripheral IV - Single Lumen 11/17/22 1549 20 G Anterior;Right Forearm 1 day         Peripheral IV - Single Lumen 11/17/22 1550 20 G Anterior;Left Forearm 1 day         Peripheral IV - Single Lumen 11/17/22 1553 18 G Anterior;Right Upper Arm 1 day         Peripheral IV - Single Lumen 11/17/22 1556 20 G Anterior;Left Antecubital 1 day                  Wounds: Yes  Wound care consulted: Yes

## 2022-11-19 NOTE — PROGRESS NOTES
"[facility]  Infectious Disease  Progress Note    Patient Name: Enrico Burnett  MRN: [id number]  Admission Date: 11/14/2022  Length of Stay: 5 days  Attending Physician: Klaus Ferraro DO  Primary Care Provider: Mark Rocha MD    Isolation Status: No active isolations  Assessment/Plan:      Subarachnoid bleed  Management per neurosurgery.    MSSA bacteremia  67 year admitted to outside hospital for CHF exacerbation.  Patient noted to be altered and brain imaging showed intracranial bleed. His hospital course was complicated by MSSA bacteremia. Source unclear. He has some superficial bruising to his skin in the lower extremities.  Also possible the bacteremia is nosocomial.  Unclear how long patient may have been bacteremic.  Blood cultures obtained at [facility] are now negative.    1. Continue oxacillin.    2. SIVA if in line with family goals of care.        Anticipated Disposition: TBD    Thank you for your consult. I will follow-up with patient. Please contact us if you have any additional questions.    Mitul Mtz MD  Infectious Disease  [facility]    Subjective:     Principal Problem:SAH (subarachnoid hemorrhage)    HPI: A 67-year-old man with CAD-MI, HF, HTN, DM2, diverticulosis who was admitted to [facility] after recurrent falls, "worsening respiratory status", and edema. His hospital course was complicated by Af[employer] with rvr, seizure like activity requiring intubation for airway protection, SAH, and MSSA bacteremia (11/11). He was transferred to [facility] for further management of SAH. On transfer he ws started on cefepime and vancomycin.    Infectious Diseases consulted for "Staph bacteremia in critically ill patient"        Interval History:     No adverse events    Review of Systems   Unable to perform ROS: Intubated   Objective:     Vital Signs (Most Recent):  Temp: 99.7 °F (37.6 °C) (11/19/22 1001)  Pulse: 84 (11/19/22 1001)  Resp: (!) 23 " (11/19/22 1001)  BP: (!) 105/59 (11/19/22 1001)  SpO2: 99 % (11/19/22 1001)   Vital Signs (24h Range):  Temp:  [99.7 °F (37.6 °C)-102.4 °F (39.1 °C)] 99.7 °F (37.6 °C)  Pulse:  [] 84  Resp:  [19-42] 23  SpO2:  [96 %-99 %] 99 %  BP: ()/() 105/59     Weight: 100 kg (220 lb 7.4 oz)  Body mass index is 29.9 kg/m².    Estimated Creatinine Clearance: 47.5 mL/min (A) (based on SCr of 1.9 mg/dL (H)).    Physical Exam  Vitals reviewed.   Constitutional:       Appearance: He is well-developed.      Interventions: He is intubated.   HENT:      Head: Normocephalic and atraumatic.      Right Ear: External ear normal.      Left Ear: External ear normal.   Eyes:      Conjunctiva/sclera: Conjunctivae normal.   Neck:      Thyroid: No thyromegaly.   Cardiovascular:      Rate and Rhythm: Normal rate and regular rhythm.      Heart sounds: Normal heart sounds. No murmur heard.  Pulmonary:      Effort: Pulmonary effort is normal. He is intubated.      Breath sounds: Normal breath sounds. No wheezing or rales.   Chest:      Comments: Left sided cardiac device without erythema of the skin.  Abdominal:      General: Bowel sounds are normal.      Palpations: Abdomen is soft. There is no mass.      Tenderness: There is no abdominal tenderness. There is no rebound.   Musculoskeletal:      Cervical back: Normal range of motion and neck supple.   Lymphadenopathy:      Cervical: No cervical adenopathy.   Skin:     General: Skin is warm and dry.      Findings: Bruising present.   Neurological:      Mental Status: He is lethargic.      Comments: Does not respond to verbal or tactile stimuli       Significant Labs:   Microbiology Results (last 7 days)       Procedure Component Value Units Date/Time    Blood culture [[id number]] Collected: 11/15/22 0019    Order Status: Completed Specimen: Blood from Peripheral, Forearm, Left Updated: 11/19/22 0612     Blood Culture, Routine No Growth to date      No Growth to date      No Growth to  date      No Growth to date      No Growth to date    Narrative:      Blood cultures x 2 different sites. 4 bottles total. Please  draw cultures before administering antibiotics.    Blood culture [[id number]] Collected: 11/15/22 0018    Order Status: Completed Specimen: Blood from Peripheral, Lower Leg, Right Updated: 11/19/22 0612     Blood Culture, Routine No Growth to date      No Growth to date      No Growth to date      No Growth to date      No Growth to date    Narrative:      Blood cultures from 2 different sites. 4 bottles total.  Please draw before starting antibiotics.    Blood culture [[id number]] Collected: 11/18/22 1439    Order Status: Completed Specimen: Blood from Peripheral, Upper Arm, Right Updated: 11/19/22 0115     Blood Culture, Routine No Growth to date    Blood culture [[id number]] Collected: 11/18/22 1439    Order Status: Completed Specimen: Blood from Peripheral, Upper Arm, Left Updated: 11/19/22 0115     Blood Culture, Routine No Growth to date    Culture, Respiratory with Gram Stain [[id number]]  (Abnormal) Collected: 11/15/22 0954    Order Status: Completed Specimen: Respiratory from Endotracheal Aspirate Updated: 11/17/22 1356     Respiratory Culture No S aureus or Pseudomonas isolated.      BELA DUBLINIENSIS  Few       Gram Stain (Respiratory) <10 epithelial cells per low power field.     Gram Stain (Respiratory) Rare  WBC's     Gram Stain (Respiratory) Rare yeast    Narrative:      Mini-BAL.            Significant Imaging: I have reviewed all pertinent imaging results/findings within the past 24 hours.

## 2022-11-19 NOTE — PROGRESS NOTES
[facility]  Neurocritical Care  Progress Note    Admit Date: 11/14/2022  Service Date: 11/18/2022  Length of Stay: 4    Subjective:     Chief Complaint: SAH (subarachnoid hemorrhage)    History of Present Illness: 66 yo M w/ hx a fib/a flutter (on Warfarin), CHF transferred for eval and management for SAH. No notes sent with patient, unable to get exact events/timeline. It appears he was being treated for CHF, had unsuccessful cardioversion on 11/8 and also staph bacteremia on 11/11. Over the weekend, had seizure acitivy over the weekend requiring intuabtion. CTH showed diffuse SAH, CTA read as no Aneurysm/avm. INR was supratheraputic a few days ago > 4, but had decreased to 2's last few days. No reports of trauma, possible spontaneuous in setting of supratheraputic INR. PCC and Vit K given for elevated INR. DDAVP given for ASA. Transferred to [facility] for further eval and management.         Hospital Course: 11/15/2022: weaning versed to off, continue formal EEG and Keppra. Echo done, EF 7%. Patient made DNR by family. Added diamox 500. Consulted ID for staph bacteremia, palliative.   11/16/2022: OSH hospital records received via print. Per OSH records, patient inbubated on 11/11, central line placed 11/14. Patient noted to have seizure-like activity at OSH although no EEG records were obtained. New TTE obtained here w/ EF 10-15%; lowered MAP goal to >60. A/C to SIMV; ABG with improving alkalosis; continuing diamox. Continuing abx for bacteremia; BC NGTD.   11/17/2022: Febrile t/o today with increased WBC; abx switch to Oxacillin per ID. CXR with improving pulmonary edema. ABGs consistent with improving alkalosis. Vent SIMV. Duonebs and CPT for mucus production. Cr increasing; will CTM. Keppra 500 BID.   11/18/2022: Persistent fever although WBC downtrending. Started Levo this PM for MAP <60. Lactic and procal pending. EEG without seizure activity. Increased insulin regimen for hyperglycemia.        Interval History:  Refer to hospital course above    Review of Systems   Unable to perform ROS: Intubated     Objective:     Vitals:  Temp: (!) 102.2 °F (39 °C)  Pulse: (!) 113  Rhythm: atrial rhythm  BP: (!) 161/67  MAP (mmHg): 97  Resp: 20  SpO2: 97 %  Oxygen Concentration (%): 40  O2 Device (Oxygen Therapy): ventilator  Vent Mode: SIMV  Set Rate: 20 BPM  Vt Set: 460 mL  Pressure Support: 10 cmH20  PEEP/CPAP: 8 cmH20  Peak Airway Pressure: 35 cmH2O  Mean Airway Pressure: 16 cmH20  Plateau Pressure: 0 cmH20    Temp  Min: 97.9 °F (36.6 °C)  Max: 102.4 °F (39.1 °C)  Pulse  Min: 93  Max: 133  BP  Min: 62/47  Max: 161/67  MAP (mmHg)  Min: 52  Max: 108  Resp  Min: 20  Max: 39  SpO2  Min: 95 %  Max: 100 %  Oxygen Concentration (%)  Min: 40  Max: 40    11/17 0701 - 11/18 0700  In: 2777.6   Out: 2000 [Urine:1550]   Unmeasured Output  Urine Occurrence: 3  Stool Occurrence: 1  Pad Count: 2       Physical Exam  Physical Exam  Constitutional:       Appearance: He is obese. He is ill-appearing.      Comments: Intubated; unresponsive. Not on sedation   HENT:      Head: Normocephalic and atraumatic.      Right Ear: External ear normal.      Ears:      Comments: Necrotic wound present on L ear      Nose: Nose normal.      Mouth/Throat:      Mouth: Mucous membranes are moist.      Comments: ETT in place  Eyes:      Conjunctiva/sclera: Conjunctivae normal.   Cardiovascular:      Rate and Rhythm: Tachycardia. Rhythm irregular.      Heart sounds: Normal heart sounds.   Pulmonary:      Breath sounds: Normal breath sounds.      Comments: Ventilated breath sounds equal bilaterally   Abdominal:      General: Abdomen is flat. There is no distension.      Palpations: Abdomen is soft.   Musculoskeletal:      Cervical back: Neck supple.      Right lower leg: No edema.      Left lower leg: No edema.      Comments: Multiple wounds over bilateral LE; dressings CDI. WC following    Skin:     General: Skin is warm and dry. Wounds and  multiple keratosis throughout bilateral UE and hands.      Capillary Refill: Capillary refill takes less than 2 seconds.   Neurological:      Comments: Eyes open with tactile and verbal stimulation. Persistently difficult to arouse. No response to painful stimulus on UE, no withdrawal; weak withdrawal to painful stimulus on LE.        Medications:  Continuousdextrose 10 % in water (D10W)  NORepinephrine bitartrate-D5W, Last Rate: 0.02 mcg/kg/min (11/18/22 1750)  Scheduledamiodarone in dextrose, ,   amiodarone, 200 mg, Daily  famotidine, 20 mg, BID  heparin (porcine), 5,000 Units, Q8H  insulin aspart U-100, 6 Units, 6 times per day  levetiracetam IV, 500 mg, Q12H  mupirocin, , BID  oxacillin 12 g in  mL CONTINUOUS INFUSION, 12 g, Q24H  PRNacetaminophen, 650 mg, Q6H PRN  albuterol-ipratropium, 3 mL, Q4H PRN  dextrose 10 % in water (D10W), , Continuous PRN  dextrose 10%, 12.5 g, PRN  dextrose 10%, 25 g, PRN  glucagon (human recombinant), 1 mg, PRN  insulin aspart U-100, 1-10 Units, Q4H PRN  labetalol, 10 mg, Q4H PRN  magnesium oxide, 800 mg, PRN  magnesium oxide, 800 mg, PRN  potassium bicarbonate, 35 mEq, PRN  potassium bicarbonate, 50 mEq, PRN  potassium bicarbonate, 60 mEq, PRN  potassium, sodium phosphates, 2 packet, PRN  potassium, sodium phosphates, 2 packet, PRN  potassium, sodium phosphates, 2 packet, PRN  sodium chloride 0.9%, 10 mL, PRN      Diet  NPO      Assessment/Plan:     Neuro  * SAH (subarachnoid hemorrhage)  Diffuse SAH noted on CTH after concern for seizures over the weekend. No known trauma, though INR was supra-theraputic.  PCC/vit K(coumadin) and DDAVP (ASA) given prior to transport    Trending daily coags (PT/INR, aPTT)  CTA head read as no concern for Aneurysm/avm   MRI brain without, MRA brain without showing consistent stable bleed as prior CTH  NSGY s/o at this time; not a surgical candidate d/t neurologic status and instability  [facility] neuro following     Subarachnoid bleed  See  SAH    [place] coma scale total score 3-8  E1 V1 M3  Off sedation with difficulty aroising, although slightly opens eyes with stimulation. Continue AEDS.      Seizure  Arrived on keppra, unable to view/assess home meds or meds prior to arrival. OSH documented seizure like activity but no EEG records obtained.     Off fentanyl and versed with continued difficulty arousing   Keppra 500 bid IV  Formal EEG without seizure activity     Pulmonary  Acute respiratory failure with hypoxia and hypercapnia  Arrived on vent, PAo2 60% with peep 5. Bilateral effusion on CT chest  Assess and wean fiO2 as able  Alkalosis improving on ABGs  Vent SIMV; following ABGs as indicated         Cardiac/Vascular  Congestive heart failure  Patient is identified as having Systolic (HFrEF) heart failure that is Chronic. CHF is currently uncontrolled due to Continued edema of extremities, Dyspnea not returned to baseline after multiple doses of IV diuretic and Pulmonary edema/pleural effusion on CXR. Latest ECHO performed and demonstrates- Results for orders placed during the hospital encounter of 11/14/22    Echo    Interpretation Summary  · The left ventricle is normal in size with severely decreased systolic function.  · The estimated ejection fraction is 10-15%.  · There is left ventricular global hypokinesis. No thrombus seen.  · Left ventricular diastolic dysfunction.  · Normal right ventricular size with mildly to moderately reduced right ventricular systolic function.  · Severe left atrial enlargement.  · Mechanically ventilated; cannot use inferior caval vein diameter to estimate central venous pressure.  . Continue acetazolamide (restart GDMT when stable) and monitor clinical status closely. Monitor on telemetry. Patient is off CHF pathway.  Monitor strict Is&Os and daily weights.  Place on fluid restriction of tube feedings only. Continue to stress to patient importance of self efficacy and  on diet for CHF. Last BNP reviewed-  and noted below   Recent Labs   Lab 11/15/22  0016   *   .      Elevated troponin  Elevated prior to arrival, will trend     Atrial fibrillation  Rate stable; although tachycardic this PM s/p amio 150mg bolus IV  Continue amio daily   Began SQH DVT ppx     Hypotension  Unsure of cause on admissed, it was mentioned when calling for transfer that patient may be bacteremic/sepsie. Aggressive diuresis over past few weeks/days, hypovolemia. AEDs versed gtt, and possible propofol prior to transfer. Required Levo for SBP > 100 and MAP > 65    -goal MAP>60 in setting of HFrEF EF 10-15%  -levo gtt        Combined systolic and diastolic congestive heart failure  Unable to view any documentation from OSH, it was mentioned that EF was 20-25%, and diastolic function was also decreased. [place] [facility] [place] EF 10-15%.     Now requiring levo gtt for MAP>60  EKG w/ atrial flutter and AV block; follow-up EKG with AF (without RVR)  -It did appear patient may have been on Entresto at some point, so possible acute on chronic failure    -improving pulmonary edema on CXR 11/17        Renal/  Metabolic alkalosis  Bicarb > 40, concern for contraction alkalosis in setting of aggressive diuressis; improved with diamox  Continuing to follow CMP and ABGs as indicated; improving on ABG  Lasix held; pulm edema improved on CXR 11/17  Alkalosis improved on subsequent ABGs      ROSEMARIE (acute kidney injury)  ROSEMARIE vs ROSEMARIE on CKD  follow I/O, crt/bun      ID  MSSA bacteremia  Blood Culture NGTD  Febrile t/o today but WBC is now downtrending  Zosyn, Vanc switched to Oxacillin per ID       Palliative Care  Advance care planning  See palliative care encounter     Palliative care encounter  Palliative following.     -family visit today; follow-up GOC conversation           The patient is being Prophylaxed for:  Venous Thromboembolism with: Chemical  Stress Ulcer with: H2B  Ventilator Pneumonia with: chlorhexidine oral care    Activity Orders           Turn patient starting at 11/15 0000    Elevate HOB starting at 11/14 2326    Diet NPO: NPO starting at 11/14 2326        Partial Code    Abby Leyva MD  Neurocritical Care  [facility] - [facility] Critical [facility]

## 2022-11-19 NOTE — PROGRESS NOTES
[facility] - Neuro Critical Care  Neurocritical Care  Progress Note    Admit Date: 11/14/2022  Service Date: 11/19/2022  Length of Stay: 5    Subjective:     Chief Complaint: SAH (subarachnoid hemorrhage)    History of Present Illness: 68 yo M w/ hx a fib/a flutter (on Warfarin), CHF transferred for eval and management for SAH. No notes sent with patient, unable to get exact events/timeline. It appears he was being treated for CHF, had unsuccessful cardioversion on 11/8 and also staph bacteremia on 11/11. Over the weekend, had seizure acitivy over the weekend requiring intuabtion. CTH showed diffuse SAH, CTA read as no Aneurysm/avm. INR was supratheraputic a few days ago > 4, but had decreased to 2's last few days. No reports of trauma, possible spontaneuous in setting of supratheraputic INR. PCC and Vit K given for elevated INR. DDAVP given for ASA. [place] [facility] [place].         Hospital Course: 11/15/2022: weaning versed to off, continue formal EEG and Keppra. Echo done, EF 7%. Patient made DNR by family. Added diamox 500. Consulted ID for staph bacteremia, palliative.   11/16/2022: OSH hospital records received via print. Per OSH records, patient inbubated on 11/11, central line placed 11/14. Patient noted to have seizure-like activity at OSH although no EEG records were obtained. New TTE obtained here w/ EF 10-15%; lowered MAP goal to >60. A/C to SIMV; ABG with improving alkalosis; continuing diamox. Continuing abx for bacteremia; BC NGTD.   11/17/2022: Febrile t/o today with increased WBC; abx switch to Oxacillin per ID. CXR with improving pulmonary edema. ABGs consistent with improving alkalosis. Vent SIMV. Duonebs and CPT for mucus production. Cr increasing; will CTM. Keppra 500 BID.   11/18/2022: Persistent fever although WBC downtrending. Started Levo this PM for MAP <60. Lactic and procal pending. EEG without seizure activity. Increased insulin regimen for hyperglycemia.    11/19/2022: 250cc FW boluses and 1x Lasix 80 IV for hypernatremia. Continuing with electrolyte replacement. Patient with diaphragmatic spasms, abdominal distension and slight constipation - f/u CXR and KUB. Will continue GOC discussion with family today.       Interval History:  Refer to hospital course above    Review of Systems   Unable to perform ROS: Intubated     Objective:     Vitals:  Temp: 99.5 °F (37.5 °C)  Pulse: 91  Rhythm: atrial rhythm  BP: 105/60  MAP (mmHg): 76  Resp: (!) 24  SpO2: 99 %  Oxygen Concentration (%): 40  O2 Device (Oxygen Therapy): ventilator  Vent Mode: SIMV  Set Rate: 20 BPM  Vt Set: 460 mL  Pressure Support: 10 cmH20  PEEP/CPAP: 8 cmH20  Peak Airway Pressure: 36 cmH2O  Mean Airway Pressure: 16 cmH20  Plateau Pressure: 0 cmH20    Temp  Min: 99.1 °F (37.3 °C)  Max: 102.4 °F (39.1 °C)  Pulse  Min: 78  Max: 124  BP  Min: 62/47  Max: 200/110  MAP (mmHg)  Min: 52  Max: 111  Resp  Min: 19  Max: 53  SpO2  Min: 95 %  Max: 100 %  Oxygen Concentration (%)  Min: 40  Max: 40    11/18 0701 - 11/19 0700  In: 1210   Out: 1400 [Urine:1150]   Unmeasured Output  Urine Occurrence: 1  Stool Occurrence: 1  Pad Count: 2         Physical Exam  Constitutional:       Appearance: He is obese. He is ill-appearing.      Comments: Intubated; opening eyes to verbal stimulation. Not on sedation   HENT:      Head: Normocephalic and atraumatic.      Right Ear: External ear normal.      Ears:      Comments: Necrotic wound present on L ear      Nose: Nose normal.      Mouth/Throat:      Mouth: Mucous membranes are moist.      Comments: ETT in place  Eyes:      Conjunctiva/sclera: Conjunctivae normal.   Cardiovascular:      Rate and Rhythm: Tachycardia. Rhythm irregular.      Heart sounds: Normal heart sounds.   Pulmonary:      Breath sounds: Normal breath sounds.      Comments: Ventilated breath sounds equal bilaterally   Abdominal:      General: Abdomen is flat. There is no distension.      Palpations: Abdomen is  soft.   Musculoskeletal:      Cervical back: Neck supple.      Right lower leg: No edema.      Left lower leg: No edema.      Comments: Multiple wounds over bilateral LE; dressings CDI. WC following    Skin:     General: Skin is warm and dry. Wounds, skin tears and multiple keratosis throughout bilateral UE and LE.      Capillary Refill: Capillary refill takes less than 2 seconds.   Neurological:      Comments: Eyes open with tactile and verbal stimulation. Although remains difficult to arouse. Withdrawal to painful stimulus on LUE; involuntary twitching movements but no withdrawal on the RUE.  Weak withdrawal to painful stimulus on LE.        Medications:  Continuousdextrose 10 % in water (D10W)  NORepinephrine bitartrate-D5W, Last Rate: 0.02 mcg/kg/min (11/19/22 1105)  Scheduledamiodarone, 200 mg, Daily  famotidine, 20 mg, BID  furosemide (LASIX) injection, 80 mg, Once  heparin (porcine), 5,000 Units, Q8H  insulin aspart U-100, 6 Units, 6 times per day  levetiracetam IV, 500 mg, Q12H  mupirocin, , BID  oxacillin 12 g in  mL CONTINUOUS INFUSION, 12 g, Q24H  PRNacetaminophen, 650 mg, Q6H PRN  albuterol-ipratropium, 3 mL, Q4H PRN  dextrose 10 % in water (D10W), , Continuous PRN  dextrose 10%, 12.5 g, PRN  dextrose 10%, 25 g, PRN  glucagon (human recombinant), 1 mg, PRN  insulin aspart U-100, 1-10 Units, Q4H PRN  labetalol, 10 mg, Q4H PRN  sodium chloride 0.9%, 10 mL, PRN      Diet  NPO      Assessment/Plan:     Neuro  * SAH (subarachnoid hemorrhage)  Diffuse SAH noted on CTH after concern for seizures over the weekend. No known trauma, though INR was supra-theraputic.  PCC/vit K(coumadin) and DDAVP (ASA) given prior to transport    Trending daily coags (PT/INR, aPTT)  CTA head read as no concern for Aneurysm/avm   MRI brain without, MRA brain without showing consistent stable bleed as prior CTH  NSGY s/o at this time; not a surgical candidate d/t neurologic status and instability  vasc neuro following      Subarachnoid bleed  See SAH    Byron coma scale total score 3-8  E1 V1 M3  Off sedation with difficulty aroising, although slightly opens eyes with verbal stimulation. Continue AEDS.      Seizure  Arrived on keppra, unable to view/assess home meds or meds prior to arrival. OSH documented seizure like activity but no EEG records obtained.     Off fentanyl and versed with continued difficulty arousing   Keppra 500 bid IV  Formal EEG without seizure activity     Pulmonary  Acute respiratory failure with hypoxia and hypercapnia  Arrived on vent, PAo2 60% with peep 5. Bilateral effusion on CT chest  Assess and wean fiO2 as able  Alkalosis improving on ABGs  Vent SIMV; following ABGs as indicated         Cardiac/Vascular  Congestive heart failure  Patient is identified as having Systolic (HFrEF) heart failure that is Chronic. CHF is currently uncontrolled due to Continued edema of extremities, Dyspnea not returned to baseline after multiple doses of IV diuretic and Pulmonary edema/pleural effusion on CXR. Latest ECHO performed and demonstrates- Results for orders placed during the hospital encounter of 11/14/22    Echo    Interpretation Summary  · The left ventricle is normal in size with severely decreased systolic function.  · The estimated ejection fraction is 10-15%.  · There is left ventricular global hypokinesis. No thrombus seen.  · Left ventricular diastolic dysfunction.  · Normal right ventricular size with mildly to moderately reduced right ventricular systolic function.  · Severe left atrial enlargement.  · Mechanically ventilated; cannot use inferior caval vein diameter to estimate central venous pressure.  . Continue acetazolamide (restart GDMT when stable) and monitor clinical status closely. Monitor on telemetry. Patient is off CHF pathway.  Monitor strict Is&Os and daily weights.  Place on fluid restriction of tube feedings only. Continue to stress to patient importance of self efficacy and   on diet for CHF. Last BNP reviewed- and noted below   Recent Labs   Lab 11/15/22  0016   *   .      Elevated troponin  Elevated prior to arrival, will trend     Atrial fibrillation  Rate stable t/o hospital course; episode of tachycardia 11/18 s/p amio 150mg bolus IV  Continue amio daily   Began SQH DVT ppx     Hypotension  Unsure of cause on admissed, it was mentioned when calling for transfer that patient may be bacteremic/sepsie. Aggressive diuresis over past few weeks/days, hypovolemia. AEDs versed gtt, and possible propofol prior to transfer. Required Levo for SBP > 100 and MAP > 65    -goal MAP>60 in setting of HFrEF EF 10-15%  -levo gtt        Combined systolic and diastolic congestive heart failure  Unable to view any documentation from OSH, it was mentioned that EF was 20-25%, and diastolic function was also decreased. Repeat TTE at [facility] [place] with EF 10-15%.     Now requiring levo gtt for MAP>60  EKG w/ atrial flutter and AV block; follow-up EKG with AF (without RVR)  -It did appear patient may have been on Entresto at some point, so possible acute on chronic failure    -improving pulmonary edema on CXR 11/17  -Increasing FW boluses and 1x dose Lasix 80 for hypernatremia        Renal/  Metabolic alkalosis  Bicarb > 40, concern for contraction alkalosis in setting of aggressive diuressis; improved with diamox  Continuing to follow CMP and ABGs as indicated; improving on ABG  Lasix 1x IV 80 11/19  Alkalosis improved on subsequent ABGs      ROSEMARIE (acute kidney injury)  ROSEMARIE vs ROSEMARIE on CKD  follow I/O, crt/bun      ID  MSSA bacteremia  Blood Culture NGTD  Febrile t/o today; WBC persistently high (stable)  Zosyn, Vanc switched to Oxacillin per ID       Palliative Care  Advance care planning  See palliative care encounter     Palliative care encounter  Palliative following.     -family visit today; follow-up GOC conversation           The patient is being Prophylaxed for:  Venous Thromboembolism with:  Chemical  Stress Ulcer with: H2B  Ventilator Pneumonia with: chlorhexidine oral care    Activity Orders          Turn patient starting at 11/15 0000    Elevate HOB starting at 11/14 2326    Diet NPO: NPO starting at 11/14 2326        Partial Code    Abby Leyva MD  Neurocritical Care  [facility] - Neuro Critical Care

## 2022-11-19 NOTE — ASSESSMENT & PLAN NOTE
Bicarb > 40, concern for contraction alkalosis in setting of aggressive diuressis; improved with diamox  Continuing to follow CMP and ABGs as indicated; improving on ABG  Lasix 1x IV 80 11/19  Alkalosis improved on subsequent ABGs

## 2022-11-19 NOTE — ASSESSMENT & PLAN NOTE
Unsure of cause on admissed, it was mentioned when calling for transfer that patient may be bacteremic/sepsie. Aggressive diuresis over past few weeks/days, hypovolemia. AEDs versed gtt, and possible propofol prior to transfer. Required Levo for SBP > 100 and MAP > 65    -goal MAP>60 in setting of HFrEF EF 10-15%  -levo gtt

## 2022-11-19 NOTE — ACP (ADVANCE CARE PLANNING)
ACP Goals of Care and patient family update:  I engaged the family and healthcare power of   in a conversation about advance care planning and we specifically addressed what the goals of care would be moving forward, in light of the patient's ongoing challenging clinical status in Neuro ICU with poor mentation secondary to multiple subarachnoid hemorrhages, continued need for mechanical ventilation, intermittent vasopressors, severe heart failure, ongoing intermittent fevers related to infection, and possible prior seizures.    We did specifically address the patient's likely prognosis, which is  very guarded to likely poor .  I explained that from a purely neurologic standpoint his MRI did not demonstrate any substantial new injuries beyond the scattered subarachnoid hemorrhages that were already  known - however his examination off sedation still remains very poor without signs of clear consciousness at this time. We explored the patient's values and preferences for future care.  The family and healthcare power of   endorses that what is most important right now is to focus on improvement in condition but with limits to invasive therapies and comfort and QOL .  They expressed to me that patient had previously noted he would not be happy to be resigned to medical institutionalization including nursing home for any prolonged periods of time.  They expressed hope for improvement but understand that even in the most optimistic of scenarios, Mr Burnett is looking at a prolonged period of complete medical dependence, in hopes for an uncertain recovery over time.  They do not feel he would acquiesce to tracheostomy + PEG tube. Patient's wife relayed to me her and patient's strong venancio and belief that they will return to God when they die and that this comes for everyone eventually. They do however, at my advise, wish to spend time speaking amongst themselves and sleeping on decisions.    Accordingly, we  have decided that the best plan to meet the patient's goals includes continuing with treatment tonight with limitations of care aka DNR status as presently included, with plans to re-convene within 24-72 hours for more finalized decisions.    I did explain the role for hospice care at this stage of the patient's illness, including its ability to help the patient live with the best quality of remaining life possible.  I explained that in patient's as sick as Mr Burnett, this process if often performed entirely within the setting of the ICU by our team, often with Palliative care assistance. We will consider referral on basis of family decision making.    I spent a total of 45 minutes engaging the patient in this advance care planning discussion.    Klaus Ferraro, DO  Neuro Critical Care

## 2022-11-20 PROBLEM — R50.9 FEVER IN ADULT: Status: ACTIVE | Noted: 2022-01-01

## 2022-11-20 NOTE — PROCEDURES
[facility] EEG/VIDEO MONITORING REPORT  Enrico Burnett  [id number]  1955    DATE OF SERVICE  DATE OF ADMISSION: 11/14/2022  9:47 PM    ADMITTING/REQUESTING PROVIDER: Es Bhakta MD    REASON FOR CONSULT: 70 year old male admitted for subarachnoid hemorrhage with seizure like activity.     METHODOLOGY   Electroencephalographic (EEG) recording is with electrodes placed according to the International 10-20 placement system.  Thirty two (32) channels of digital signal (sampling rate of 512/sec) including T1 and T2 was simultaneously recorded from the scalp and may include  EKG, EMG, and/or eye monitors.  Recording band pass was 0.1 to 512 hz.  Digital video recording of the patient is simultaneously recorded with the EEG.  The patient is instructed report clinical symptoms which may occur during the recording session.  EEG and video recording is stored and archived in digital format.  Activation procedures which include photic stimulation, hyperventilation and instructing patients to perform simple task are done in selected patients.   The EEG is displayed on a monitor screen and can be reviewed using different montages.  Computer assisted analysis is employed to detect spike and electrographic seizure activity.   The entire record is submitted for computer analysis.  The entire recording is visually reviewed and the times identified by computer analysis as being spikes or seizures are reviewed again.  Compresses spectral analysis (CSA) is also performed on the activity recorded from each individual channel.  This is displayed as a power display of frequencies from 0 to 30 Hz over time.   The CSA is reviewed looking for asymmetries in power between homologous areas of the scalp and then compared with the original EEG recording.     VeedMe software is also utilized in the review of this study.  This software suite analyzes the EEG recording in multiple domains.  Coherence and rhythmicity is computed to identify EEG  sections which may contain organized seizures.  Each channel undergoes analysis to detect presence of spike and sharp waves which have special and morphological characteristic of epileptic activity.  The routine EEG recording is converted from spacial into frequency domain.  This is then displayed comparing homologous areas to identify areas of significant asymmetry.  Algorithm to identify non-cortically generated artifact is used to separate eye movement, EMG and other artifact from the EEG.      RECORDING TIMES  Start on 11/20/2022 at 06:26:55  Stop on 11/20/2022 at 08:50:26  A total of 2 hours and 23 minutes of EEG recording is obtained.    EEG FINDINGS  Background activity:   Within the limitation of a significant amount of movement/lead artifact on an electrode cap EEG, the background is composed primarily of generalized delta activity with interspersed theta activity (reaching a maximum of 6 hz).  The background appears continuous and symmetric.          Sleep:  There is no clear sleep architecture during this study.     Activation procedures:   Hyperventilation is not performed  Photic stimulation is not performed    Cardiac Monitor:   Electrode caps do not have EKG capabilities    Other findings:  There is ventilator artifact present every time patient takes a breath during the study.      Impression:   Within the limitation of a significant amount of lead/movement artifact, this is an abnormal electrode cap EEG monitoring study due to the presence of generalized delta slowing indicative of a severe diffuse encephalopathy.  There are no pushbutton activations.  There are no apparent epileptiform discharges or electrographic seizures.  Depending on the clinical scenario, consider additional monitoring with standard scalp electrodes.    Faith Turner MD  [facility]   Department of Neurology

## 2022-11-20 NOTE — SIGNIFICANT EVENT
Called to bedside by RN for new onset right upper extremity rhythmic jerking lasting approximately 20-30 seconds, resolved temporarily, then involved both upper extremities.     This pattern recurred, episodes occurring every 2-5 minutes, also involving facial twitching and lip smacking. HR elevated into 150s.    2 mg ativan x 2 given (no improvement in duration or frequency of episodes after initial dose) and EEG order placed. Maintenance Keppra dose increased.      Between episodes, patient's peak pressures in 50s and very little air movement on ventilator (TV <100). Coarse breath sounds with poor movement, no wheezing on auscultation. Aggressive lavage and CPT done, yielding copious thick secretions. Peak pressures normalized.     Critical Care time: 35 minutes     Aura Edgar PA-C  Neuro Critical Care  [id number]

## 2022-11-20 NOTE — PLAN OF CARE
[facility] Care Plan    POC reviewed with Enrico Burnett and family at 1400. Pt verbalized understanding. Questions and concerns addressed. No acute events today. Pt progressing toward goals. Will continue to monitor. See below and flowsheets for full assessment and VS info.     Febrile today, residuals up. Cooling blanket reapplied, IV tylenol given for fever. Enteral feed decreased to 30 mL/hr for 24 hours. 0.45% NS @ 50 mL/hr added for elevated Na++. Amio bolus followed by continuous gtt for increased HR. Continuous EEG also applied. Team contacted family today to try to set up a convenient meeting to discuss plan of care.         Is this a stroke patient? yes- Stroke booklet reviewed with patient, risk factors identified for patient and stroke booklet remains at bedside for ongoing education.     Neuro:  Byron Coma Scale  Best Eye Response: 1-->(E1) none  Best Motor Response: 4-->(M4) withdraws from pain  Best Verbal Response: 1-->(V1) none  [place] Coma Scale Score: 6  Assessment Qualifiers: patient intubated  Pupil PERRLA: yes     24 hr Temp:  [97.5 °F (36.4 °C)-101.7 °F (38.7 °C)]     CV:   Rhythm: paced rhythm, atrial rhythm  BP goals:   SBP < 160  MAP > 60    Resp:   O2 Device (Oxygen Therapy): ventilator  Vent Mode: A/C  Set Rate: 20 BPM  Oxygen Concentration (%): 40  Vt Set: 460 mL  PEEP/CPAP: 8 cmH20  Pressure Support: 10 cmH20    Plan: N/A    GI/:     Diet/Nutrition Received: tube feeding  Last Bowel Movement: 11/19/22  Voiding Characteristics: urethral catheter (bladder)    Intake/Output Summary (Last 24 hours) at 11/20/2022 1442  Last data filed at 11/20/2022 1101  Gross per 24 hour   Intake 2110 ml   Output 2425 ml   Net -315 ml     Unmeasured Output  Urine Occurrence: 1  Stool Occurrence: 1  Pad Count: 2    Labs/Accuchecks:  Recent Labs   Lab 11/20/22 0427   WBC 13.62*   RBC 4.27*   HGB 13.2*   HCT 44.6         Recent Labs   Lab 11/20/22  0427 11/20/22  0932   * 161*   K 3.8 3.8   CO2  32* 33*   * 113*   * 103*   CREATININE 1.8* 1.9*   ALKPHOS 117  --    ALT 69*  --    *  --    BILITOT 1.5*  --       Recent Labs   Lab 11/20/22  0427   INR 1.4*   APTT 26.9      Recent Labs   Lab 11/15/22  0255   *   CPKMB 1.5   TROPONINI 0.205*   MB 0.2       Electrolytes: Electrolytes replaced  Accuchecks: Q4H    Gtts:   sodium chloride 0.45% 50 mL/hr at 11/20/22 1222    amiodarone in dextrose 5% 1 mg/min (11/20/22 1237)    amiodarone in dextrose 5%      dextrose 10 % in water (D10W)      NORepinephrine bitartrate-D5W 0.02 mcg/kg/min (11/20/22 1342)       LDA/Wounds:  Lines/Drains/Airways       Drain  Duration                  NG/OG Tube Center mouth -- days    Male External Urinary Catheter 11/15/22 1100 Small 5 days         Rectal Tube 11/17/22 0445 rectal tube w/ balloon (indicate number of mLs) 3 days              Airway  Duration                  Airway - Non-Surgical -- days              Peripheral Intravenous Line  Duration                  Peripheral IV - Single Lumen 11/17/22 1549 20 G Anterior;Right Forearm 2 days         Peripheral IV - Single Lumen 11/17/22 1550 20 G Anterior;Left Forearm 2 days         Peripheral IV - Single Lumen 11/17/22 1553 18 G Anterior;Right Upper Arm 2 days         Peripheral IV - Single Lumen 11/17/22 1556 20 G Anterior;Left Antecubital 2 days                  Wounds: Yes  Wound care consulted: Yes

## 2022-11-20 NOTE — PROGRESS NOTES
Enrico Burnett is a 67 y.o. male with PMHx of CAD, HF, afib (warfarin), CKD, DM, HTN, hypothyroidism, and seizures who presented as transfer from [facility] for SAH. Patient initially admitted for HF exacerbation and frequent falls. Patient has seizures at OSH, CT with SAH. INR >4 a few days prior to transfer. INR was later lower around 2, he received PCC and Vitamin K. DDAVP was given for aspirin.     Pattern of SAH not consistent with aneurysmal SAH. Suspect SAH likely related to bleeding diathesis in setting of supra-therapeutic INR.    Patient with EF of 10-15% and staph bacteremia, on oxacillin. Remains intubated and on pressors. Exam remains poor while off sedation. Neuro ICU continuing goals of care discussion with patient's family.    There are no new recommendations at this time. Stroke team will sign off. Discussed patient with staff. Please contact stroke team for any questions or concerns.         Aura Quintero PA-C  Vascular Neurology  [phone]

## 2022-11-21 PROBLEM — Z51.5 COMFORT MEASURES ONLY STATUS: Status: ACTIVE | Noted: 2022-01-01

## 2022-11-21 NOTE — ASSESSMENT & PLAN NOTE
May be related to his underlying subarachnoid hemorrhage.  Blood cultures are now negative so unlikely related to bloodstream infection.

## 2022-11-21 NOTE — PROGRESS NOTES
[facility] - Neuro Critical Care  Neurocritical Care  Progress Note    Admit Date: 11/14/2022  Service Date: 11/20/2022  Length of Stay: 6    Subjective:     Chief Complaint: SAH (subarachnoid hemorrhage)    History of Present Illness: 66 yo M w/ hx a fib/a flutter (on Warfarin), CHF transferred for eval and management for SAH. No notes sent with patient, unable to get exact events/timeline. It appears he was being treated for CHF, had unsuccessful cardioversion on 11/8 and also staph bacteremia on 11/11. Over the weekend, had seizure acitivy over the weekend requiring intuabtion. CTH showed diffuse SAH, CTA read as no Aneurysm/avm. INR was supratheraputic a few days ago > 4, but had decreased to 2's last few days. No reports of trauma, possible spontaneuous in setting of supratheraputic INR. PCC and Vit K given for elevated INR. DDAVP given for ASA. Transferred to [facility] [place] for further eval and management.         Hospital Course: 11/15/2022: weaning versed to off, continue formal EEG and Keppra. Echo done, EF 7%. Patient made DNR by family. Added diamox 500. Consulted ID for staph bacteremia, palliative.   11/16/2022: OSH hospital records received via print. Per OSH records, patient inbubated on 11/11, central line placed 11/14. Patient noted to have seizure-like activity at OSH although no EEG records were obtained. New TTE obtained here w/ EF 10-15%; lowered MAP goal to >60. A/C to SIMV; ABG with improving alkalosis; continuing diamox. Continuing abx for bacteremia; BC NGTD.   11/17/2022: Febrile t/o today with increased WBC; abx switch to Oxacillin per ID. CXR with improving pulmonary edema. ABGs consistent with improving alkalosis. Vent SIMV. Duonebs and CPT for mucus production. Cr increasing; will CTM. Keppra 500 BID.   11/18/2022: Persistent fever although WBC downtrending. Started Levo this PM for MAP <60. Lactic and procal pending. EEG without seizure activity. Increased insulin regimen for hyperglycemia.    11/19/2022: 250cc FW boluses and 1x Lasix 80 IV for hypernatremia. Continuing with electrolyte replacement. Patient with diaphragmatic spasms, abdominal distension and slight constipation - f/u CXR and KUB. Will continue [facility] discussion with family today.   11/20/2022: Events of twitching concerning for seizure overnight, anti-seizure meds increased and cap EEG without further events, none this morning.  Also had brief episode of mucous plugging requiring lavage. Febrile again overnight, continuing antibiotics as ordered, not obtaining SIVA unless family decides to pursue full care given high risk of procedure in patient's highly unstable state. Hypernatremia remains significant, .45 NS x6 hours administered, will likely need re-diuresis to maintain euvolemia with heart failure.  Intermittent Afib RVR with decreased BP, restarted on amiodarone infusion as gut not appearing to absord many medications, found in residuals by RN on OG tube.  Awaiting family decision making regarding goals of care.        Objective:     Vitals:  Temp: 98.6 °F (37 °C)  Pulse: 98  Rhythm: paced rhythm, atrial rhythm  BP: 103/68  MAP (mmHg): 80  Resp: (!) 23  SpO2: 99 %  Oxygen Concentration (%): 40  O2 Device (Oxygen Therapy): ventilator  Vent Mode: A/C  Set Rate: 20 BPM  Vt Set: 460 mL  PEEP/CPAP: 8 cmH20  Peak Airway Pressure: 25 cmH2O  Mean Airway Pressure: 12 cmH20  Plateau Pressure: 0 cmH20    Temp  Min: 97.5 °F (36.4 °C)  Max: 101.7 °F (38.7 °C)  Pulse  Min: 98  Max: 156  BP  Min: 74/52  Max: 125/61  MAP (mmHg)  Min: 56  Max: 95  Resp  Min: 20  Max: 90  SpO2  Min: 90 %  Max: 99 %  Oxygen Concentration (%)  Min: 40  Max: 40    11/19 0701 - 11/20 0700  In: 1810   Out: 2995 [Urine:2895; Drains:100]   Unmeasured Output  Urine Occurrence: 1  Stool Occurrence: 1  Pad Count: 2       Physical Exam  Constitutional:       Appearance: He is obese. He is ill-appearing.      Comments: Intubated; opening eyes to verbal stimulation. Not on sedation    HENT:      Head: Normocephalic and atraumatic.      Right Ear: External ear normal.      Ears:      Comments: Necrotic wound present on L ear      Nose: Nose normal.      Mouth/Throat:      Mouth: Mucous membranes are moist.      Comments: ETT in place  Eyes:      Conjunctiva/sclera: Conjunctivae normal.   Cardiovascular:      Rate and Rhythm: Tachycardia. Rhythm irregular.      Heart sounds: Normal heart sounds.   Pulmonary:      Breath sounds: Normal breath sounds.      Comments: Ventilated breath sounds equal bilaterally   Abdominal:      General: Abdomen is flat. There is no distension.      Palpations: Abdomen is soft.   Musculoskeletal:      Cervical back: Neck supple.      Right lower leg: No edema.      Left lower leg: No edema.      Comments: Multiple wounds over bilateral LE; dressings CDI. WC following    Skin:     General: Skin is warm and dry. Wounds, skin tears and multiple keratosis throughout bilateral UE and LE.      Capillary Refill: Capillary refill takes less than 2 seconds.   Neurological:      Comments: Eyes open with tactile and verbal stimulation. Although remains difficult to arouse. Withdrawal to painful stimulus on LUE; involuntary twitching movements but no withdrawal on the RUE.  Weak withdrawal to painful stimulus on LE.     Medications:  Continuousamiodarone in dextrose 5%, Last Rate: 0.5 mg/min (11/20/22 1905)  dextrose 10 % in water (D10W)  NORepinephrine bitartrate-D5W, Last Rate: Stopped (11/20/22 1342)    Scheduledfamotidine (PF), 20 mg, Daily  furosemide (LASIX) injection, 80 mg, Once  heparin (porcine), 5,000 Units, Q8H  insulin aspart U-100, 8 Units, 6 times per day  levetiracetam IV, 1,000 mg, Q12H  oxacillin 12 g in  mL CONTINUOUS INFUSION, 12 g, Q24H    PRNacetaminophen, 650 mg, Q6H PRN  albuterol-ipratropium, 3 mL, Q4H PRN  dextrose 10 % in water (D10W), , Continuous PRN  dextrose 10%, 12.5 g, PRN  dextrose 10%, 25 g, PRN  glucagon (human recombinant), 1 mg,  PRN  insulin aspart U-100, 1-10 Units, Q4H PRN  labetalol, 10 mg, Q4H PRN  sodium chloride 0.9%, 10 mL, PRN      Today I personally reviewed pertinent medications, lines/drains/airways, imaging, cardiology results, laboratory results, microbiology results,    Diet  Diet NPO  Diet NPO    TF lowered rate today      Assessment/Plan:     Neuro  * SAH (subarachnoid hemorrhage)  Diffuse SAH noted on CTH after concern for seizures over the weekend. No known trauma, though INR was supra-theraputic.  PCC/vit K(coumadin) and DDAVP (ASA) given prior to transport    Trending daily coags (PT/INR, aPTT)  CTA head read as no concern for Aneurysm/avm   MRI brain without, MRA brain without showing consistent stable bleed as prior CTH  NSGY s/o at this time; not a surgical candidate d/t neurologic status and instability  vasc neuro following     Subarachnoid bleed  See SAH    Byron coma scale total score 3-8  E1 V1 M3  Off sedation with difficulty arousing, although opens eyes with verbal stimulation.  No command following. Continue AEDS.      Seizure  Arrived on keppra, unable to view/assess home meds or meds prior to arrival. OSH documented seizure like activity but no EEG records obtained.     Off fentanyl and versed with continued difficulty arousing   Keppra 500 bid IV  Formal EEG without seizure activity     11/20: another event overnight, loaded with extra keppra with resolution, CAP EEG negative    Pulmonary  Acute respiratory failure with hypoxia and hypercapnia  Arrived on vent, PAo2 60% with peep 5. Bilateral effusion on CT chest  Assess and wean fiO2 as able  Alkalosis improving on ABGs  Vent SIMV; following ABGs as indicated         Cardiac/Vascular  Congestive heart failure  Patient is identified as having Systolic (HFrEF) heart failure that is Chronic. CHF is currently uncontrolled due to Continued edema of extremities, Dyspnea not returned to baseline after multiple doses of IV diuretic and Pulmonary edema/pleural  effusion on CXR. Latest ECHO performed and demonstrates- Results for orders placed during the hospital encounter of 11/14/22    Echo    Interpretation Summary  · The left ventricle is normal in size with severely decreased systolic function.  · The estimated ejection fraction is 10-15%.  · There is left ventricular global hypokinesis. No thrombus seen.  · Left ventricular diastolic dysfunction.  · Normal right ventricular size with mildly to moderately reduced right ventricular systolic function.  · Severe left atrial enlargement.  · Mechanically ventilated; cannot use inferior caval vein diameter to estimate central venous pressure.  . Continue acetazolamide (restart GDMT when stable) and monitor clinical status closely. Monitor on telemetry. Patient is off CHF pathway.  Monitor strict Is&Os and daily weights.  Place on fluid restriction of tube feedings only. Continue to stress to patient importance of self efficacy and  on diet for CHF. Last BNP reviewed- and noted below   Recent Labs   Lab 11/15/22  0016   *   .      Elevated troponin  Elevated prior to arrival    Atrial fibrillation  Rate stable t/o hospital course; episode of tachycardia 11/18 s/p amio 150mg bolus IV  Began SQH DVT ppx     11/20 intermittent spells of RVR which drop pressure substantially, restarted on amio gtt    Hypotension  Unsure of cause on admissed, it was mentioned when calling for transfer that patient may be bacteremic/sepsie. Aggressive diuresis over past few weeks/days, hypovolemia. AEDs versed gtt, and possible propofol prior to transfer. Required Levo for SBP > 100 and MAP > 65    -goal MAP>60 in setting of HFrEF EF 10-15%  -levo gtt    11/20  Continuing intermittent hypotension in setting of RVR, amiodarone gtt started with improvement        Combined systolic and diastolic congestive heart failure  Unable to view any documentation from OSH, it was mentioned that EF was 20-25%, and diastolic function was also  decreased. [place] [facility] [place]     Now requiring levo gtt for MAP>60 wean as able  EKG w/ atrial flutter and AV block; follow-up EKG with AF (without RVR)  -It did appear patient may have been on Entresto at some point, so possible acute on chronic failure    -improving pulmonary edema on CXR 11/17  -Increasing FW boluses and 1x dose Lasix 80 for hypernatremia  - 11/20 intermittent doses of Lasix when necessary avoid scheduling. Titrate pressors as needed        Renal/  Metabolic alkalosis  Bicarb > 40, concern for contraction alkalosis in setting of aggressive diuressis; improved with diamox  Continuing to follow CMP and ABGs as indicated; improving on ABG  Lasix 1x IV 80 11/19  Alkalosis improved on subsequent ABGs      ROSEMARIE (acute kidney injury)  ROSEMARIE vs ROSEMARIE on CKD  follow I/O, crt/bun  Avoid nephrotoxins, diuresis as appropriate    ID  MSSA bacteremia  Blood Culture NGTD  Febrile t/o today; WBC persistently high (stable)  Zosyn, Vanc switched to Oxacillin per ID     11/20 ID recommending SIVA but not stable for this, or at least not worth possible risk unless family determines full care is within scope of goals    Palliative Care  Advance care planning  See palliative care encounter     Palliative care encounter  Palliative following.     -family visit 11/18, see ACP notes. Pending family decision making           The patient is being Prophylaxed for:  Venous Thromboembolism with: Mechanical or Chemical  Stress Ulcer with: H2B  Ventilator Pneumonia with: chlorhexidine oral care    Activity Orders          Turn patient starting at 11/15 0000    Elevate HOB starting at 11/14 2326    Diet NPO: NPO starting at 11/14 2326        Partial Code     Dispo: Remains in ICU for substantial critical illness. Awaiting family decision making, see ACP note from 11/18.  No contact from family today.    Critical condition in that Patient has a condition that poses threat to life and bodily function: see associated  documentation     45 minutes of Critical care time was spent personally by me on the following activities: development of treatment plan with patient or surrogate and bedside caregivers, discussions with consultants, evaluation of patient's response to treatment, examination of patient, ordering and performing treatments and interventions, ordering and review of laboratory studies, ordering and review of radiographic studies, pulse oximetry, antibiotic titration if applicable, vasopressor titration if applicable, re-evaluation of patient's condition. This critical care time did not overlap with that of any other provider or involve time for any procedures. There is high probability for acute neurological change leading to clinical and possibly life-threatening deterioration requiring highest level of physician preparedness for urgent intervention.      Klaus Ferraro, DO  Neurocritical Care  [facility] - Neuro Critical Care

## 2022-11-21 NOTE — PT/OT/SLP PROGRESS
Occupational Therapy   Treatment    Name: Enrico Burnett  MRN: [id number]  Admitting Diagnosis:  SAH (subarachnoid hemorrhage)       Recommendations:     Discharge Recommendations:  (pending extubation)  Discharge Equipment Recommendations:   (pending extubation)  Barriers to discharge:  None    Assessment:     Enrico Burnett is a 67 y.o. male with a medical diagnosis of SAH (subarachnoid hemorrhage).  He presents with performance deficits affecting function are weakness, abnormal tone, decreased ROM, impaired cognition, impaired endurance, impaired sensation, decreased coordination, decreased upper extremity function, impaired self care skills, impaired functional mobility, gait instability, impaired balance, impaired cardiopulmonary response to activity, decreased safety awareness, decreased lower extremity function, impaired fine motor, impaired coordination.     Rehab Prognosis:  Good; patient would benefit from acute skilled OT services to address these deficits and reach maximum level of function.       Plan:     Patient to be seen 3 x/week to address the above listed problems via therapeutic activities, self-care/home management, therapeutic exercises, neuromuscular re-education, cognitive retraining  Plan of Care Expires: 12/13/22  Plan of Care Reviewed with: patient    Subjective   Patient:  nonverbal; intubated  Pain/Comfort:  Pain Rating 1: 0/10  Pain Rating Post-Intervention 1: 0/10    Objective:     Communicated with: Nurse prior to session.  Patient found supine with bed alarm, restraints, SCD, telemetry, pulse ox (continuous), ventilator, EEG, pressley catheter, peripheral IV, blood pressure cuff, bowel management system, NG tube, PICC line, pressure relief boots upon OT entry to room.    General Precautions: Standard, aspiration, fall, NPO   Orthopedic Precautions:N/A   Braces: N/A  Respiratory Status:  ventilator     Occupational Performance:     Bed Mobility:    Patient completed Rolling/Turning to  Left with  total assistance  Patient completed Rolling/Turning to Right with total assistance     Functional Mobility/Transfers:  Dependent drawsheet transfers    Activities of Daily Living:  Feeding:  NPO    Grooming: total assistance while supine    [facility] 6 Click ADL: 6    Treatment & Education:  Patient education provided on role of OT.  Daily orientation provided.  Manual lymph drainage techniques performed prior to ROM.  PROM performed bilateral UE/LEs one set x 10 rep in all planes of motion with stretches provided at end range; sustained stretch provided for ankle dorsiflexion.   Provided multi-sensory stimulation to prevent sensory deprivation and improve clinical outcomes.  Positioning provided for midline orientation with bilateral UEs elevated and heels lifted off mattress; positioning provided to prevent flexor synergy pattern in UE (internal rotation and adduction) to decrease risk of post-stroke hemiplegic pain.   Gentle cervical rotation provided.   Family not present during the session.  Continued education, patient/ family training recommended.    Patient left supine with all lines intact, call button in reach, bed alarm on, and restraints reapplied at end of session    GOALS:   Multidisciplinary Problems       Occupational Therapy Goals          Problem: Occupational Therapy    Goal Priority Disciplines Outcome Interventions   Occupational Therapy Goal     OT, PT/OT Ongoing, Progressing    Description: Goals set 11/15 to be addressed for 14 days with expiration date, 11/29:  Patient will increase functional independence with ADLs by performing:    Patient will demonstrate rolling to the right with max assist.  Not met   Patient will demonstrate rolling to the left with max assist.   Not met  Patient will demonstrate supine -sit with max  assist.   Not met  Patient will demonstrate stand pivot transfers with max assist.   Not met  Patient will demonstrate grooming while seated with max assist.   Not  met  Patient will demonstrate upper body dressing with max assist while seated EOB.   Not met  Patient will demonstrate lower body dressing with max assist while seated EOB.   Not met  Patient will demonstrate toileting with max assist.   Not met  Patient will demonstrate bathing while seated EOB with max assist.   Not met  Patient's family / caregiver will demonstrate independence and safety with assisting patient with self-care skills and functional mobility.     Not met  Patient's family / caregiver will demonstrate independence with providing ROM and changes in bed positioning.   Not met  Patient and/or patient's family will verbalize understanding of stroke prevention guidelines, personal risk factors and stroke warning signs via teachback method.  Not met                                  Time Tracking:     OT Date of Treatment: 11/21/22  OT Start Time: 0335  OT Stop Time: 0345  OT Total Time (min): 10 min    Billable Minutes:Neuromuscular Re-education 10    OT/EDDI: OT          11/21/2022

## 2022-11-21 NOTE — PROGRESS NOTES
"[facility] Critical Care  Infectious Disease  Progress Note    Patient Name: Enrico Burnett  MRN: [id number]  Admission Date: 11/14/2022  Length of Stay: 6 days  Attending Physician: Klaus Ferraro DO  Primary Care Provider: Mark Rocha MD    Isolation Status: No active isolations  Assessment/Plan:      Fever in adult  May be related to his underlying subarachnoid hemorrhage.  Blood cultures are now negative so unlikely related to bloodstream infection.    Subarachnoid bleed  Management per neurosurgery.    MSSA bacteremia  67 year admitted to outside hospital for CHF exacerbation.  Patient noted to be altered and brain imaging showed intracranial bleed. His hospital course was complicated by MSSA bacteremia. Source unclear. He has some superficial bruising to his skin in the lower extremities.  Also possible the bacteremia is nosocomial.  Unclear how long patient may have been bacteremic.  Blood cultures obtained at [facility] are now negative.    1. Continue oxacillin.    2. SIVA if in line with family goals of care.        Anticipated Disposition: TBD    Thank you for your consult. I will follow-up with patient. Please contact us if you have any additional questions.    Mitul Mtz MD  Infectious Disease  [facility] Critical Care    Subjective:     Principal Problem:SAH (subarachnoid hemorrhage)    HPI: A 67-year-old man with CAD-MI, HF, HTN, DM2, diverticulosis who was admitted to [facility] after recurrent falls, "worsening respiratory status", and edema. His hospital course was complicated by [employer] with rvr, seizure like activity requiring intubation for airway protection, SAH, and MSSA bacteremia (11/11). He was transferred to [facility] [place] for further management of SAH. On transfer he ws started on cefepime and vancomycin.    Infectious Diseases consulted for "Staph bacteremia in critically ill patient"        Interval History:     Remains critically ill.    Review of Systems "   Unable to perform ROS: Intubated   Objective:     Vital Signs (Most Recent):  Temp: 98.8 °F (37.1 °C) (11/20/22 2005)  Pulse: 101 (11/20/22 2005)  Resp: (!) 25 (11/20/22 2005)  BP: (!) 146/67 (11/20/22 2005)  SpO2: 98 % (11/20/22 2005)   Vital Signs (24h Range):  Temp:  [97.5 °F (36.4 °C)-101.7 °F (38.7 °C)] 98.8 °F (37.1 °C)  Pulse:  [] 101  Resp:  [20-90] 25  SpO2:  [90 %-99 %] 98 %  BP: ()/(50-88) 146/67     Weight: 100 kg (220 lb 7.4 oz)  Body mass index is 29.9 kg/m².    Estimated Creatinine Clearance: 45.1 mL/min (A) (based on SCr of 2 mg/dL (H)).    Physical Exam  Vitals reviewed.   Constitutional:       Appearance: He is well-developed.      Interventions: He is intubated.   HENT:      Head: Normocephalic and atraumatic.      Right Ear: External ear normal.      Left Ear: External ear normal.   Eyes:      Conjunctiva/sclera: Conjunctivae normal.   Neck:      Thyroid: No thyromegaly.   Cardiovascular:      Rate and Rhythm: Normal rate and regular rhythm.      Heart sounds: Normal heart sounds. No murmur heard.  Pulmonary:      Effort: Pulmonary effort is normal. He is intubated.      Breath sounds: Normal breath sounds. No wheezing or rales.   Chest:      Comments: Left sided cardiac device without erythema of the skin.  Abdominal:      General: Bowel sounds are normal.      Palpations: Abdomen is soft. There is no mass.      Tenderness: There is no abdominal tenderness. There is no rebound.   Musculoskeletal:      Cervical back: Normal range of motion and neck supple.   Lymphadenopathy:      Cervical: No cervical adenopathy.   Skin:     General: Skin is warm and dry.      Findings: Bruising present.   Neurological:      Mental Status: He is lethargic.      Comments: Does not respond to verbal or tactile stimuli       Significant Labs:   Microbiology Results (last 7 days)       Procedure Component Value Units Date/Time    Blood culture [[id number]] Collected: 11/18/22 3555    Order Status:  Completed Specimen: Blood from Peripheral, Upper Arm, Left Updated: 11/20/22 1812     Blood Culture, Routine No Growth to date      No Growth to date      No Growth to date    Blood culture [[id number]] Collected: 11/18/22 1439    Order Status: Completed Specimen: Blood from Peripheral, Upper Arm, Right Updated: 11/20/22 1812     Blood Culture, Routine No Growth to date      No Growth to date      No Growth to date    Blood culture [[id number]] Collected: 11/15/22 0019    Order Status: Completed Specimen: Blood from Peripheral, Forearm, Left Updated: 11/20/22 0612     Blood Culture, Routine No growth after 5 days.    Narrative:      Blood cultures x 2 different sites. 4 bottles total. Please  draw cultures before administering antibiotics.    Blood culture [[id number]] Collected: 11/15/22 0018    Order Status: Completed Specimen: Blood from Peripheral, Lower Leg, Right Updated: 11/20/22 0612     Blood Culture, Routine No growth after 5 days.    Narrative:      Blood cultures from 2 different sites. 4 bottles total.  Please draw before starting antibiotics.    Culture, Respiratory with Gram Stain [[id number]]  (Abnormal) Collected: 11/15/22 0954    Order Status: Completed Specimen: Respiratory from Endotracheal Aspirate Updated: 11/17/22 1356     Respiratory Culture No S aureus or Pseudomonas isolated.      BELA DUBLINIENSIS  Few       Gram Stain (Respiratory) <10 epithelial cells per low power field.     Gram Stain (Respiratory) Rare  WBC's     Gram Stain (Respiratory) Rare yeast    Narrative:      Mini-BAL.            Significant Imaging: I have reviewed all pertinent imaging results/findings within the past 24 hours.

## 2022-11-21 NOTE — ASSESSMENT & PLAN NOTE
Rate stable t/o hospital course; episode of tachycardia 11/18 s/p amio 150mg bolus IV  Began SQH DVT ppx     11/20 intermittent spells of RVR which drop pressure substantially, restarted on amio gtt

## 2022-11-21 NOTE — ASSESSMENT & PLAN NOTE
Unable to view any documentation from OSH, it was mentioned that EF was 20-25%, and diastolic function was also decreased. Repeat TTE at [facility] [place] with EF 10-15%.     Now requiring levo gtt for MAP>60 wean as able  EKG w/ atrial flutter and AV block; follow-up EKG with AF (without RVR)  -It did appear patient may have been on Entresto at some point, so possible acute on chronic failure    -improving pulmonary edema on CXR 11/17  -Increasing FW boluses and 1x dose Lasix 80 for hypernatremia  - 11/20 intermittent doses of Lasix when necessary avoid scheduling. Titrate pressors as needed

## 2022-11-21 NOTE — SIGNIFICANT EVENT
Called to bedside by RN for RUE rhythmic jerking, similar in appearance to event last night. Lip smacking and facial twitching once again present. HR, BP and RR elevated. 2 mg ativan administered IV with resolution of abnormal movements. AM Keppra dose administered and EEG order placed.     Aura Edgar PA-C  Neuro Critical Care  [id number]

## 2022-11-21 NOTE — PLAN OF CARE
[facility] Neuro Critical [facility]  Discharge Reassessment    Primary Care Provider: Mark Rocha MD    Expected Discharge Date: 11/30/2022    Patient intubated on vent.  Not medically stable for discharge.  Per MD: family will be coming in for family meeting possibly tomorrow and would like pal care present.     Reassessment (most recent)       Discharge Reassessment - 11/21/22 1642          Discharge Reassessment    Assessment Type Discharge Planning Reassessment     Did the patient's condition or plan change since previous assessment? No     Communicated SHANIQUA with patient/caregiver Date not available/Unable to determine     Discharge Plan A Long-term acute care facility (LTAC)     Discharge Plan B Skilled Nursing Facility     DME Needed Upon Discharge  other (see comments)   tbd    Discharge Barriers Identified None     Why the patient remains in the hospital Requires continued medical care                   Danika Causey RN, CCRN-K, [facility]  Neuro-Critical Care   [phone]

## 2022-11-21 NOTE — ASSESSMENT & PLAN NOTE
Arrived on keppra, unable to view/assess home meds or meds prior to arrival. OSH documented seizure like activity but no EEG records obtained.     Off fentanyl and versed with continued difficulty arousing   Keppra 500 bid IV  Formal EEG without seizure activity     11/20: another event overnight, loaded with extra keppra with resolution, CAP EEG negative

## 2022-11-21 NOTE — SUBJECTIVE & OBJECTIVE
Interval History:     Remains critically ill.    Review of Systems   Unable to perform ROS: Intubated   Objective:     Vital Signs (Most Recent):  Temp: 98.8 °F (37.1 °C) (11/20/22 2005)  Pulse: 101 (11/20/22 2005)  Resp: (!) 25 (11/20/22 2005)  BP: (!) 146/67 (11/20/22 2005)  SpO2: 98 % (11/20/22 2005)   Vital Signs (24h Range):  Temp:  [97.5 °F (36.4 °C)-101.7 °F (38.7 °C)] 98.8 °F (37.1 °C)  Pulse:  [] 101  Resp:  [20-90] 25  SpO2:  [90 %-99 %] 98 %  BP: ()/(50-88) 146/67     Weight: 100 kg (220 lb 7.4 oz)  Body mass index is 29.9 kg/m².    Estimated Creatinine Clearance: 45.1 mL/min (A) (based on SCr of 2 mg/dL (H)).    Physical Exam  Vitals reviewed.   Constitutional:       Appearance: He is well-developed.      Interventions: He is intubated.   HENT:      Head: Normocephalic and atraumatic.      Right Ear: External ear normal.      Left Ear: External ear normal.   Eyes:      Conjunctiva/sclera: Conjunctivae normal.   Neck:      Thyroid: No thyromegaly.   Cardiovascular:      Rate and Rhythm: Normal rate and regular rhythm.      Heart sounds: Normal heart sounds. No murmur heard.  Pulmonary:      Effort: Pulmonary effort is normal. He is intubated.      Breath sounds: Normal breath sounds. No wheezing or rales.   Chest:      Comments: Left sided cardiac device without erythema of the skin.  Abdominal:      General: Bowel sounds are normal.      Palpations: Abdomen is soft. There is no mass.      Tenderness: There is no abdominal tenderness. There is no rebound.   Musculoskeletal:      Cervical back: Normal range of motion and neck supple.   Lymphadenopathy:      Cervical: No cervical adenopathy.   Skin:     General: Skin is warm and dry.      Findings: Bruising present.   Neurological:      Mental Status: He is lethargic.      Comments: Does not respond to verbal or tactile stimuli       Significant Labs:   Microbiology Results (last 7 days)       Procedure Component Value Units Date/Time    Blood  culture [[id number]] Collected: 11/18/22 1439    Order Status: Completed Specimen: Blood from Peripheral, Upper Arm, Left Updated: 11/20/22 1812     Blood Culture, Routine No Growth to date      No Growth to date      No Growth to date    Blood culture [[id number]] Collected: 11/18/22 1439    Order Status: Completed Specimen: Blood from Peripheral, Upper Arm, Right Updated: 11/20/22 1812     Blood Culture, Routine No Growth to date      No Growth to date      No Growth to date    Blood culture [[id number]] Collected: 11/15/22 0019    Order Status: Completed Specimen: Blood from Peripheral, Forearm, Left Updated: 11/20/22 0612     Blood Culture, Routine No growth after 5 days.    Narrative:      Blood cultures x 2 different sites. 4 bottles total. Please  draw cultures before administering antibiotics.    Blood culture [[id number]] Collected: 11/15/22 0018    Order Status: Completed Specimen: Blood from Peripheral, Lower Leg, Right Updated: 11/20/22 0612     Blood Culture, Routine No growth after 5 days.    Narrative:      Blood cultures from 2 different sites. 4 bottles total.  Please draw before starting antibiotics.    Culture, Respiratory with Gram Stain [[id number]]  (Abnormal) Collected: 11/15/22 0954    Order Status: Completed Specimen: Respiratory from Endotracheal Aspirate Updated: 11/17/22 1356     Respiratory Culture No S aureus or Pseudomonas isolated.      BELA DUBLINIENSIS  Few       Gram Stain (Respiratory) <10 epithelial cells per low power field.     Gram Stain (Respiratory) Rare  WBC's     Gram Stain (Respiratory) Rare yeast    Narrative:      Mini-BAL.            Significant Imaging: I have reviewed all pertinent imaging results/findings within the past 24 hours.

## 2022-11-21 NOTE — SUBJECTIVE & OBJECTIVE
Objective:     Vitals:  Temp: 98.6 °F (37 °C)  Pulse: 98  Rhythm: paced rhythm, atrial rhythm  BP: 103/68  MAP (mmHg): 80  Resp: (!) 23  SpO2: 99 %  Oxygen Concentration (%): 40  O2 Device (Oxygen Therapy): ventilator  Vent Mode: A/C  Set Rate: 20 BPM  Vt Set: 460 mL  PEEP/CPAP: 8 cmH20  Peak Airway Pressure: 25 cmH2O  Mean Airway Pressure: 12 cmH20  Plateau Pressure: 0 cmH20    Temp  Min: 97.5 °F (36.4 °C)  Max: 101.7 °F (38.7 °C)  Pulse  Min: 98  Max: 156  BP  Min: 74/52  Max: 125/61  MAP (mmHg)  Min: 56  Max: 95  Resp  Min: 20  Max: 90  SpO2  Min: 90 %  Max: 99 %  Oxygen Concentration (%)  Min: 40  Max: 40    11/19 0701 - 11/20 0700  In: 1810   Out: 2995 [Urine:2895; Drains:100]   Unmeasured Output  Urine Occurrence: 1  Stool Occurrence: 1  Pad Count: 2       Physical Exam  Constitutional:       Appearance: He is obese. He is ill-appearing.      Comments: Intubated; opening eyes to verbal stimulation. Not on sedation   HENT:      Head: Normocephalic and atraumatic.      Right Ear: External ear normal.      Ears:      Comments: Necrotic wound present on L ear      Nose: Nose normal.      Mouth/Throat:      Mouth: Mucous membranes are moist.      Comments: ETT in place  Eyes:      Conjunctiva/sclera: Conjunctivae normal.   Cardiovascular:      Rate and Rhythm: Tachycardia. Rhythm irregular.      Heart sounds: Normal heart sounds.   Pulmonary:      Breath sounds: Normal breath sounds.      Comments: Ventilated breath sounds equal bilaterally   Abdominal:      General: Abdomen is flat. There is no distension.      Palpations: Abdomen is soft.   Musculoskeletal:      Cervical back: Neck supple.      Right lower leg: No edema.      Left lower leg: No edema.      Comments: Multiple wounds over bilateral LE; dressings CDI. WC following    Skin:     General: Skin is warm and dry. Wounds, skin tears and multiple keratosis throughout bilateral UE and LE.      Capillary Refill: Capillary refill takes less than 2 seconds.    Neurological:      Comments: Eyes open with tactile and verbal stimulation. Although remains difficult to arouse. Withdrawal to painful stimulus on LUE; involuntary twitching movements but no withdrawal on the RUE.  Weak withdrawal to painful stimulus on LE.     Medications:  Continuousamiodarone in dextrose 5%, Last Rate: 0.5 mg/min (11/20/22 1905)  dextrose 10 % in water (D10W)  NORepinephrine bitartrate-D5W, Last Rate: Stopped (11/20/22 1342)    Scheduledfamotidine (PF), 20 mg, Daily  furosemide (LASIX) injection, 80 mg, Once  heparin (porcine), 5,000 Units, Q8H  insulin aspart U-100, 8 Units, 6 times per day  levetiracetam IV, 1,000 mg, Q12H  oxacillin 12 g in  mL CONTINUOUS INFUSION, 12 g, Q24H    PRNacetaminophen, 650 mg, Q6H PRN  albuterol-ipratropium, 3 mL, Q4H PRN  dextrose 10 % in water (D10W), , Continuous PRN  dextrose 10%, 12.5 g, PRN  dextrose 10%, 25 g, PRN  glucagon (human recombinant), 1 mg, PRN  insulin aspart U-100, 1-10 Units, Q4H PRN  labetalol, 10 mg, Q4H PRN  sodium chloride 0.9%, 10 mL, PRN      Today I personally reviewed pertinent medications, lines/drains/airways, imaging, cardiology results, laboratory results, microbiology results,    Diet  Diet NPO  Diet NPO    TF lowered rate today

## 2022-11-21 NOTE — NURSING
PA notified of possible seizure activity shaking to right arm. Heart rate states it spikes up to 200 but manually is 120-130. PRN ativan ordered.

## 2022-11-21 NOTE — NURSING
Notified SANDRA Edgar of temp 100.7. Cooling blanket on. States she will order OGT tylenol as pt has flexiseal

## 2022-11-21 NOTE — ASSESSMENT & PLAN NOTE
Blood Culture NGTD  Febrile t/o today; WBC persistently high (stable)  Zosyn, Vanc switched to Oxacillin per ID     11/20 ID recommending SIVA but not stable for this, or at least not worth possible risk unless family determines full care is within scope of goals

## 2022-11-21 NOTE — PROGRESS NOTES
[facility] Neuro Critical Care  Palliative Medicine  Progress Note    Patient Name: Enrico Burnett  MRN: [id number]  Admission Date: 11/14/2022  Hospital Length of Stay: 7 days  Code Status: Partial Code   Attending Provider: Klaus Ferraro DO  Consulting Provider: Carol Das, CNS  Primary Care Physician: Mark Rocha MD  Principal Problem:SAH (subarachnoid hemorrhage)    Patient information was obtained from primary team.      Assessment/Plan:     Palliative care encounter  Impression: Pt is a 66 y/o male with  with known PMH of Afib who is transferred to neuro ICU critically ill after prolonged hospital stay at outside facility with concern for multiple parenchymal ICH in the setting of supratherapeutic INR.  Pt is on vent. EEG in process. Pt is have MRI today. Pt is a partial code.     Reason for consult: GOC/ACP. Per Neuro MD, family will be coming in for family meeting possibly tomorrow and would like pal care present.     Goals of care/ACP:   Today: No family at bedside. Per attending team, plan is to continue medical management for now. Family to visit when able. See acp note.   Pal care will reach out to pt's wife for support.     11-16-22  No family at bedside. Pt unable to participate in conversation. Pt's wife [place] called per this MARICHUY and Jennifer Castle LCSW. Per [place] she and pt live in [place] with son, Enrico. Pt's other son lives in [place] with his wife who is in the . They are trying to come in tomorrow to visit pt. Per wife, she will not be coming today. She is aware of pt's medical issues and that pt will have MRI today. Per wife, she does not have a mode of transportation and will get rides from [employer] members.  Wife is aware that primary team wants to have a family meeting once son arrives to visit pt.  Per pt's wife, plan if to continue medical management at this time. Awaiting testing results.     Pal care will continue to follow and meet with pt 's wife and family  when they visit.     MPOA: Pt's wife Irma Burnett is NOK.   Code status: Partial.       Symptom management:   SAH (subarachnoid hemorrhage)- managed per primary team  Diffuse SAH noted on CTH after concern for seizure  No know trauma, though INR was supra-theraputic.   CTA head read as no concern for Aneurysm/avm  MRI brain without, MRA brain without pending    Family awaiting further testing.      Seizure managed per primary team  Arrived on keppra, unable to view/assess home meds or meds prior to arrival  Cont. Versed gtt, byt wean from 5 to 3mg/hr  Keppra 1gm bid IV  EEG in process     Pulmonary  Acute respiratory failure with hypoxia and hypercapnia- managed per primary team  Arrived on vent, PAo2 60% with peep 5  Bilateral effusion on CT chest    Pt still on vent.       Pt does not appear to be in distress.     Plan:   Per Neuro MD, they will be having family meeting when family can visit again.   Will reach out to pt's wife for continued support.   Pal care will meet with family when they visit.         I will follow-up with patient. Please contact us if you have any additional questions.    Subjective:     Chief Complaint: No chief complaint on file.      HPI:   Pt is a 68 y/o male, transferred for eval and management for SAH. No notes sent with patient, unable to get exact events/timeline. It appears he was being treated for CHF, and had seizure acitivy over the weekend requiring intuabtion. CTH showed diffuse SAH, CTA read as no Aneurysm/avm. INR was supratheraputic a few days ago > 4, but had decreased to 2's last few days,. No reports of trauma, possible spontaneuous in setting of supratheraputic INR. PCC and Vit K given for elevated INR. DDAVP given for ASA. [place] [facility] [place].         No past medical history on file.  No past surgical history on file.   No current facility-administered medications on file prior to encounter.      No current outpatient medications on file  prior to encounter.      Pt is on Vent, EEG in process.   Partial code.       Hospital Course:  No notes on file    Interval History: Family to visit per attending service note.     Past Medical History:   Diagnosis Date    CAD (coronary artery disease)     CHF (congestive heart failure)     Chronic a-fib     CKD (chronic kidney disease) stage 3, GFR 30-59 ml/min     Diabetes mellitus type 2 in obese     HTN (hypertension), benign     Hypothyroid     Obesity (BMI 30-39.9)     Seizures     Venous stasis        Past Surgical History:   Procedure Laterality Date    CORONARY ARTERY BYPASS GRAFT      2 vessel    INSERTION OF PACEMAKER  2018    RENAL BIOPSY      TONSILLECTOMY         Review of patient's allergies indicates:  Not on File    Medications:  Continuous Infusions:   amiodarone in dextrose 5% 0.5 mg/min (22 0805)    dextrose 10 % in water (D10W)      NORepinephrine bitartrate-D5W Stopped (22 1342)     Scheduled Meds:   famotidine (PF)  20 mg Intravenous Daily    heparin (porcine)  5,000 Units Subcutaneous Q8H    insulin aspart U-100  8 Units Subcutaneous 6 times per day    levetiracetam IV  1,000 mg Intravenous Q12H    oxacillin 12 g in  mL CONTINUOUS INFUSION  12 g Intravenous Q24H     PRN Meds:acetaminophen, acetaminophen, albuterol-ipratropium, dextrose 10 % in water (D10W), dextrose 10%, dextrose 10%, glucagon (human recombinant), insulin aspart U-100, labetalol, sodium chloride 0.9%    Family History    None       Tobacco Use    Smoking status: Former     Types: Cigarettes     Quit date:      Years since quittin.9    Smokeless tobacco: Never   Substance and Sexual Activity    Alcohol use: Not Currently    Drug use: Never    Sexual activity: Not on file       Review of Systems   Unable to perform ROS: Acuity of condition   Objective:     Vital Signs (Most Recent):  Temp: 100.2 °F (37.9 °C) (22)  Pulse: 106 (22)  Resp: (!) 24  (11/21/22 0852)  BP: (!) 82/56 (11/21/22 0805)  SpO2: 99 % (11/21/22 0852)   Vital Signs (24h Range):  Temp:  [97.9 °F (36.6 °C)-101.7 °F (38.7 °C)] 100.2 °F (37.9 °C)  Pulse:  [] 106  Resp:  [20-41] 24  SpO2:  [91 %-99 %] 99 %  BP: ()/(50-85) 82/56     Weight: 100 kg (220 lb 7.4 oz)  Body mass index is 29.9 kg/m².    Physical Exam  Constitutional:       General: He is not in acute distress.     Appearance: He is overweight. He is ill-appearing.      Interventions: He is intubated.      Comments: EEG in process   HENT:      Head:      Comments: Bandage in head for EEG  Cardiovascular:      Rate and Rhythm: Normal rate and regular rhythm.   Pulmonary:      Effort: No respiratory distress. He is intubated.   Musculoskeletal:      Comments: No edema noted.    Skin:     General: Skin is warm and dry.   Neurological:      Mental Status: He is unresponsive.      Comments: On vent.        Review of Symptoms      Symptom Assessment (ESAS 0-10 Scale)  Pain:  0  Dyspnea:  0  Anxiety:  0  Nausea:  0  Depression:  0  Anorexia:  0  Fatigue:  0  Insomnia:  0  Restlessness:  0  Agitation:  0 due to Acuity of condition         Pain Assessment in Advanced Demential Scale (PAINAD)   Breathing - Independent of vocalization:  0  Negative vocalization:  0  Facial expression:  0  Body language:  0  Consolability:  0  Total:  0    Performance Status:  10    Living Arrangements:  Lives with family    Psychosocial/Cultural: Pt lives with wife of 44 years. Pt's son Panfilo lives with them and is caregiver when wife broke both arms and shoulders this year. They have a large Oriental orthodox family.   Pt's son, Marino live in [place] with his wife who is in the [employer].      Spiritual:  F - Johana and Belief:  Voodoo  C - Community:  Yes- large johana based community.   A - Address in Care:  Pt's  has visited pt.       Advance Care Planning   Advance Directives:   LaPOST: No    Do Not Resuscitate: partial.    Medical Power of  : No    Agent's Name:  Wife, Irma is NOK    Decision Making:  Family answered questions and Patient unable to communicate due to disease severity/cognitive impairment  Goals of Care: What is most important right now is to focus on improvement in condition but with limits to invasive therapies, comfort and QOL . Accordingly, we have decided that the best plan to meet the patient's goals includes continuing with treatment.       Significant Labs: All pertinent labs within the past 24 hours have been reviewed.  CBC:   Recent Labs   Lab 11/21/22 0041   WBC 11.53   HGB 12.4*   HCT 42.1   *          BMP:  Recent Labs   Lab 11/21/22 0041   *  228*   *  159*   K 3.4*  3.5   *  114*   CO2 30*  31*   *  118*   CREATININE 2.0*  1.9*   CALCIUM 9.1  9.0   MG 2.3       LFT:  Lab Results   Component Value Date     (H) 11/21/2022    ALKPHOS 117 11/21/2022    BILITOT 1.2 (H) 11/21/2022     Albumin:   Albumin   Date Value Ref Range Status   11/21/2022 1.2 (L) 3.5 - 5.2 g/dL Final     Protein:   Total Protein   Date Value Ref Range Status   11/21/2022 6.4 6.0 - 8.4 g/dL Final     Lactic acid:   Lab Results   Component Value Date    LACTATE 2.5 (H) 11/18/2022    LACTATE 1.8 11/15/2022       Significant Imaging: I have reviewed all pertinent imaging results/findings within the past 24 hours.      > 50% of  35 min visit spent in chart review, face to face discussion of goals of care,  symptom assessment, coordination of care and emotional support    Carol Das, CNS  Palliative Medicine  [facility]

## 2022-11-21 NOTE — ASSESSMENT & PLAN NOTE
67 year admitted to outside hospital for CHF exacerbation.  Patient noted to be altered and brain imaging showed intracranial bleed. His hospital course was complicated by MSSA bacteremia. Source unclear. He has some superficial bruising to his skin in the lower extremities.  Also possible the bacteremia is nosocomial.  Unclear how long patient may have been bacteremic.  Blood cultures obtained at [facility] are now negative.    1. Continue oxacillin.    2. SIVA if in line with family goals of care.
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
## 2022-11-21 NOTE — ASSESSMENT & PLAN NOTE
Unsure of cause on admissed, it was mentioned when calling for transfer that patient may be bacteremic/sepsie. Aggressive diuresis over past few weeks/days, hypovolemia. AEDs versed gtt, and possible propofol prior to transfer. Required Levo for SBP > 100 and MAP > 65    -goal MAP>60 in setting of HFrEF EF 10-15%  -levo gtt    11/20  Continuing intermittent hypotension in setting of RVR, amiodarone gtt started with improvement

## 2022-11-21 NOTE — ASSESSMENT & PLAN NOTE
E1 V1 M3  Off sedation with difficulty arousing, although opens eyes with verbal stimulation.  No command following. Continue AEDS.

## 2022-11-21 NOTE — PLAN OF CARE
[facility] Care Plan    POC reviewed with Enrico Burnett  at 0300. Pt unable to verbalize understanding. Pt progressing toward goals. Will continue to monitor. See below and flowsheets for full assessment and VS info.     Goals of care meeting was delayed over the weekend as family reportedly could not come in from out of town.  Oxacillin and amio infusing.  ETT 23 at lips.  Fentanyl given x1 for tachypnea.  PO tylenol given for temp of 100.7 and cooling blanket applied.  OGT in place, 120 residuals returned to pt. At rate 30 ccs/hr as pt reportedly had high residuals on day shift over the weekend.Team is aware.  530 am some right arm twitching/seizure like activity reported to PA. Ativan 2mg given and 9am keppra rescheduled for 545 and given  Q4 accuchecks with scheduled and sliding scale insulin.  Condom cath and flexi seal in place.      Is this a stroke patient? Yes - reviewed booklet with pt who is intubated and unable to verbalize understanding    Neuro:  [place] Coma Scale  Best Eye Response: 1-->(E1) none (inconsistently opens eye to voice,)  Best Motor Response: 4-->(M4) withdraws from pain  Best Verbal Response: 1-->(V1) none  [place] Coma Scale Score: 6  Assessment Qualifiers: patient intubated  Pupil PERRLA: no     24hr Temp:  [97.5 °F (36.4 °C)-101.7 °F (38.7 °C)]     CV:   Rhythm: paced rhythm, atrial rhythm  BP goals:   SBP < 160  MAP >  60    Resp:   O2 Device (Oxygen Therapy): ventilator  Vent Mode: A/C  Set Rate: 20 BPM  Oxygen Concentration (%): 40  Vt Set: 460 mL  PEEP/CPAP: 8 cmH20  Pressure Support: 10 cmH20    Plan: N/A    GI/:     Diet/Nutrition Received: tube feeding  Last Bowel Movement: 11/21/22  Voiding Characteristics: external catheter    Intake/Output Summary (Last 24 hours) at 11/21/2022 0631  Last data filed at 11/21/2022 0605  Gross per 24 hour   Intake 4679.59 ml   Output 3182 ml   Net 1497.59 ml     Unmeasured Output  Urine Occurrence: 1  Stool Occurrence: 1  Pad Count:  2    Labs/Accuchecks:  Recent Labs   Lab 11/21/22 0041   WBC 11.53   RBC 4.05*   HGB 12.4*   HCT 42.1         Recent Labs   Lab 11/21/22 0041   *  159*   K 3.4*  3.5   CO2 30*  31*   *  114*   *  118*   CREATININE 2.0*  1.9*   ALKPHOS 117   ALT 66*   *   BILITOT 1.2*      Recent Labs   Lab 11/21/22 0041   INR 1.6*   APTT 31.4      Recent Labs   Lab 11/15/22  0255   *   CPKMB 1.5   TROPONINI 0.205*   MB 0.2       Electrolytes: Electrolytes replaced  Accuchecks: Q4H    Gtts:   amiodarone in dextrose 5% 0.5 mg/min (11/21/22 0605)    dextrose 10 % in water (D10W)      NORepinephrine bitartrate-D5W Stopped (11/20/22 1342)       LDA/Wounds:  Lines/Drains/Airways       Drain  Duration                  NG/OG Tube Center mouth -- days    Male External Urinary Catheter 11/15/22 1100 Small 5 days         Rectal Tube 11/17/22 0445 rectal tube w/ balloon (indicate number of mLs) 4 days              Airway  Duration                  Airway - Non-Surgical -- days              Peripheral Intravenous Line  Duration                  Peripheral IV - Single Lumen 11/17/22 1549 20 G Anterior;Right Forearm 3 days         Peripheral IV - Single Lumen 11/17/22 1550 20 G Anterior;Left Forearm 3 days         Peripheral IV - Single Lumen 11/17/22 1553 18 G Anterior;Right Upper Arm 3 days         Peripheral IV - Single Lumen 11/17/22 1556 20 G Anterior;Left Antecubital 3 days                  Wounds: Yes  Wound care consulted: Yes

## 2022-11-21 NOTE — ASSESSMENT & PLAN NOTE
Impression: Pt is a 66 y/o male with  with known PMH of Afib who is transferred to neuro ICU critically ill after prolonged hospital stay at outside facility with concern for multiple parenchymal ICH in the setting of supratherapeutic INR.  Pt is on vent. EEG in process. Pt is have MRI today. Pt is a partial code.     Reason for consult: GOC/ACP. Per Neuro MD, family will be coming in for family meeting possibly tomorrow and would like pal care present.     Goals of care/ACP:   Today: No family at bedside. Per attending team, plan is to continue medical management for now. Family to visit when able. See acp note.   Pal care will reach out to pt's wife for support.     11-16-22  No family at bedside. Pt unable to participate in conversation. Pt's wife Irma called per this MARICUHY and Jennifer Castle LCSW. Per [place] she and pt live in [place] with son, Enrico. Pt's other son lives in [place] with his wife who is in the . They are trying to come in tomorrow to visit pt. Per wife, she will not be coming today. She is aware of pt's medical issues and that pt will have MRI today. Per wife, she does not have a mode of transportation and will get rides from [employer].  Wife is aware that primary team wants to have a family meeting once son arrives to visit pt.  Per pt's wife, plan if to continue medical management at this time. Awaiting testing results.     Pal care will continue to follow and meet with pt 's wife and family when they visit.     MPOA: Pt's wife Irma Burnett is NOK.   Code status: Partial.       Symptom management:   SAH (subarachnoid hemorrhage)- managed per primary team  Diffuse SAH noted on CTH after concern for seizure  No know trauma, though INR was supra-theraputic.   CTA head read as no concern for Aneurysm/avm  MRI brain without, MRA brain without pending    Family awaiting further testing.      Seizure managed per primary team  Arrived on keppra, unable to view/assess home meds or meds  prior to arrival  Cont. Versed gtt, byt wean from 5 to 3mg/hr  Keppra 1gm bid IV  EEG in process     Pulmonary  Acute respiratory failure with hypoxia and hypercapnia- managed per primary team  Arrived on vent, PAo2 60% with peep 5  Bilateral effusion on CT chest    Pt still on vent.       Pt does not appear to be in distress.     Plan:   Per Neuro MD, they will be having family meeting when family can visit again.   Will reach out to pt's wife for continued support.   Pal care will meet with family when they visit.

## 2022-11-22 NOTE — SIGNIFICANT EVENT
Called to bedside by patient's MARICHUY, patient pulseless. On my assessment at bedside, for 2 minutes, patient has no pulse or heart beat, no spontaneous breathing, pupils not reactions, no spontaneous movements or response to pain. Patient pronounced dead. Family informed over the phone by Aura Edgar.    Time of Death:  11/22/2022, at 3:30 AM  Cause of Death: Cardiopulmonary arrest; Subarachnoid hemorrhage   LEEMARILYN email: [email or web address]     Jerome Izaguirre MD  Neurocritical Care Fellow

## 2022-11-22 NOTE — DISCHARGE SUMMARY
Death Note  Neurocritical Care      Admit Date: 2022    Date of Death: 2022    Attending Physician: Klaus Ferraro DO    Principal Diagnoses: SAH (subarachnoid hemorrhage)    Preliminary Cause of Death: SAH (subarachnoid hemorrhage)    Other Secondary Diagnoses:   Patient Active Problem List   Diagnosis    Combined systolic and diastolic congestive heart failure    Seizure    Hypotension    ROSEMARIE (acute kidney injury)    MSSA bacteremia    SAH (subarachnoid hemorrhage)    Acute respiratory failure with hypoxia and hypercapnia    Metabolic alkalosis    [place] coma scale total score 3-8    Atrial fibrillation    Elevated troponin    Congestive heart failure    Subarachnoid bleed    Palliative care encounter    Advance care planning    Fever in adult    Comfort measures only status       Discharged Condition:     HPI & Hospital Course:   68 yo M w/ hx a fib/a flutter (on Warfarin), CHF transferred for eval and management for SAH. No notes sent with patient, unable to get exact events/timeline. It appears he was being treated for CHF, had unsuccessful cardioversion on  and also staph bacteremia on . Over the weekend, had seizure acitivy over the weekend requiring intuabtion. CTH showed diffuse SAH, CTA read as no Aneurysm/avm. INR was supratheraputic a few days ago > 4, but had decreased to 2's last few days. No reports of trauma, possible spontaneuous in setting of supratheraputic INR. PCC and Vit K given for elevated INR. DDAVP given for ASA. Transferred to [facility] for further eval and management.      Hospital Course:   11/15/2022: weaning versed to off, continue formal EEG and Keppra. Echo done, EF 7%. Patient made DNR by family. Added diamox 500. Consulted ID for staph bacteremia, palliative.   2022: OSH hospital records received via print. Per OSH records, patient inbubated on , central line placed . Patient noted to have seizure-like activity at OSH although no EEG  records were obtained. New TTE obtained here w/ EF 10-15%; lowered MAP goal to >60. A/C to SIMV; ABG with improving alkalosis; continuing diamox. Continuing abx for bacteremia; BC NGTD.   11/17/2022: Febrile t/o today with increased WBC; abx switch to Oxacillin per ID. CXR with improving pulmonary edema. ABGs consistent with improving alkalosis. Vent SIMV. Duonebs and CPT for mucus production. Cr increasing; will CTM. Keppra 500 BID.   11/18/2022: Persistent fever although WBC downtrending. Started Levo this PM for MAP <60. Lactic and procal pending. EEG without seizure activity. Increased insulin regimen for hyperglycemia.   11/19/2022: 250cc FW boluses and 1x Lasix 80 IV for hypernatremia. Continuing with electrolyte replacement. Patient with diaphragmatic spasms, abdominal distension and slight constipation - f/u CXR and KUB. Will continue GOC discussion with family today.   11/20/2022: Events of twitching concerning for seizure overnight, anti-seizure meds increased and cap EEG without further events, none this morning.  Also had brief episode of mucous plugging requiring lavage. Febrile again overnight, continuing antibiotics as ordered, not obtaining SIVA unless family decides to pursue full care given high risk of procedure in patient's highly unstable state. Hypernatremia remains significant, .45 NS x6 hours administered, will likely need re-diuresis to maintain euvolemia with heart failure.  Intermittent Afib RVR with decreased BP, restarted on amiodarone infusion as gut not appearing to absord many medications, found in residuals by RN on OG tube.  Awaiting family decision making regarding goals of care.  11/21/2022: More events concerning for seizures overnight with facial twitching, remains essentially unresponsive off sedation.  Still febrile, intermittently hypotensive improved from yesterday on amiodarone gtt.  Still high residuals from NG, currently TF held, bowel regimen continued. Still on  oxacillin, holding SIVA unless family decides to pursue full care.    Dr. Ferraro engaged family (Irma and Marino) in goals of care conversation with decision to transition to comfort based care this evening. Mr. Burnett was palliatively extubated in the evening and given morphine for any signs of discomfort.     : Patient  at 0330. Family called and informed with time of death.         Aura Edgar PA-C  Neuro Critical Care

## 2022-11-22 NOTE — ASSESSMENT & PLAN NOTE
Unsure of cause on admissed, it was mentioned when calling for transfer that patient may be bacteremic/sepsie. Aggressive diuresis over past few weeks/days, hypovolemia. AEDs versed gtt, and possible propofol prior to transfer. Required Levo for SBP > 100 and MAP > 65    -goal MAP>60 in setting of HFrEF EF 10-15%  -levo gtt    11/20  Continuing intermittent hypotension in setting of RVR, amiodarone gtt started with improvement  11/21 BP slightly better, remains low

## 2022-11-22 NOTE — PROCEDURES
EEG REPORT      Enrico Burnett  [id number]  1955    DATE OF SERVICE: 11/21/2022         METHODOLOGY      Extended electroencephalographic recording is made while the patient is ambulatory and continuing normal daily activities.  Electrodes are placed according to the International 10-20 placement system and included T1 and T2 electrode placement.  Twenty four (24) channels of digital signal (sampling rate of 512/sec) was simultaneously recorded from the scalp including EKG and eye monitors.  Recording band pass was 0.1 to 100 hz and all data was stored digitally on the recorder.  The patient is instructed to press an event button when clinical symptoms occur and write the symptoms into a diary. Activation procedures which include photic stimulation, hyperventilation and instructing patients to perform simple task are done in selected patients.        The EEG is displayed on a monitor screen and can be reformatted into different montages for evaluation.  The entire recoding is submitted for computer assisted analysis to detect spike and electrographic seizure activity.  The entire recording is visually reviewed and the times identified by computer analysis as being spikes or seizures are reviewed again.  Compresses spectral analysis (CSA) is also performed on the activity recorded from each individual channel.  This is displayed as a power display of frequencies from 0 to 30 Hz over time.   The CSA analysis is done and displayed continuously.  This is reviewed for asymmetries in power between homologous areas of the scalp and for presence of changes in power which canbe seen when seizures occur.  Sections of suspected abnormalities on the CSA is then compared with the original EEG recording.  .     [employer] software was also utilized in the review of this study.  This software suite analyzes the EEG recording in multiple domains.  Coherence and rhythmicity is computed to identify EEG sections which may  contain organized seizures.  Each channel undergoes analysis to detect presence of spike and sharp waves which have special and morphological characteristic of epileptic activity.  The routine EEG recording is converted from spacial into frequency domain.  This is then displayed comparing homologous areas to identify areas of significant asymmetry.  Algorithm to identify non-cortically generated artifact is used to separate eye movement, EMG and other artifact from the EEG     Recording Times    From 10:00 to 21:00    A total of 11 hours of EEG was recorded.      EEG FINDINGS:  Background activity:   The background rhythm was characterized by polymorphic delta with some over riding theta and alpha  Symmetry and continuity: the background was continuous and symmetric     Sleep:   No sleep transients or state change were seen.    Activation procedures:   Preserved reactivity    Abnormal activity:   No epileptiform discharges, periodic discharges, lateralized rhythmic delta activity or electrographic seizures were seen.    IMPRESSION:   Abnormal EEG due to a moderate generalized non-specific cerebral dysfunction with no electrographic seizures or indications of seizure tendency.      Navin Driver MD  Neurology-Epilepsy.  [facility].

## 2022-11-22 NOTE — ACP (ADVANCE CARE PLANNING)
Advance Care Planning     Called patient's family today via phone, spoke directly with son Marino and wife Irma, as well as indirectly with other close family members on speaker phone. Discussed that Mr Burnett unfortunately was continuing to experience significant medical difficulties, including return of Afib RVR requiring initiation of Amiodarone infusion, ongoing potential partial seizures despite escalation of medical therapies, and fevers despite Oxacillin.  Answered questions regarding his consciousness and explained that unfortunately he did not appear to me any more aware of surroundings than several days prior.      Family did express to me that he would certainly not accept placement long-term in a nursing care facility or long-term hospital, and would not accept a tracheostomy.  They also stated to me that Mr Burnett had directly said to them in past that he did not want any of them to have to witness his death, Marino relayed to me that he felt that his father was sparing his loved ones in this way.  I explained that many people in these situations feel similarly, and that it would be entirely appropriate for family to remember Kyler as he was, and that we the medical team could take care of him at the end of his life in a way that respected his comfort and dignity.  Family did ask that we no longer prolong this process, and agreed that patient's care should be moved to an approach prioritizing comfort, rather than extension of life.  I answered all questions regarding how we would proceed with ensuring comfort and performing extubation to allow Mr Burnett to die naturally.     At this point will make patient full DNR, have AICD turned off, and begin process of initiating comfort measures only. At time of patient's passing, will contact family with time of death and assist them in post-mortem care decisions.    I spent 30 minutes discussing care and coordinating decisions with patient's family, via  phone.    Klaus Ferraro, DO  Neuro Critical Care  11/21/2022

## 2022-11-22 NOTE — ASSESSMENT & PLAN NOTE
Blood Culture NGTD  Febrile t/o today; WBC persistently high (stable)  Zosyn, Vanc switched to Oxacillin per ID     11/20 ID recommending SIVA but not stable for this, or at least not worth possible risk unless family determines full care is within scope of goals  11/21: Still febrile, pending family decision regarding goals prior to SIVA

## 2022-11-22 NOTE — ASSESSMENT & PLAN NOTE
Arrived on keppra, unable to view/assess home meds or meds prior to arrival. OSH documented seizure like activity but no EEG records obtained.     Off fentanyl and versed with continued difficulty arousing   Keppra 500 bid IV  Formal EEG without seizure activity     11/20: another event overnight, loaded with extra keppra with resolution, CAP EEG negative  11/21: Still having same events, Keppra increased no exam change, EEG running

## 2022-11-22 NOTE — ASSESSMENT & PLAN NOTE
Unable to view any documentation from OSH, it was mentioned that EF was 20-25%, and diastolic function was also decreased. Repeat TTE at [facility] with EF 10-15%.     Now requiring levo gtt for MAP>60 wean as able  EKG w/ atrial flutter and AV block; follow-up EKG with AF (without RVR)  -It did appear patient may have been on Entresto at some point, so possible acute on chronic failure    -improving pulmonary edema on CXR 11/17  -Increasing FW boluses and 1x dose Lasix 80 for hypernatremia  - 11/20 intermittent doses of Lasix when necessary avoid scheduling. Titrate pressors as needed  - 11/21 intermittent use of norepinephrine

## 2022-11-22 NOTE — PROGRESS NOTES
[facility] - Neuro Critical Care  Neurocritical Care  Progress Note    Admit Date: 11/14/2022  Service Date: 11/21/2022  Length of Stay: 7    Subjective:     Chief Complaint: SAH (subarachnoid hemorrhage)    History of Present Illness: 66 yo M w/ hx a fib/a flutter (on Warfarin), CHF transferred for eval and management for SAH. No notes sent with patient, unable to get exact events/timeline. It appears he was being treated for CHF, had unsuccessful cardioversion on 11/8 and also staph bacteremia on 11/11. Over the weekend, had seizure acitivy over the weekend requiring intuabtion. CTH showed diffuse SAH, CTA read as no Aneurysm/avm. INR was supratheraputic a few days ago > 4, but had decreased to 2's last few days. No reports of trauma, possible spontaneuous in setting of supratheraputic INR. PCC and Vit K given for elevated INR. DDAVP given for ASA. Transferred to [facility] for further eval and management.         Hospital Course: 11/15/2022: weaning versed to off, continue formal EEG and Keppra. Echo done, EF 7%. Patient made DNR by family. Added diamox 500. Consulted ID for staph bacteremia, palliative.   11/16/2022: OSH hospital records received via print. Per OSH records, patient inbubated on 11/11, central line placed 11/14. Patient noted to have seizure-like activity at OSH although no EEG records were obtained. New TTE obtained here w/ EF 10-15%; lowered MAP goal to >60. A/C to SIMV; ABG with improving alkalosis; continuing diamox. Continuing abx for bacteremia; BC NGTD.   11/17/2022: Febrile t/o today with increased WBC; abx switch to Oxacillin per ID. CXR with improving pulmonary edema. ABGs consistent with improving alkalosis. Vent SIMV. Duonebs and CPT for mucus production. Cr increasing; will CTM. Keppra 500 BID.   11/18/2022: Persistent fever although WBC downtrending. Started Levo this PM for MAP <60. Lactic and procal pending. EEG without seizure activity. Increased insulin regimen for hyperglycemia.    11/19/2022: 250cc FW boluses and 1x Lasix 80 IV for hypernatremia. Continuing with electrolyte replacement. Patient with diaphragmatic spasms, abdominal distension and slight constipation - f/u CXR and KUB. Will continue GOC discussion with family today.   11/20/2022: Events of twitching concerning for seizure overnight, anti-seizure meds increased and cap EEG without further events, none this morning.  Also had brief episode of mucous plugging requiring lavage. Febrile again overnight, continuing antibiotics as ordered, not obtaining SIVA unless family decides to pursue full care given high risk of procedure in patient's highly unstable state. Hypernatremia remains significant, .45 NS x6 hours administered, will likely need re-diuresis to maintain euvolemia with heart failure.  Intermittent Afib RVR with decreased BP, restarted on amiodarone infusion as gut not appearing to absord many medications, found in residuals by RN on OG tube.  Awaiting family decision making regarding goals of care.  11/21/2022: More events concerning for seizures overnight with facial twitching, remains essentially unresponsive off sedation.  Still febrile, intermittently hypotensive improved from yesterday on amiodarone gtt.  Still high residuals from NG, currently TF held, bowel regimen continued. Still on oxacillin, holding SIVA unless family decides to pursue full care.        Objective:     Vitals:  Temp: (!) 101.3 °F (38.5 °C)  Pulse: (!) 131  Rhythm: paced rhythm, atrial rhythm  BP: 95/65  MAP (mmHg): 74  Resp: (!) 29  SpO2: 97 %  Oxygen Concentration (%): 40  O2 Device (Oxygen Therapy): ventilator  Vent Mode: A/C  Set Rate: 20 BPM  Vt Set: 460 mL  PEEP/CPAP: 8 cmH20  Peak Airway Pressure: 32 cmH2O  Mean Airway Pressure: 16 cmH20  Plateau Pressure: 0 cmH20    Temp  Min: 97.9 °F (36.6 °C)  Max: 101.3 °F (38.5 °C)  Pulse  Min: 98  Max: 132  BP  Min: 82/56  Max: 146/67  MAP (mmHg)  Min: 63  Max: 107  Resp  Min: 20  Max: 31  SpO2  Min:  91 %  Max: 99 %  Oxygen Concentration (%)  Min: 40  Max: 40    11/20 0701 - 11/21 0700  In: 4679.6 [I.V.:1001.9]  Out: 3182 [Urine:3007]   Unmeasured Output  Urine Occurrence: 1  Stool Occurrence: 1  Pad Count: 2       Physical Exam  Constitutional:       Appearance: He is obese. He is ill-appearing.      Comments: Intubated; opening eyes to verbal stimulation, no tracking and not following any commands. Not on sedation   HENT:      Head: Normocephalic and atraumatic.      Right Ear: External ear normal.      Ears:      Comments: Necrotic wound present on L ear      Nose: Nose normal.      Mouth/Throat:      Mouth: Mucous membranes are moist.      Comments: ETT in place  Eyes:      Conjunctiva/sclera: Conjunctivae normal.   Cardiovascular:      Rate and Rhythm: Tachycardia. Rhythm irregular.      Heart sounds: Normal heart sounds.   Pulmonary:      Breath sounds: Normal breath sounds.      Comments: Ventilated breath sounds equal bilaterally   Abdominal:      General: Abdomen is flat. There is no distension.      Palpations: Abdomen is soft.   Musculoskeletal:      Cervical back: Neck supple.      Right lower leg: No edema.      Left lower leg: No edema.      Comments: Multiple wounds over bilateral LE; dressings CDI. WC following    Skin:     General: Skin is warm and dry. Wounds, skin tears and multiple keratosis throughout bilateral UE and LE.      Capillary Refill: Capillary refill takes less than 2 seconds.   Neurological:      Comments: Eyes open with tactile and verbal stimulation. Although remains difficult to arouse. Withdrawal to painful stimulus on LUE; involuntary twitching movements but no withdrawal on the RUE.  Weak withdrawal to painful stimulus on LE.     Medications:  Continuousmorphine    Scheduledlevetiracetam IV, 1,000 mg, Q12H  scopolamine, 1 patch, Q3 Days    PRNondansetron, 8 mg, Q8H PRN      Today I personally reviewed pertinent medications, lines/drains/airways, imaging, cardiology results,  laboratory results, microbiology results,    Diet  Diet NPO  Diet NPO    TF held today      Assessment/Plan:     Neuro  * SAH (subarachnoid hemorrhage)  Diffuse SAH noted on CTH after concern for seizures over the weekend. No known trauma, though INR was supra-theraputic.  PCC/vit K(coumadin) and DDAVP (ASA) given prior to transport    Trending daily coags (PT/INR, aPTT)  CTA head read as no concern for Aneurysm/avm   MRI brain without, MRA brain without showing consistent stable bleed as prior CTH  NSGY s/o at this time; not a surgical candidate d/t neurologic status and instability  vas neuro following     Subarachnoid bleed  See SAH    [place] coma scale total score 3-8  E1 V1 M3  Off sedation with difficulty arousing, although opens eyes with verbal stimulation.  No command following. Continue AEDS.      Seizure  Arrived on keppra, unable to view/assess home meds or meds prior to arrival. OSH documented seizure like activity but no EEG records obtained.     Off fentanyl and versed with continued difficulty arousing   Keppra 500 bid IV  Formal EEG without seizure activity     11/20: another event overnight, loaded with extra keppra with resolution, CAP EEG negative  11/21: Still having same events, Keppra increased no exam change, EEG running    Pulmonary  Acute respiratory failure with hypoxia and hypercapnia  Arrived on vent, PAo2 60% with peep 5. Bilateral effusion on CT chest  Assess and wean fiO2 as able  Alkalosis improving on ABGs  Vent SIMV; following ABGs as indicated         Cardiac/Vascular  Congestive heart failure  Patient is identified as having Systolic (HFrEF) heart failure that is Chronic. CHF is currently uncontrolled due to Continued edema of extremities, Dyspnea not returned to baseline after multiple doses of IV diuretic and Pulmonary edema/pleural effusion on CXR. Latest ECHO performed and demonstrates- Results for orders placed during the hospital encounter of  11/14/22    Echo    Interpretation Summary  · The left ventricle is normal in size with severely decreased systolic function.  · The estimated ejection fraction is 10-15%.  · There is left ventricular global hypokinesis. No thrombus seen.  · Left ventricular diastolic dysfunction.  · Normal right ventricular size with mildly to moderately reduced right ventricular systolic function.  · Severe left atrial enlargement.  · Mechanically ventilated; cannot use inferior caval vein diameter to estimate central venous pressure.  . Continue acetazolamide (restart GDMT when stable) and monitor clinical status closely. Monitor on telemetry. Patient is off CHF pathway.  Monitor strict Is&Os and daily weights.  Place on fluid restriction of tube feedings only. Continue to stress to patient importance of self efficacy and  on diet for CHF. Last BNP reviewed- and noted below   Recent Labs   Lab 11/15/22  0016   *   .      Elevated troponin  Elevated prior to arrival    Atrial fibrillation  Rate stable t/o hospital course; episode of tachycardia 11/18 s/p amio 150mg bolus IV  Began SQH DVT ppx     11/20 intermittent spells of RVR which drop pressure substantially, restarted on amio gtt    Hypotension  Unsure of cause on admissed, it was mentioned when calling for transfer that patient may be bacteremic/sepsie. Aggressive diuresis over past few weeks/days, hypovolemia. AEDs versed gtt, and possible propofol prior to transfer. Required Levo for SBP > 100 and MAP > 65    -goal MAP>60 in setting of HFrEF EF 10-15%  -levo gtt    11/20  Continuing intermittent hypotension in setting of RVR, amiodarone gtt started with improvement  11/21 BP slightly better, remains low        Combined systolic and diastolic congestive heart failure  Unable to view any documentation from OSH, it was mentioned that EF was 20-25%, and diastolic function was also decreased. [place] [facility] [place]     Now requiring levo gtt for MAP>60  wean as able  EKG w/ atrial flutter and AV block; follow-up EKG with AF (without RVR)  -It did appear patient may have been on Entresto at some point, so possible acute on chronic failure    -improving pulmonary edema on CXR 11/17  -Increasing FW boluses and 1x dose Lasix 80 for hypernatremia  - 11/20 intermittent doses of Lasix when necessary avoid scheduling. Titrate pressors as needed  - 11/21 intermittent use of norepinephrine      Renal/  Metabolic alkalosis  Bicarb > 40, concern for contraction alkalosis in setting of aggressive diuressis; improved with diamox  Continuing to follow CMP and ABGs as indicated; improving on ABG  Lasix 1x IV 80 11/19  Alkalosis improved on subsequent ABGs      ROSEMARIE (acute kidney injury)  ROSEMARIE vs ROSEMARIE on CKD  follow I/O, crt/bun  Avoid nephrotoxins, diuresis as appropriate    ID  MSSA bacteremia  Blood Culture NGTD  Febrile t/o today; WBC persistently high (stable)  Zosyn, Vanc switched to Oxacillin per ID     11/20 ID recommending SIVA but not stable for this, or at least not worth possible risk unless family determines full care is within scope of goals  11/21: Still febrile, pending family decision regarding goals prior to SIVA    Palliative Care  Advance care planning  See palliative care encounter     Palliative care encounter  Palliative following.     -family visit 11/18, see ACP notes. Pending family decision making           The patient is being Prophylaxed for:  Venous Thromboembolism with: Mechanical or Chemical  Stress Ulcer with: PPI  Ventilator Pneumonia with: chlorhexidine oral care    Activity Orders          Turn patient starting at 11/15 0000    Elevate HOB starting at 11/14 2326    Diet NPO: NPO starting at 11/14 2326        Partial Code     Disposition: Pending further goals of care discussion with family.  Has been made DNR with no significant care escalation, rest pending.    Critical condition in that Patient has a condition that poses threat to life and bodily  function: see associated documentation     40 minutes of Critical care time was spent personally by me on the following activities: development of treatment plan with patient or surrogate and bedside caregivers, discussions with consultants, evaluation of patient's response to treatment, examination of patient, ordering and performing treatments and interventions, ordering and review of laboratory studies, ordering and review of radiographic studies, pulse oximetry, antibiotic titration if applicable, vasopressor titration if applicable, re-evaluation of patient's condition. This critical care time did not overlap with that of any other provider or involve time for any procedures. There is high probability for acute neurological change leading to clinical and possibly life-threatening deterioration requiring highest level of physician preparedness for urgent intervention.      Klaus Ferraro, DO  Neurocritical Care  [facility] - Neuro Critical Care

## 2022-11-22 NOTE — SUBJECTIVE & OBJECTIVE
Objective:     Vitals:  Temp: (!) 101.3 °F (38.5 °C)  Pulse: (!) 131  Rhythm: paced rhythm, atrial rhythm  BP: 95/65  MAP (mmHg): 74  Resp: (!) 29  SpO2: 97 %  Oxygen Concentration (%): 40  O2 Device (Oxygen Therapy): ventilator  Vent Mode: A/C  Set Rate: 20 BPM  Vt Set: 460 mL  PEEP/CPAP: 8 cmH20  Peak Airway Pressure: 32 cmH2O  Mean Airway Pressure: 16 cmH20  Plateau Pressure: 0 cmH20    Temp  Min: 97.9 °F (36.6 °C)  Max: 101.3 °F (38.5 °C)  Pulse  Min: 98  Max: 132  BP  Min: 82/56  Max: 146/67  MAP (mmHg)  Min: 63  Max: 107  Resp  Min: 20  Max: 31  SpO2  Min: 91 %  Max: 99 %  Oxygen Concentration (%)  Min: 40  Max: 40    11/20 0701 - 11/21 0700  In: 4679.6 [I.V.:1001.9]  Out: 3182 [Urine:3007]   Unmeasured Output  Urine Occurrence: 1  Stool Occurrence: 1  Pad Count: 2       Physical Exam  Constitutional:       Appearance: He is obese. He is ill-appearing.      Comments: Intubated; opening eyes to verbal stimulation, no tracking and not following any commands. Not on sedation   HENT:      Head: Normocephalic and atraumatic.      Right Ear: External ear normal.      Ears:      Comments: Necrotic wound present on L ear      Nose: Nose normal.      Mouth/Throat:      Mouth: Mucous membranes are moist.      Comments: ETT in place  Eyes:      Conjunctiva/sclera: Conjunctivae normal.   Cardiovascular:      Rate and Rhythm: Tachycardia. Rhythm irregular.      Heart sounds: Normal heart sounds.   Pulmonary:      Breath sounds: Normal breath sounds.      Comments: Ventilated breath sounds equal bilaterally   Abdominal:      General: Abdomen is flat. There is no distension.      Palpations: Abdomen is soft.   Musculoskeletal:      Cervical back: Neck supple.      Right lower leg: No edema.      Left lower leg: No edema.      Comments: Multiple wounds over bilateral LE; dressings CDI. WC following    Skin:     General: Skin is warm and dry. Wounds, skin tears and multiple keratosis throughout bilateral UE and LE.       Capillary Refill: Capillary refill takes less than 2 seconds.   Neurological:      Comments: Eyes open with tactile and verbal stimulation. Although remains difficult to arouse. Withdrawal to painful stimulus on LUE; involuntary twitching movements but no withdrawal on the RUE.  Weak withdrawal to painful stimulus on LE.     Medications:  Continuousmorphine    Scheduledlevetiracetam IV, 1,000 mg, Q12H  scopolamine, 1 patch, Q3 Days    PRNondansetron, 8 mg, Q8H PRN      Today I personally reviewed pertinent medications, lines/drains/airways, imaging, cardiology results, laboratory results, microbiology results,    Diet  Diet NPO  Diet NPO    TF held today

## 2022-11-23 LAB
BACTERIA BLD CULT: NORMAL
BACTERIA BLD CULT: NORMAL

## 2023-01-15 PROBLEM — G93.40 ACUTE ENCEPHALOPATHY: Status: ACTIVE | Noted: 2023-01-15

## 2023-01-15 PROBLEM — N18.9 ACUTE KIDNEY INJURY SUPERIMPOSED ON CHRONIC KIDNEY DISEASE: Status: ACTIVE | Noted: 2022-01-01

## 2023-01-15 PROBLEM — E87.6 HYPOKALEMIA: Status: ACTIVE | Noted: 2023-01-15

## 2023-01-15 PROBLEM — Z99.11 ON MECHANICALLY ASSISTED VENTILATION: Status: ACTIVE | Noted: 2023-01-15

## 2023-01-15 PROBLEM — R77.0 LOW SERUM ALBUMIN: Status: ACTIVE | Noted: 2023-01-15

## 2023-01-15 PROBLEM — R57.9 SHOCK: Status: ACTIVE | Noted: 2023-01-15

## 2023-01-15 PROBLEM — E87.0 HYPERNATREMIA: Status: ACTIVE | Noted: 2023-01-15

## 2023-01-15 PROBLEM — D68.9: Status: ACTIVE | Noted: 2022-01-01

## 2023-04-25 NOTE — PLAN OF CARE
[facility] Care Plan    POC reviewed with Enrico INDERJIT Ceeis and family at 1400. Pt verbalized understanding. Questions and concerns addressed. No acute events today. Pt progressing toward goals. Will continue to monitor. See below and flowsheets for full assessment and VS info.     SBP labile again today. Restarted Levo. Family to meet with Team today and discuss plan of care. Withdrawal noted in BUE today as well as BLE.    Stroke book at bedside.          Is this a stroke patient? yes- Stroke booklet reviewed with patient and family, risk factors identified for patient and stroke booklet remains at bedside for ongoing education.     Neuro:  [place] Coma Scale  Best Eye Response: 1-->(E1) none  Best Motor Response: 4-->(M4) withdraws from pain  Best Verbal Response: 1-->(V1) none  [place] Coma Scale Score: 6  Assessment Qualifiers: patient intubated  Pupil PERRLA: yes     24 hr Temp:  [99.1 °F (37.3 °C)-102.4 °F (39.1 °C)]     CV:   Rhythm: atrial rhythm  BP goals:   SBP < 160  MAP > 65    Resp:   O2 Device (Oxygen Therapy): ventilator  Vent Mode: SIMV  Set Rate: 20 BPM  Oxygen Concentration (%): 40  Vt Set: 460 mL  PEEP/CPAP: 8 cmH20  Pressure Support: 10 cmH20    Plan: N/A    GI/:     Diet/Nutrition Received: tube feeding  Last Bowel Movement: 11/19/22  Voiding Characteristics: external catheter, incontinence    Intake/Output Summary (Last 24 hours) at 11/19/2022 1409  Last data filed at 11/19/2022 0901  Gross per 24 hour   Intake 1155 ml   Output 1875 ml   Net -720 ml     Unmeasured Output  Urine Occurrence: 1  Stool Occurrence: 1  Pad Count: 2    Labs/Accuchecks:  Recent Labs   Lab 11/19/22  0630   WBC 15.05*   RBC 4.37*   HGB 13.3*   HCT 46.5         Recent Labs   Lab 11/19/22  0630 11/19/22  1052   * 159*   K 3.8 3.4*   CO2 31* 32*    113*   * 99*   CREATININE 1.9* 1.8*   ALKPHOS 132  --    ALT 68*  --    *  --    BILITOT 1.7*  --       Recent Labs   Lab 11/19/22  0630   INR 1.3*    APTT 31.2      Recent Labs   Lab 11/15/22  0255   *   CPKMB 1.5   TROPONINI 0.205*   MB 0.2       Electrolytes: Electrolytes replaced  Accuchecks: Q4H    Gtts:   dextrose 10 % in water (D10W)      NORepinephrine bitartrate-D5W 0.02 mcg/kg/min (11/19/22 1105)       LDA/Wounds:  Lines/Drains/Airways       Drain  Duration                  NG/OG Tube Center mouth -- days    Male External Urinary Catheter 11/15/22 1100 Small 4 days         Rectal Tube 11/17/22 0445 rectal tube w/ balloon (indicate number of mLs) 2 days              Airway  Duration                  Airway - Non-Surgical -- days              Peripheral Intravenous Line  Duration                  Peripheral IV - Single Lumen 11/17/22 1549 20 G Anterior;Right Forearm 1 day         Peripheral IV - Single Lumen 11/17/22 1550 20 G Anterior;Left Forearm 1 day         Peripheral IV - Single Lumen 11/17/22 1553 18 G Anterior;Right Upper Arm 1 day         Peripheral IV - Single Lumen 11/17/22 1556 20 G Anterior;Left Antecubital 1 day                  Wounds: Yes  Wound care consulted: Yes    Libtayo Pregnancy And Lactation Text: This medication is contraindicated in pregnancy and when breast feeding.